# Patient Record
Sex: FEMALE | Race: WHITE | Employment: OTHER | URBAN - METROPOLITAN AREA
[De-identification: names, ages, dates, MRNs, and addresses within clinical notes are randomized per-mention and may not be internally consistent; named-entity substitution may affect disease eponyms.]

---

## 2017-01-14 DIAGNOSIS — I10 HYPERTENSION: ICD-10-CM

## 2017-01-16 RX ORDER — HYDROCHLOROTHIAZIDE 12.5 MG/1
CAPSULE, GELATIN COATED ORAL
Qty: 90 CAPSULE | Refills: 1 | Status: SHIPPED | OUTPATIENT
Start: 2017-01-16 | End: 2017-03-08 | Stop reason: SDUPTHER

## 2017-03-08 ENCOUNTER — OFFICE VISIT (OUTPATIENT)
Dept: FAMILY MEDICINE CLINIC | Age: 57
End: 2017-03-08

## 2017-03-08 VITALS
WEIGHT: 265 LBS | DIASTOLIC BLOOD PRESSURE: 82 MMHG | RESPIRATION RATE: 14 BRPM | TEMPERATURE: 97.8 F | HEART RATE: 74 BPM | SYSTOLIC BLOOD PRESSURE: 114 MMHG | BODY MASS INDEX: 42.59 KG/M2 | HEIGHT: 66 IN

## 2017-03-08 DIAGNOSIS — I10 ESSENTIAL HYPERTENSION: ICD-10-CM

## 2017-03-08 DIAGNOSIS — Z12.31 ENCOUNTER FOR SCREENING MAMMOGRAM FOR BREAST CANCER: ICD-10-CM

## 2017-03-08 DIAGNOSIS — R73.03 PREDIABETES: ICD-10-CM

## 2017-03-08 DIAGNOSIS — Z12.4 CERVICAL CANCER SCREENING: Primary | ICD-10-CM

## 2017-03-08 DIAGNOSIS — E55.9 VITAMIN D DEFICIENCY: ICD-10-CM

## 2017-03-08 DIAGNOSIS — K76.0 FATTY LIVER: ICD-10-CM

## 2017-03-08 DIAGNOSIS — E78.2 MIXED HYPERLIPIDEMIA: ICD-10-CM

## 2017-03-08 DIAGNOSIS — E03.8 OTHER SPECIFIED HYPOTHYROIDISM: ICD-10-CM

## 2017-03-08 DIAGNOSIS — Z12.4 CERVICAL CANCER SCREENING: ICD-10-CM

## 2017-03-08 LAB
ALBUMIN SERPL-MCNC: 4.3 G/DL (ref 3.9–4.9)
ALP BLD-CCNC: 122 U/L (ref 40–130)
ALT SERPL-CCNC: 33 U/L (ref 0–33)
ANION GAP SERPL CALCULATED.3IONS-SCNC: 14 MEQ/L (ref 7–13)
AST SERPL-CCNC: 28 U/L (ref 0–35)
BILIRUB SERPL-MCNC: 0.5 MG/DL (ref 0–1.2)
BUN BLDV-MCNC: 13 MG/DL (ref 6–20)
CALCIUM SERPL-MCNC: 9.9 MG/DL (ref 8.6–10.2)
CHLORIDE BLD-SCNC: 101 MEQ/L (ref 98–107)
CHOLESTEROL, TOTAL: 176 MG/DL (ref 0–199)
CO2: 24 MEQ/L (ref 22–29)
CREAT SERPL-MCNC: 0.53 MG/DL (ref 0.5–0.9)
GFR AFRICAN AMERICAN: >60
GFR NON-AFRICAN AMERICAN: >60
GLOBULIN: 2.5 G/DL (ref 2.3–3.5)
GLUCOSE BLD-MCNC: 100 MG/DL (ref 74–109)
HBA1C MFR BLD: 5.9 % (ref 4.8–5.9)
HCT VFR BLD CALC: 40.6 % (ref 37–47)
HDLC SERPL-MCNC: 51 MG/DL (ref 40–59)
HEMOGLOBIN: 13.4 G/DL (ref 12–16)
LDL CHOLESTEROL CALCULATED: 73 MG/DL (ref 0–129)
MCH RBC QN AUTO: 27.2 PG (ref 27–31.3)
MCHC RBC AUTO-ENTMCNC: 32.9 % (ref 33–37)
MCV RBC AUTO: 82.8 FL (ref 82–100)
PDW BLD-RTO: 14.4 % (ref 11.5–14.5)
PLATELET # BLD: 288 K/UL (ref 130–400)
POTASSIUM SERPL-SCNC: 4.4 MEQ/L (ref 3.5–5.1)
RBC # BLD: 4.91 M/UL (ref 4.2–5.4)
SODIUM BLD-SCNC: 139 MEQ/L (ref 132–144)
T4 FREE: 1.15 NG/DL (ref 0.93–1.7)
TOTAL PROTEIN: 6.8 G/DL (ref 6.4–8.1)
TRIGL SERPL-MCNC: 258 MG/DL (ref 0–200)
TSH SERPL DL<=0.05 MIU/L-ACNC: 2.77 UIU/ML (ref 0.27–4.2)
VITAMIN D 25-HYDROXY: 31.7 NG/ML (ref 30–100)
WBC # BLD: 6.4 K/UL (ref 4.8–10.8)

## 2017-03-08 PROCEDURE — 99214 OFFICE O/P EST MOD 30 MIN: CPT | Performed by: FAMILY MEDICINE

## 2017-03-08 RX ORDER — ATORVASTATIN CALCIUM 20 MG/1
20 TABLET, FILM COATED ORAL DAILY
Qty: 90 TABLET | Refills: 1 | Status: SHIPPED | OUTPATIENT
Start: 2017-03-08 | End: 2017-10-20 | Stop reason: SDUPTHER

## 2017-03-08 RX ORDER — HYDROCHLOROTHIAZIDE 12.5 MG/1
CAPSULE, GELATIN COATED ORAL
Qty: 90 CAPSULE | Refills: 1 | Status: SHIPPED | OUTPATIENT
Start: 2017-03-08 | End: 2017-10-20 | Stop reason: SDUPTHER

## 2017-03-08 ASSESSMENT — PATIENT HEALTH QUESTIONNAIRE - PHQ9
SUM OF ALL RESPONSES TO PHQ9 QUESTIONS 1 & 2: 0
2. FEELING DOWN, DEPRESSED OR HOPELESS: 0
SUM OF ALL RESPONSES TO PHQ QUESTIONS 1-9: 0
1. LITTLE INTEREST OR PLEASURE IN DOING THINGS: 0

## 2017-05-08 ENCOUNTER — HOSPITAL ENCOUNTER (OUTPATIENT)
Dept: WOMENS IMAGING | Age: 57
Discharge: HOME | End: 2017-05-08
Payer: COMMERCIAL

## 2017-05-08 DIAGNOSIS — Z13.9 SCREENING: ICD-10-CM

## 2017-06-09 ENCOUNTER — HOSPITAL ENCOUNTER (OUTPATIENT)
Dept: WOMENS IMAGING | Age: 57
Discharge: HOME | End: 2017-06-09
Payer: COMMERCIAL

## 2017-06-09 PROCEDURE — G0202 SCR MAMMO BI INCL CAD: HCPCS

## 2017-09-08 ENCOUNTER — OFFICE VISIT (OUTPATIENT)
Dept: FAMILY MEDICINE CLINIC | Age: 57
End: 2017-09-08

## 2017-09-08 VITALS
DIASTOLIC BLOOD PRESSURE: 64 MMHG | HEART RATE: 70 BPM | HEIGHT: 66 IN | RESPIRATION RATE: 16 BRPM | BODY MASS INDEX: 40.82 KG/M2 | WEIGHT: 254 LBS | SYSTOLIC BLOOD PRESSURE: 116 MMHG | TEMPERATURE: 98.7 F

## 2017-09-08 DIAGNOSIS — R73.03 PREDIABETES: ICD-10-CM

## 2017-09-08 DIAGNOSIS — E78.2 MIXED HYPERLIPIDEMIA: ICD-10-CM

## 2017-09-08 DIAGNOSIS — R25.1 TREMOR: ICD-10-CM

## 2017-09-08 DIAGNOSIS — M77.12 LATERAL EPICONDYLITIS OF BOTH ELBOWS: ICD-10-CM

## 2017-09-08 DIAGNOSIS — I10 ESSENTIAL HYPERTENSION: Primary | ICD-10-CM

## 2017-09-08 DIAGNOSIS — M77.11 LATERAL EPICONDYLITIS OF BOTH ELBOWS: ICD-10-CM

## 2017-09-08 DIAGNOSIS — Z23 NEED FOR INFLUENZA VACCINATION: ICD-10-CM

## 2017-09-08 LAB
ALBUMIN SERPL-MCNC: 4.3 G/DL (ref 3.9–4.9)
ALP BLD-CCNC: 114 U/L (ref 40–130)
ALT SERPL-CCNC: 29 U/L (ref 0–33)
ANION GAP SERPL CALCULATED.3IONS-SCNC: 13 MEQ/L (ref 7–13)
AST SERPL-CCNC: 27 U/L (ref 0–35)
BILIRUB SERPL-MCNC: 0.5 MG/DL (ref 0–1.2)
BUN BLDV-MCNC: 14 MG/DL (ref 6–20)
CALCIUM SERPL-MCNC: 9.5 MG/DL (ref 8.6–10.2)
CHLORIDE BLD-SCNC: 102 MEQ/L (ref 98–107)
CHOLESTEROL, TOTAL: 153 MG/DL (ref 0–199)
CO2: 26 MEQ/L (ref 22–29)
CREAT SERPL-MCNC: 0.49 MG/DL (ref 0.5–0.9)
GFR AFRICAN AMERICAN: >60
GFR NON-AFRICAN AMERICAN: >60
GLOBULIN: 2.7 G/DL (ref 2.3–3.5)
GLUCOSE BLD-MCNC: 101 MG/DL (ref 74–109)
HBA1C MFR BLD: 5.7 % (ref 4.8–5.9)
HDLC SERPL-MCNC: 46 MG/DL (ref 40–59)
LDL CHOLESTEROL CALCULATED: 59 MG/DL (ref 0–129)
POTASSIUM SERPL-SCNC: 5.4 MEQ/L (ref 3.5–5.1)
SODIUM BLD-SCNC: 141 MEQ/L (ref 132–144)
TOTAL PROTEIN: 7 G/DL (ref 6.4–8.1)
TRIGL SERPL-MCNC: 239 MG/DL (ref 0–200)

## 2017-09-08 PROCEDURE — 90471 IMMUNIZATION ADMIN: CPT | Performed by: FAMILY MEDICINE

## 2017-09-08 PROCEDURE — 90688 IIV4 VACCINE SPLT 0.5 ML IM: CPT | Performed by: FAMILY MEDICINE

## 2017-09-08 PROCEDURE — 99214 OFFICE O/P EST MOD 30 MIN: CPT | Performed by: FAMILY MEDICINE

## 2017-09-08 RX ORDER — DOXYCYCLINE HYCLATE 100 MG
TABLET ORAL
Refills: 3 | COMMUNITY
Start: 2017-07-21 | End: 2018-07-30 | Stop reason: SDUPTHER

## 2017-10-20 DIAGNOSIS — R73.03 PREDIABETES: ICD-10-CM

## 2017-10-20 DIAGNOSIS — I10 ESSENTIAL HYPERTENSION: ICD-10-CM

## 2017-10-20 RX ORDER — ATORVASTATIN CALCIUM 20 MG/1
20 TABLET, FILM COATED ORAL DAILY
Qty: 90 TABLET | Refills: 1 | Status: SHIPPED | OUTPATIENT
Start: 2017-10-20 | End: 2018-03-09 | Stop reason: SDUPTHER

## 2017-10-20 RX ORDER — HYDROCHLOROTHIAZIDE 12.5 MG/1
CAPSULE, GELATIN COATED ORAL
Qty: 90 CAPSULE | Refills: 1 | Status: SHIPPED | OUTPATIENT
Start: 2017-10-20 | End: 2018-03-09 | Stop reason: SDUPTHER

## 2017-10-20 NOTE — TELEPHONE ENCOUNTER
From: Talita Hernandes  Sent: 10/19/2017 7:11 PM EDT  Subject: Medication Renewal Request    Torres Patricia would like a refill of the following medications:  atorvastatin (LIPITOR) 20 MG tablet Liam Harry MD]  hydrochlorothiazide (MICROZIDE) 12.5 MG capsule Liam Harry MD]  metFORMIN (GLUCOPHAGE) 500 MG tablet Liam Harry MD]    Preferred pharmacy: [facility]/PHARMACY #[id number] Giovanna, [address] [phone] - F [phone]    Comment:

## 2018-01-15 ENCOUNTER — OFFICE VISIT (OUTPATIENT)
Dept: FAMILY MEDICINE CLINIC | Age: 58
End: 2018-01-15

## 2018-01-15 VITALS
HEIGHT: 66 IN | DIASTOLIC BLOOD PRESSURE: 84 MMHG | BODY MASS INDEX: 38.25 KG/M2 | SYSTOLIC BLOOD PRESSURE: 138 MMHG | HEART RATE: 95 BPM | TEMPERATURE: 99.4 F | RESPIRATION RATE: 18 BRPM | OXYGEN SATURATION: 95 % | WEIGHT: 238 LBS

## 2018-01-15 DIAGNOSIS — R50.9 FEVER, UNSPECIFIED FEVER CAUSE: ICD-10-CM

## 2018-01-15 DIAGNOSIS — J10.1 INFLUENZA A: Primary | ICD-10-CM

## 2018-01-15 LAB
INFLUENZA A ANTIBODY: POSITIVE
INFLUENZA B ANTIBODY: NEGATIVE

## 2018-01-15 PROCEDURE — 99213 OFFICE O/P EST LOW 20 MIN: CPT | Performed by: NURSE PRACTITIONER

## 2018-01-15 PROCEDURE — 87804 INFLUENZA ASSAY W/OPTIC: CPT | Performed by: NURSE PRACTITIONER

## 2018-01-15 RX ORDER — OSELTAMIVIR PHOSPHATE 75 MG/1
75 CAPSULE ORAL 2 TIMES DAILY
Qty: 10 CAPSULE | Refills: 0 | Status: SHIPPED | OUTPATIENT
Start: 2018-01-15 | End: 2018-01-20

## 2018-01-15 ASSESSMENT — ENCOUNTER SYMPTOMS
HEMOPTYSIS: 0
WHEEZING: 0
HEARTBURN: 0
RHINORRHEA: 0
COUGH: 1
SHORTNESS OF BREATH: 0

## 2018-01-15 NOTE — PROGRESS NOTES
Subjective  Atiya Escoto, 62 y.o. female presents today with:  Chief Complaint   Patient presents with    Cough     x 1 week     Chest Congestion    Fatigue    Generalized Body Aches    Chills       Cough   This is a new problem. The current episode started in the past 7 days. The problem has been unchanged. The problem occurs constantly. The cough is non-productive. Associated symptoms include chills, a fever and myalgias. Pertinent negatives include no ear congestion, ear pain, heartburn, hemoptysis, nasal congestion, postnasal drip, rhinorrhea, shortness of breath, sweats, weight loss or wheezing. Nothing aggravates the symptoms. She has tried nothing for the symptoms. The treatment provided no relief. Review of Systems   Constitutional: Positive for chills, fatigue and fever. Negative for weight loss. HENT: Negative for ear pain, postnasal drip and rhinorrhea. Respiratory: Negative for hemoptysis, shortness of breath and wheezing. Gastrointestinal: Negative for heartburn. Musculoskeletal: Positive for myalgias. Past Medical History:   Diagnosis Date    Cancer ([address]     BASAL CELL    Hayfever     Hyperlipidemia     Hypertension     Obesity     Rosacea     Vitamin D deficiency      Past Surgical History:   Procedure Laterality Date    COLONOSCOPY  2011    negative-Lizet    MOHS SURGERY  2008     Social History     Social History    Marital status:      Spouse name: N/A    Number of children: N/A    Years of education: N/A     Occupational History    Not on file.      Social History Main Topics    Smoking status: Never Smoker    Smokeless tobacco: Never Used    Alcohol use No    Drug use: Unknown    Sexual activity: Not on file     Other Topics Concern    Not on file     Social History Narrative    No narrative on file     Family History   Problem Relation Age of Onset    Cancer Mother      BREAST    High Blood Pressure Mother     Parkinsonism Mother

## 2018-03-09 ENCOUNTER — OFFICE VISIT (OUTPATIENT)
Dept: FAMILY MEDICINE CLINIC | Age: 58
End: 2018-03-09
Payer: COMMERCIAL

## 2018-03-09 VITALS
HEIGHT: 66 IN | BODY MASS INDEX: 38.73 KG/M2 | SYSTOLIC BLOOD PRESSURE: 110 MMHG | TEMPERATURE: 97.2 F | WEIGHT: 241 LBS | DIASTOLIC BLOOD PRESSURE: 80 MMHG | HEART RATE: 66 BPM | OXYGEN SATURATION: 98 % | RESPIRATION RATE: 14 BRPM

## 2018-03-09 DIAGNOSIS — E78.2 MIXED HYPERLIPIDEMIA: ICD-10-CM

## 2018-03-09 DIAGNOSIS — E78.2 MIXED HYPERLIPIDEMIA: Primary | ICD-10-CM

## 2018-03-09 DIAGNOSIS — I10 ESSENTIAL HYPERTENSION: ICD-10-CM

## 2018-03-09 DIAGNOSIS — R73.03 PREDIABETES: ICD-10-CM

## 2018-03-09 DIAGNOSIS — C44.91 BASAL CELL CARCINOMA, UNSPECIFIED SITE: ICD-10-CM

## 2018-03-09 LAB
ALBUMIN SERPL-MCNC: 4.4 G/DL (ref 3.9–4.9)
ALP BLD-CCNC: 129 U/L (ref 40–130)
ALT SERPL-CCNC: 26 U/L (ref 0–33)
ANION GAP SERPL CALCULATED.3IONS-SCNC: 14 MEQ/L (ref 7–13)
AST SERPL-CCNC: 27 U/L (ref 0–35)
BILIRUB SERPL-MCNC: 0.4 MG/DL (ref 0–1.2)
BUN BLDV-MCNC: 16 MG/DL (ref 6–20)
CALCIUM SERPL-MCNC: 9.5 MG/DL (ref 8.6–10.2)
CHLORIDE BLD-SCNC: 102 MEQ/L (ref 98–107)
CHOLESTEROL, TOTAL: 172 MG/DL (ref 0–199)
CO2: 24 MEQ/L (ref 22–29)
CREAT SERPL-MCNC: 0.57 MG/DL (ref 0.5–0.9)
GFR AFRICAN AMERICAN: >60
GFR NON-AFRICAN AMERICAN: >60
GLOBULIN: 2.5 G/DL (ref 2.3–3.5)
GLUCOSE BLD-MCNC: 97 MG/DL (ref 74–109)
HBA1C MFR BLD: 6 % (ref 4.8–5.9)
HCT VFR BLD CALC: 41.6 % (ref 37–47)
HDLC SERPL-MCNC: 45 MG/DL (ref 40–59)
HEMOGLOBIN: 13.5 G/DL (ref 12–16)
LDL CHOLESTEROL CALCULATED: 54 MG/DL (ref 0–129)
MCH RBC QN AUTO: 27.9 PG (ref 27–31.3)
MCHC RBC AUTO-ENTMCNC: 32.5 % (ref 33–37)
MCV RBC AUTO: 85.9 FL (ref 82–100)
PDW BLD-RTO: 14.8 % (ref 11.5–14.5)
PLATELET # BLD: 281 K/UL (ref 130–400)
POTASSIUM SERPL-SCNC: 4.6 MEQ/L (ref 3.5–5.1)
RBC # BLD: 4.84 M/UL (ref 4.2–5.4)
SODIUM BLD-SCNC: 140 MEQ/L (ref 132–144)
TOTAL PROTEIN: 6.9 G/DL (ref 6.4–8.1)
TRIGL SERPL-MCNC: 363 MG/DL (ref 0–200)
WBC # BLD: 6.6 K/UL (ref 4.8–10.8)

## 2018-03-09 PROCEDURE — 99214 OFFICE O/P EST MOD 30 MIN: CPT | Performed by: FAMILY MEDICINE

## 2018-03-09 RX ORDER — ATORVASTATIN CALCIUM 20 MG/1
20 TABLET, FILM COATED ORAL DAILY
Qty: 90 TABLET | Refills: 1 | Status: SHIPPED | OUTPATIENT
Start: 2018-03-09 | End: 2018-10-07 | Stop reason: SDUPTHER

## 2018-03-09 RX ORDER — HYDROCHLOROTHIAZIDE 12.5 MG/1
CAPSULE, GELATIN COATED ORAL
Qty: 90 CAPSULE | Refills: 1 | Status: SHIPPED | OUTPATIENT
Start: 2018-03-09 | End: 2018-10-07 | Stop reason: SDUPTHER

## 2018-03-09 ASSESSMENT — PATIENT HEALTH QUESTIONNAIRE - PHQ9
SUM OF ALL RESPONSES TO PHQ QUESTIONS 1-9: 0
2. FEELING DOWN, DEPRESSED OR HOPELESS: 0
SUM OF ALL RESPONSES TO PHQ9 QUESTIONS 1 & 2: 0
1. LITTLE INTEREST OR PLEASURE IN DOING THINGS: 0

## 2018-07-30 ENCOUNTER — OFFICE VISIT (OUTPATIENT)
Dept: FAMILY MEDICINE CLINIC | Age: 58
End: 2018-07-30
Payer: COMMERCIAL

## 2018-07-30 VITALS
WEIGHT: 240 LBS | RESPIRATION RATE: 16 BRPM | BODY MASS INDEX: 38.57 KG/M2 | HEART RATE: 80 BPM | HEIGHT: 66 IN | TEMPERATURE: 98.3 F | OXYGEN SATURATION: 96 % | DIASTOLIC BLOOD PRESSURE: 60 MMHG | SYSTOLIC BLOOD PRESSURE: 112 MMHG

## 2018-07-30 DIAGNOSIS — Z12.31 ENCOUNTER FOR SCREENING MAMMOGRAM FOR BREAST CANCER: ICD-10-CM

## 2018-07-30 DIAGNOSIS — J40 BRONCHITIS: ICD-10-CM

## 2018-07-30 DIAGNOSIS — I10 ESSENTIAL HYPERTENSION: Primary | ICD-10-CM

## 2018-07-30 DIAGNOSIS — E78.2 MIXED HYPERLIPIDEMIA: ICD-10-CM

## 2018-07-30 PROCEDURE — 93000 ELECTROCARDIOGRAM COMPLETE: CPT | Performed by: FAMILY MEDICINE

## 2018-07-30 PROCEDURE — 99214 OFFICE O/P EST MOD 30 MIN: CPT | Performed by: FAMILY MEDICINE

## 2018-07-30 RX ORDER — AZITHROMYCIN 250 MG/1
TABLET, FILM COATED ORAL
Qty: 1 PACKET | Refills: 0 | Status: SHIPPED | OUTPATIENT
Start: 2018-07-30 | End: 2018-08-03

## 2018-07-30 RX ORDER — ALBUTEROL SULFATE 90 UG/1
2 AEROSOL, METERED RESPIRATORY (INHALATION) EVERY 6 HOURS PRN
Qty: 1 INHALER | Refills: 1 | Status: SHIPPED | OUTPATIENT
Start: 2018-07-30 | End: 2019-07-30

## 2018-07-30 RX ORDER — DOXYCYCLINE HYCLATE 100 MG/1
CAPSULE ORAL
Refills: 3 | COMMUNITY
Start: 2018-07-13

## 2018-07-30 NOTE — PROGRESS NOTES
[address] [facility] FOR cough feels hoarse  Tired x 7d  Feels stuffy in head/nose  NO FEVER OR CHILLS  No STREP EXPOSURE  NO FATIGUE   CAN SWALLOW liquids and solids without difficulty  The patient denies history of       seizures,             heart attack or KNOWN CAD       or stroke. No chest pain, shortness of breath, paroxysmal nocturnal dyspnea. No nausea, vomiting, diarrhea, hematochezia or melena. No paresthesias or headaches      No dysuria, frequency or hematuria. Past Medical History:   Diagnosis Date    Cancer ([address]     BASAL CELL    Hayfever     Hyperlipidemia     Hypertension     Obesity     Rosacea     Vitamin D deficiency      Past Surgical History:   Procedure Laterality Date    COLONOSCOPY  2011    negative-Lizet    MOHS SURGERY  2008     Social History     Social History    Marital status:      Spouse name: N/A    Number of children: N/A    Years of education: N/A     Social History Main Topics    Smoking status: Never Smoker    Smokeless tobacco: Never Used    Alcohol use No    Drug use: Unknown    Sexual activity: Not Asked     Other Topics Concern    None     Social History Narrative    None     No visits with results within 3 Month(s) from this visit. Latest known visit with results is:   Orders Only on 03/09/2018   Component Date Value Ref Range Status    Cholesterol, Total 03/09/2018 172  0 - 199 mg/dL Final    ATP III Cholesterol classification is Desirable.  Triglycerides 03/09/2018 363* 0 - 200 mg/dL Final    ATP III Triglycerides Classification is High.  HDL 03/09/2018 45  40 - 59 mg/dL Final    Comment: ATP III HDL Cholesterol Classification is Desirable. Expected Values:    Males:    >55 = No Risk            35-55 = Moderate Risk            <35 = High Risk    Females:  >65 = No Risk            45-65 = Moderate Risk            <45 = High Risk    NCEP Guidelines:   Third Report May 2001  >59 = negative risk factor for CHD  <40 = - Tdap) 03/09/2019 (Originally 7/4/1979)    Shingles Vaccine (1 of 2 - 2 Dose Series) 03/09/2019 (Originally 7/4/2010)    Flu vaccine (1) 09/01/2018    A1C test (Diabetic or Prediabetic)  03/09/2019    Potassium monitoring  03/09/2019    Creatinine monitoring  03/09/2019    Breast cancer screen  06/09/2019    Cervical cancer screen  03/08/2020    Colon cancer screen colonoscopy  12/28/2021    Lipid screen  03/09/2023         Vitals:    07/30/18 1600   BP: 112/60   Pulse: 80   Resp: 16   Temp: 98.3 °F (36.8 °C)   SpO2: 96%     PHYSICAL EXAMINATION:      -----------------------------------------------------------------------------------------------------  GENERAL:  The patient appears nourished     &  normal affect. No acute distress. Alert and oriented times 3. No obvious skin rashes or lesions. No gait disturbance. HEENT:   Normocephalic, atraumatic. Normal speech    Extraocular eye motions intact and pain free. Pupils reactive and equally round. Conjunctivae clear    TMs normal    MILD erythema posterior pharynx,     NECK:  No masses or adenopathy palpable. No asymmetry visible. No thyromegaly. No bruits. RESPIRATORY:  Equal breath sounds / no acute respiratory distress. No wheezes,rales, or rhonchi        HEART:  Regular rhythm without murmur, rub or gallop. ABDOMEN:  Soft, nontender. No masses, guarding or rebound. Normoactive bowel sounds. EXTREMITIES:   No edema in any extremity. No cyanosis or clubbing. 2+ dorsalis pedis pulses bilaterally    -----------------------------------------------------------------------------------------------------   Diagnosis Orders   1. Essential hypertension     2. Mixed hyperlipidemia     3. Encounter for screening mammogram for breast cancer  BRIANNA DIGITAL SCREEN W CAD BILATERAL   4.  Bronchitis  EKG 12 Lead    XR CHEST STANDARD (2 VW)     Hold doxy   zpak and albuterol  Advised ER IF SOB  Tiredness bothers me a bit  If worse will need er OR IF CP SOB    The patient was reminded that an antibiotic has been prescribed the predicted course is improvement to cure with no persistent issues. Take antibiotics as directed. If any problems occur, an appointment should be made or ER visit if severe. Because of the risk with ANY antibiotic of C. Difficile colitis if persistent diarrhea or abdominal pain or any concerning symptoms, we should be notified. To reduce this risk, a probiotic pill, yogurt or other preparations containing active cultures should be ingested daily -particularly while on the antibiotic. If any persistent symptoms of illness, follow up appointment should be made in a timely fashion with a physician.

## 2018-09-07 ENCOUNTER — OFFICE VISIT (OUTPATIENT)
Dept: FAMILY MEDICINE CLINIC | Age: 58
End: 2018-09-07
Payer: COMMERCIAL

## 2018-09-07 VITALS
SYSTOLIC BLOOD PRESSURE: 118 MMHG | BODY MASS INDEX: 39.34 KG/M2 | DIASTOLIC BLOOD PRESSURE: 82 MMHG | RESPIRATION RATE: 14 BRPM | TEMPERATURE: 96.2 F | HEART RATE: 69 BPM | OXYGEN SATURATION: 98 % | HEIGHT: 66 IN | WEIGHT: 244.8 LBS

## 2018-09-07 DIAGNOSIS — I10 ESSENTIAL HYPERTENSION: ICD-10-CM

## 2018-09-07 DIAGNOSIS — Z12.31 ENCOUNTER FOR SCREENING MAMMOGRAM FOR BREAST CANCER: ICD-10-CM

## 2018-09-07 DIAGNOSIS — L71.9 ROSACEA: ICD-10-CM

## 2018-09-07 DIAGNOSIS — R73.03 PREDIABETES: ICD-10-CM

## 2018-09-07 DIAGNOSIS — J45.909 MILD ASTHMA WITHOUT COMPLICATION, UNSPECIFIED WHETHER PERSISTENT: ICD-10-CM

## 2018-09-07 DIAGNOSIS — E78.2 MIXED HYPERLIPIDEMIA: ICD-10-CM

## 2018-09-07 DIAGNOSIS — R73.03 PREDIABETES: Primary | ICD-10-CM

## 2018-09-07 LAB
ALBUMIN SERPL-MCNC: 4.3 G/DL (ref 3.9–4.9)
ALP BLD-CCNC: 123 U/L (ref 40–130)
ALT SERPL-CCNC: 23 U/L (ref 0–33)
ANION GAP SERPL CALCULATED.3IONS-SCNC: 14 MEQ/L (ref 7–13)
AST SERPL-CCNC: 28 U/L (ref 0–35)
BILIRUB SERPL-MCNC: 0.4 MG/DL (ref 0–1.2)
BUN BLDV-MCNC: 15 MG/DL (ref 6–20)
CALCIUM SERPL-MCNC: 10 MG/DL (ref 8.6–10.2)
CHLORIDE BLD-SCNC: 100 MEQ/L (ref 98–107)
CO2: 25 MEQ/L (ref 22–29)
CREAT SERPL-MCNC: 0.54 MG/DL (ref 0.5–0.9)
GFR AFRICAN AMERICAN: >60
GFR NON-AFRICAN AMERICAN: >60
GLOBULIN: 3.1 G/DL (ref 2.3–3.5)
GLUCOSE BLD-MCNC: 104 MG/DL (ref 74–109)
HBA1C MFR BLD: 5.8 % (ref 4.8–5.9)
POTASSIUM SERPL-SCNC: 4.8 MEQ/L (ref 3.5–5.1)
SODIUM BLD-SCNC: 139 MEQ/L (ref 132–144)
TOTAL PROTEIN: 7.4 G/DL (ref 6.4–8.1)

## 2018-09-07 PROCEDURE — 99214 OFFICE O/P EST MOD 30 MIN: CPT | Performed by: FAMILY MEDICINE

## 2018-09-07 RX ORDER — ADAPALENE 0.1 G/100G
CREAM TOPICAL
Qty: 30 G | Refills: 3 | Status: SHIPPED | OUTPATIENT
Start: 2018-09-07 | End: 2018-10-07

## 2018-09-07 RX ORDER — SULFAMETHOXAZOLE AND TRIMETHOPRIM 800; 160 MG/1; MG/1
1 TABLET ORAL 2 TIMES DAILY
Qty: 14 TABLET | Refills: 0 | Status: SHIPPED | OUTPATIENT
Start: 2018-09-07 | End: 2018-09-14

## 2018-09-07 RX ORDER — BUDESONIDE AND FORMOTEROL FUMARATE DIHYDRATE 80; 4.5 UG/1; UG/1
2 AEROSOL RESPIRATORY (INHALATION) 2 TIMES DAILY
Qty: 1 INHALER | Refills: 3 | Status: SHIPPED | OUTPATIENT
Start: 2018-09-07

## 2018-09-07 NOTE — PATIENT INSTRUCTIONS
prevent problems before they occur. · Your doctor may suggest that you check how well your lungs are working by measuring your peak expiratory flow (PEF) throughout the day. Your PEF may drop when you are near things that trigger symptoms. Where can you learn more? Go to https://chpedread.[employer]. org and sign in to your cookdinner account. Enter Q731 in the [employer] box to learn more about \"Learning About Asthma Triggers. \"     If you do not have an account, please click on the \"Sign Up Now\" link. Current as of: December 6, 2017  Content Version: 11.7  © 1078-6696 [facility]. Care instructions adapted under license by [facility] ([facility]). If you have questions about a medical condition or this instruction, always ask your healthcare professional. [address] any warranty or liability for your use of this information. Patient Education        Asthma in Adults: Care Instructions  Your Care Instructions    During an asthma attack, your airways swell and narrow as a reaction to certain things (triggers). This makes it hard to breathe. You may be able to prevent asthma attacks if you avoid the things that set off your asthma symptoms. Keeping your asthma under control and treating symptoms before they get bad can help you avoid severe attacks. If you can control your asthma, you may be able to do all of your normal daily activities. You may also avoid asthma attacks and trips to the hospital.  Follow-up care is a key part of your treatment and safety. Be sure to make and go to all appointments, and call your doctor if you are having problems. It's also a good idea to know your test results and keep a list of the medicines you take. How can you care for yourself at home? · Follow your asthma action plan so you can manage your symptoms at home.  An asthma action plan will help you prevent and control airway reactions and will tell you what to do during an asthma attack. If you do not have an asthma action plan, work with your doctor to build one. · Take your asthma medicine exactly as prescribed. Medicine plays an important role in controlling asthma. Talk to your doctor right away if you have any questions about what to take and how to take it. ¨ Use your quick-relief medicine when you have symptoms of an attack. Quick-relief medicine often is an albuterol inhaler. Some people need to use quick-relief medicine before they exercise. ¨ Take your controller medicine every day, not just when you have symptoms. Controller medicine is usually an inhaled corticosteroid. The goal is to prevent problems before they occur. Do not use your controller medicine to try to treat an attack that has already started. It does not work fast enough to help. ¨ If your doctor prescribed corticosteroid pills to use during an attack, take them as directed. They may take hours to work, but they may shorten the attack and help you breathe better. ¨ Keep your quick-relief medicine with you at all times. · Talk to your doctor before using other medicines. Some medicines, such as aspirin, can cause asthma attacks in some people. · Check yourself for asthma symptoms to know which step to follow in your action plan. Watch for things like being short of breath, having chest tightness, coughing, and wheezing. Also notice if symptoms wake you up at night or if you get tired quickly when you exercise. · If you have a peak flow meter, use it to check how well you are breathing. This can help you predict when an asthma attack is going to occur. Then you can take medicine to prevent the asthma attack or make it less severe. · See your doctor regularly. These visits will help you learn more about asthma and what you can do to control it. Your doctor will monitor your treatment to make sure the medicine is helping you. · Keep track of your asthma attacks and your treatment.  After you have had an [employer]. Care instructions adapted under license by [facility] ([facility]). If you have questions about a medical condition or this instruction, always ask your healthcare professional. [address] any warranty or liability for your use of this information.

## 2018-10-07 DIAGNOSIS — I10 ESSENTIAL HYPERTENSION: ICD-10-CM

## 2018-10-07 DIAGNOSIS — R73.03 PREDIABETES: ICD-10-CM

## 2018-10-09 RX ORDER — ATORVASTATIN CALCIUM 20 MG/1
20 TABLET, FILM COATED ORAL DAILY
Qty: 90 TABLET | Refills: 3 | Status: SHIPPED | OUTPATIENT
Start: 2018-10-09 | End: 2018-10-12 | Stop reason: SDUPTHER

## 2018-10-09 RX ORDER — HYDROCHLOROTHIAZIDE 12.5 MG/1
CAPSULE, GELATIN COATED ORAL
Qty: 90 CAPSULE | Refills: 3 | Status: SHIPPED | OUTPATIENT
Start: 2018-10-09 | End: 2018-10-12 | Stop reason: SDUPTHER

## 2018-10-12 DIAGNOSIS — I10 ESSENTIAL HYPERTENSION: ICD-10-CM

## 2018-10-12 DIAGNOSIS — R73.03 PREDIABETES: ICD-10-CM

## 2018-10-12 RX ORDER — HYDROCHLOROTHIAZIDE 12.5 MG/1
12.5 CAPSULE, GELATIN COATED ORAL EVERY MORNING
Qty: 90 CAPSULE | Refills: 3 | Status: SHIPPED | OUTPATIENT
Start: 2018-10-12

## 2018-10-12 RX ORDER — ATORVASTATIN CALCIUM 20 MG/1
20 TABLET, FILM COATED ORAL DAILY
Qty: 90 TABLET | Refills: 3 | Status: SHIPPED | OUTPATIENT
Start: 2018-10-12

## 2018-10-15 ENCOUNTER — HOSPITAL ENCOUNTER (OUTPATIENT)
Dept: WOMENS IMAGING | Age: 58
Discharge: HOME | End: 2018-10-17
Payer: COMMERCIAL

## 2018-10-15 DIAGNOSIS — Z12.31 ENCOUNTER FOR SCREENING MAMMOGRAM FOR BREAST CANCER: ICD-10-CM

## 2018-10-15 PROCEDURE — 77063 BREAST TOMOSYNTHESIS BI: CPT

## 2019-03-20 ENCOUNTER — OFFICE VISIT (OUTPATIENT)
Dept: FAMILY MEDICINE CLINIC | Age: 59
End: 2019-03-20
Payer: COMMERCIAL

## 2019-03-20 VITALS
DIASTOLIC BLOOD PRESSURE: 74 MMHG | HEIGHT: 66 IN | OXYGEN SATURATION: 9 % | SYSTOLIC BLOOD PRESSURE: 116 MMHG | RESPIRATION RATE: 16 BRPM | BODY MASS INDEX: 39.51 KG/M2 | TEMPERATURE: 98.1 F | HEART RATE: 80 BPM

## 2019-03-20 DIAGNOSIS — I10 ESSENTIAL HYPERTENSION: ICD-10-CM

## 2019-03-20 DIAGNOSIS — J01.00 ACUTE NON-RECURRENT MAXILLARY SINUSITIS: ICD-10-CM

## 2019-03-20 DIAGNOSIS — E78.2 MIXED HYPERLIPIDEMIA: ICD-10-CM

## 2019-03-20 DIAGNOSIS — R73.03 PREDIABETES: ICD-10-CM

## 2019-03-20 DIAGNOSIS — M25.50 ARTHRALGIA, UNSPECIFIED JOINT: ICD-10-CM

## 2019-03-20 DIAGNOSIS — H65.113 ACUTE MUCOID OTITIS MEDIA OF BOTH EARS: ICD-10-CM

## 2019-03-20 DIAGNOSIS — R73.03 PREDIABETES: Primary | ICD-10-CM

## 2019-03-20 DIAGNOSIS — R68.89 FLU-LIKE SYMPTOMS: ICD-10-CM

## 2019-03-20 LAB
ALBUMIN SERPL-MCNC: 4.3 G/DL (ref 3.5–4.6)
ALP BLD-CCNC: 114 U/L (ref 40–130)
ALT SERPL-CCNC: 25 U/L (ref 0–33)
ANION GAP SERPL CALCULATED.3IONS-SCNC: 13 MEQ/L (ref 9–15)
AST SERPL-CCNC: 29 U/L (ref 0–35)
BILIRUB SERPL-MCNC: 0.4 MG/DL (ref 0.2–0.7)
BUN BLDV-MCNC: 9 MG/DL (ref 6–20)
CALCIUM SERPL-MCNC: 9.5 MG/DL (ref 8.5–9.9)
CHLORIDE BLD-SCNC: 105 MEQ/L (ref 95–107)
CHOLESTEROL, TOTAL: 136 MG/DL (ref 0–199)
CO2: 24 MEQ/L (ref 20–31)
CREAT SERPL-MCNC: 0.51 MG/DL (ref 0.5–0.9)
GFR AFRICAN AMERICAN: >60
GFR NON-AFRICAN AMERICAN: >60
GLOBULIN: 3.1 G/DL (ref 2.3–3.5)
GLUCOSE BLD-MCNC: 100 MG/DL (ref 70–99)
HBA1C MFR BLD: 5.8 % (ref 4.8–5.9)
HCT VFR BLD CALC: 41.5 % (ref 37–47)
HDLC SERPL-MCNC: 37 MG/DL (ref 40–59)
HEMOGLOBIN: 13.7 G/DL (ref 12–16)
INFLUENZA A ANTIBODY: NEGATIVE
INFLUENZA B ANTIBODY: NEGATIVE
LDL CHOLESTEROL CALCULATED: 59 MG/DL (ref 0–129)
MCH RBC QN AUTO: 28.2 PG (ref 27–31.3)
MCHC RBC AUTO-ENTMCNC: 33 % (ref 33–37)
MCV RBC AUTO: 85.4 FL (ref 82–100)
PDW BLD-RTO: 14.5 % (ref 11.5–14.5)
PLATELET # BLD: 297 K/UL (ref 130–400)
POTASSIUM SERPL-SCNC: 4.4 MEQ/L (ref 3.4–4.9)
RBC # BLD: 4.87 M/UL (ref 4.2–5.4)
RHEUMATOID FACTOR: <10 IU/ML (ref 0–14)
SODIUM BLD-SCNC: 142 MEQ/L (ref 135–144)
TOTAL PROTEIN: 7.4 G/DL (ref 6.3–8)
TRIGL SERPL-MCNC: 198 MG/DL (ref 0–150)
WBC # BLD: 5.1 K/UL (ref 4.8–10.8)

## 2019-03-20 PROCEDURE — 87804 INFLUENZA ASSAY W/OPTIC: CPT | Performed by: FAMILY MEDICINE

## 2019-03-20 PROCEDURE — 99214 OFFICE O/P EST MOD 30 MIN: CPT | Performed by: FAMILY MEDICINE

## 2019-03-20 RX ORDER — CEFDINIR 300 MG/1
300 CAPSULE ORAL 2 TIMES DAILY
Qty: 20 CAPSULE | Refills: 0 | Status: SHIPPED | OUTPATIENT
Start: 2019-03-20 | End: 2019-03-30

## 2019-03-23 LAB
ANTINUCLEAR AB INTERPRETIVE COMMENT: NORMAL
ANTINUCLEAR ANTIBODY, HEP-2, IGG: NORMAL

## 2019-12-23 ENCOUNTER — HOSPITAL ENCOUNTER (OUTPATIENT)
Dept: WOMENS IMAGING | Age: 59
Discharge: HOME | End: 2019-12-25
Payer: COMMERCIAL

## 2019-12-23 DIAGNOSIS — Z12.31 ENCOUNTER FOR SCREENING MAMMOGRAM FOR BREAST CANCER: ICD-10-CM

## 2019-12-23 PROCEDURE — 77063 BREAST TOMOSYNTHESIS BI: CPT

## 2021-03-23 ENCOUNTER — HOSPITAL ENCOUNTER (OUTPATIENT)
Dept: WOMENS IMAGING | Age: 61
Discharge: HOME | End: 2021-03-25
Payer: COMMERCIAL

## 2021-03-23 DIAGNOSIS — Z12.31 ENCOUNTER FOR SCREENING MAMMOGRAM FOR MALIGNANT NEOPLASM OF BREAST: ICD-10-CM

## 2021-03-23 PROCEDURE — 77063 BREAST TOMOSYNTHESIS BI: CPT

## 2022-03-15 LAB
AVERAGE GLUCOSE: NORMAL
HBA1C MFR BLD: 5.8 %

## 2022-03-31 ENCOUNTER — HOSPITAL ENCOUNTER (OUTPATIENT)
Dept: WOMENS IMAGING | Age: 62
Discharge: HOME | End: 2022-04-02
Payer: COMMERCIAL

## 2022-03-31 VITALS — WEIGHT: 234 LBS | HEIGHT: 66 IN | BODY MASS INDEX: 37.61 KG/M2

## 2022-03-31 DIAGNOSIS — Z12.31 ENCOUNTER FOR MAMMOGRAM TO ESTABLISH BASELINE MAMMOGRAM: ICD-10-CM

## 2022-03-31 DIAGNOSIS — Z12.31 ENCOUNTER FOR SCREENING MAMMOGRAM FOR BREAST CANCER: ICD-10-CM

## 2022-03-31 PROCEDURE — 77063 BREAST TOMOSYNTHESIS BI: CPT

## 2022-07-11 ENCOUNTER — HOSPITAL ENCOUNTER (OUTPATIENT)
Dept: PHYSICAL THERAPY | Age: 62
Setting detail: THERAPIES SERIES
Discharge: HOME | End: 2022-07-11
Payer: COMMERCIAL

## 2022-07-11 PROCEDURE — 97110 THERAPEUTIC EXERCISES: CPT | Performed by: PHYSICAL THERAPIST

## 2022-07-11 PROCEDURE — 97162 PT EVAL MOD COMPLEX 30 MIN: CPT | Performed by: PHYSICAL THERAPIST

## 2022-07-11 PROCEDURE — 97016 VASOPNEUMATIC DEVICE THERAPY: CPT | Performed by: PHYSICAL THERAPIST

## 2022-07-11 ASSESSMENT — PAIN DESCRIPTION - LOCATION: LOCATION: KNEE

## 2022-07-11 ASSESSMENT — PAIN - FUNCTIONAL ASSESSMENT: PAIN_FUNCTIONAL_ASSESSMENT: PREVENTS OR INTERFERES SOME ACTIVE ACTIVITIES AND ADLS

## 2022-07-11 ASSESSMENT — PAIN DESCRIPTION - ORIENTATION: ORIENTATION: LEFT

## 2022-07-11 ASSESSMENT — PAIN DESCRIPTION - PAIN TYPE: TYPE: SURGICAL PAIN;ACUTE PAIN

## 2022-07-11 ASSESSMENT — PAIN DESCRIPTION - DESCRIPTORS: DESCRIPTORS: DULL

## 2022-07-11 ASSESSMENT — PAIN DESCRIPTION - FREQUENCY: FREQUENCY: CONTINUOUS

## 2022-07-11 ASSESSMENT — PAIN SCALES - GENERAL: PAINLEVEL_OUTOF10: 5

## 2022-07-11 NOTE — PROGRESS NOTES
[facility]  PHYSICAL THERAPY EVALUATION      Physical Therapy: Initial Evaluation    Patient: Angelica Meek (7 y.o.     female)   Examination Date: 9287  Plan of Care Certification Period: 2022 to 22  Progress Note Counter: 1/10   :  1960 ;    Confirmed: Yes MRN: [id number]  CSN: [id number]   Insurance: Payor: Adrienne Jfronal [address] / Plan: [place] Jerrod / Product Type: *No Product type* /   Insurance ID: [id number] - ([facility]) Secondary Insurance (if applicable):    Referring Physician: Ralph Menendez MD     PCP: Maxwell Winter MD Visits to Date/Visits Approved:     No Show/Cancelled Appts:   /       Medical Diagnosis: Presence of left artificial knee joint [Z96.652] Left total knee replacement 2022  Treatment Diagnosis: S/P left total knee replacement with swelling, decreased strength and decreased gait ability     [facility]   Patient Assessed for Rehabilitation Services: Yes  Self reported health status[de-identified] Good    Medical History: Chart Reviewed: Yes   Past Medical History:   Diagnosis Date    Cancer ([address]     BASAL CELL    Hayfever     Hyperlipidemia     Hypertension     Obesity     Rosacea     Vitamin D deficiency      Surgical History:   Past Surgical History:   Procedure Laterality Date    COLONOSCOPY      negative-Lisi  MOHS SURGERY         Medications:   Current Outpatient Medications:     atorvastatin (LIPITOR) 20 MG tablet, Take 1 tablet by mouth daily, Disp: 90 tablet, Rfl: 3    hydrochlorothiazide (MICROZIDE) 12.5 MG capsule, Take 1 capsule by mouth every morning, Disp: 90 capsule, Rfl: 3    metFORMIN (GLUCOPHAGE) 500 MG tablet, Take 1 tablet by mouth nightly, Disp: 90 tablet, Rfl: 3    budesonide-formoterol (SYMBICORT) 80-4.5 MCG/ACT AERO, Inhale 2 puffs into the lungs 2 times daily, Disp: 1 Inhaler, Rfl: 3    doxycycline hyclate (VIBRAMYCIN) 100 MG capsule, TAKE ONE CAPSULE BY MOUTH TWICE DAILY, Disp: , Rfl: 3    albuterol sulfate HFA (PROVENTIL HFA) 108 (90 Base) MCG/ACT inhaler, Inhale 2 puffs into the lungs every 6 hours as needed for Wheezing, Disp: 1 Inhaler, Rfl: 1    SOOLANTRA 1 % CREA, APPLY TO AFFECTED AREAS EVERY DAY, Disp: , Rfl: 5    fluticasone (FLONASE) 50 MCG/ACT nasal spray, 1 spray by Nasal route daily, Disp: 1 Bottle, Rfl: 3    Calcium Carbonate-Vit D-Min 1712-2456 MG-UNIT CHEW, Take 1 tablet by mouth daily. , Disp: 90 tablet, Rfl: 3  Allergies: Patient has no known allergies. SUBJECTIVE EXAMINATION     History obtained from[de-identified] Patient,Chart Review,      Family/Caregiver Present: No    Subjective History: Onset Date: 07/11/22  Subjective: Patient states that the recovery from this total knee surgery has been harder than the right one. She is having more pain, and less range of motion. She states the surgeon told her that the left knee was more complicated than the right one. She is also having difficulty with pain meds and tolerating them.   Additional Pertinent Hx (if applicable): S/P right TKR in 2019, Hearing Loss, Osteoporosis   Previous treatments prior to current episode?: Home PT  Any changes to allergies, medications, or have you had any medical procedures/ER visits since your last visit?: No  Comments: Patient is ambulating with a straight cane      Learning/Language: Learning  Does the patient/guardian have any barriers to learning?: Hearing  Will there be a co-learner?: No  What is the preferred language of the patient/guardian?: English  Is an  required?: No  How does the patient/guardian prefer to learn new concepts?: Demonstration,Pictures/Videos     Pain Screening    Pain Screening  Patient Currently in Pain: Yes  Pain Assessment: 0-10  Pain Level: 5  Best Pain Level: 0  Worst Pain Level: 9  Post Treatment Pain Level: 5  Patient's Stated Pain Goal: 0 - No pain  Pain Type: Surgical pain,Acute pain  Pain Location: Knee  Pain Orientation: Left  Pain Descriptors: Dull  Pain Frequency: Continuous  Functional Pain Assessment: Prevents or interferes some active activities and ADLs  Aggravating factors: Walking  Pain Management/Relieving Factors: Rest,Ice    Functional Status    Social History:    Social History  Lives With: Spouse  Home Layout: One level  Home Access: Stairs to enter with rails  Entrance Stairs - Rails: Left  Entrance Stairs - Number of Steps: 4  Bathroom Shower/Tub: Walk-in shower  Bathroom Equipment: Built-in shower seat  Home Equipment: Cane    Occupation/Interests:   Occupation: Retired  Type of Occupation:   Job Duties: Sedentary    Prior Level of Function: 75%             Current Level of Function: 25%         ADL Assistance: Independent (some assistance needed for shoe and socks)  Homemaking Assistance: Needs assistance  Meal Prep:  (Does not cook)  Laundry: Moderate  Vacuuming: Moderate  Cleaning: Moderate  Ambulation Assistance: Independent  Transfer Assistance: Independent  Active : No  Patient's  Info: Currently not driving    Dominant Hand: : Right    OBJECTIVE EXAMINATION     Restrictions:         Position Activity Restriction  Other position/activity restrictions: None at this time  WB Status: FWB with straight cane on the left, antalgic gait with decreased weight bearing on the left lower extremity    Review of Systems:  Follows Commands: Within Functional Limits  Integumentary Assessment  Edema: Yes  Circumferential Measurements: Mid patella 48.5 cm, superior patella 56.0 cm, interior patella 43.0 cm  Scar: Healing scar incision from surgery    Palpation:   Left Knee Palpation: Swelling of the left knee, and tenderness along the incision line.     Mobility:       Ambulation  WB Status: FWB with straight cane on the left, antalgic gait with decreased weight bearing on the left lower extremity  Ambulation  Surface: level tile  Device: Single point cane  Assistance: Independent  Gait Deviations: Slow Linette,Decreased step length      Left AROM  Right AROM      General AROM LE: Right WNL  AROM LLE (degrees)  L Knee Flexion 0-145: -8 to 70  L Knee Extension 0: -8 General AROM LE: Right WNL      General AROM LE: Left WFL,Right WNL  General AROM LE: Right WNL      Left Strength  Right Strength      General Strength Testing LE: Right WNL  Strength LLE  L Hip Flexion: 4+/5  L Hip Extension: 4+/5  L Hip ABduction: 4+/5  L Hip ADduction: 4+/5  L Hip Internal Rotation: 4+/5  L Hip External Rotation: 4+/5  L Knee Flexion: 4-/5  L Knee Extension: 4-/5 General Strength Testing LE: Right WNL          Outcomes Score:  Exam: Swelling left knee, decreased range of the left hip and knee    Treatment:    Exercises:   Exercises  Exercise 1: Heel slides 10 reps each  Exercise 2: Quad sets 10 reps  Exercise 3: Straight leg raise 10 reps  Exercise 4: Bent knee fall out 10 reps  Treatment Reasoning  Limitations addressed: Flexibility,Mobility,Pain modulation  Therapist provided: Assistance  Functional ability(s) targeted: Ambulating community distances,Performing self care actvities,Tolerance to age appropriate activities  Modalities:     Vasopneumatic Device (CPT 75979)  Patient Position: Supine  Vasopneumatic Specified Location: left knee and lower extremity  Pre-Girth Measurement: 48.5 cm mid patella  Post-Girth Measurement: 48.0 cm mid patella  Post treatment skin assessment: Intact  Limitations addressed: Pain modulation,Edema  Functional ability(s) targeted: Tolerance to age appropriate activities     *Indicates exercise,modality, or manual techniques to be initiated when appropriate       ASSESSMENT     Impression: Assessment: Problem List:  1. Decreased active range of motion of the left knee 2. Decreased strength of the left knee and hip  3. Decreased gait ability with cane  4. Pain of the left knee especially with weight bearing 5. Swelling of the left knee 6. Low score on the LEFS 7.   Difficulty with ADL skills    Body Structures, Functions, Activity Limitations Requiring Skilled Therapeutic Intervention: Decreased functional mobility ,Decreased ADL status,Decreased ROM,Decreased strength,Increased pain    Statement of Medical Necessity: Physical Therapy is both indicated and medically necessary as outlined in the POC to increase the likelihood of meeting the functionally related goals stated below. Patient's Activity Tolerance: Patient tolerated evaluation without incident      Patient's rehabilitation potential/prognosis is considered to be: Excellent    Factors which may impact rehabilitation potential include: Pain tolerance/management     Patient Education: PT Education: Tanja Wiggins of Care,Evaluative findings,Home Exercise Program     GOALS   Patient Goal(s): Patient goals : Full range of motion of the left knee    Short Term Goals Completed by 3-5 treatments Goal Status   STG 1Increase active range of motion of the left knee to 90 degrees New   STG 2 Decrease swelling of the left knee by 1.0cm each measurement New   STG 3 Pain of the left knee decreased to 3/10 when quiet, 4/10 when walking New   STG 4       STG 5           Long Term Goals Completed by 5-10 treatments Goal Status   LTG 1 Active range of motion of the left knee 0-110 degrees New   LTG 2 Patient will report she is able to put on shoes and socks with less pain and independently New   LTG 3 Left knee strength will be 4+/5 to 5/5 New   LTG 4 Left hip strength will be 4+/5 to 5/5 New   LTG 5 Patient will demo good reciprical gait pattern with a straight cane with no antalgic pattern New   LTG 6 Long term goal 6: Pain with walking will be reported to be 2/10 or less, 1/10 or less sitting quietly  Long term goal 7: LEFS will be scored greater than 19/80 at discharge           TREATMENT PLAN   PT Equipment Recommendations  Equipment Needed: No   Requires PT Follow-Up: Yes  Treatment Initiated :  Active range of motion of the left hip and knee, vasopnumatic device  Specific Instructions for Next Treatment: Continue with total knee exercise, vaso pnumatic device for pain and swelling    Treatment may include any combination of the following: Strengthening,ROM,Functional mobility training,ADL/Self-care training,Manual Therapy - Joint Manipulation,Manual Therapy - Soft Tissue Mobilization,Gait training,Pain management,Home exercise program     Frequency / Duration:  Patient to be seen 2 times weekly times per week for 5 weeks weeks  Plan Comment:       Current home exercise program (HEP): Heel slides, QS, SLR       Eval Complexity:   History: Personal Factors and/or Comorbidities Impacting POC: Medium  History: right TKR, chronic pain  Examination of body system(s) including body structures and functions, activity limitations, and/or participation restrictions: Medium  Exam: Swelling left knee, decreased range of the left hip and knee  Clinical Presentation: Low    POST-PAIN     Pain Rating (0-10 pain scale):  5/10  Location and pain description same as pre-treatment unless indicated. Action: [] NA  [] Call Physician  [x] Perform HEP  [] Meds as prescribed    Evaluation and patient rights have been reviewed and patient agrees with plan of care. Yes  [x]  No  []   Explain:     Masha Fall Risk Assessment  Risk Factor Scale  Score   History of Falls [] Yes  [x] No 25  0 0   Secondary Diagnosis [] Yes  [x] No 15  0 0   Ambulatory Aid [] Furniture  [] Crutches/cane/walker  [x] None/bedrest/wheelchair/nurse 30  15  0 0   IV/Heparin Lock [] Yes  [x] No 20  0 0   Gait/Transferring [] Impaired  [x] Weak  [] Normal/bedrest/immobile 20  10  0 10   Mental Status [] Forgets limitations  [x] Oriented to own ability 15  0 0      Total:   0     Based on the Assessment score: check the appropriate box.   [x]  No intervention needed   Low =   Score of 0-24  []  Use standard prevention interventions Moderate =  Score of 24-44   [] Discuss fall prevention strategies   []

## 2022-07-14 NOTE — PROGRESS NOTES
4+/5 New   Long term goal 5: Patient will demo good reciprical gait pattern with a straight cane with no antalgic pattern Antalgic gait pattern due to decreased range of motion and decreased strength New   Long term goal 6: Pain with walking will be reported to be 2/10 or less, 1/10 or less sitting quietly See above     Long term goal 7: LEFS will be scored greater than 19/80 at discharge Current score 19/80 indicating moderate disability         Body Structures, Functions, Activity Limitations Requiring Skilled Therapeutic Intervention: Decreased functional mobility ,Decreased ADL status,Decreased ROM,Decreased strength,Increased pain  Assessment: Problem List:  1. Decreased active range of motion of the left knee 2. Decreased strength of the left knee and hip  3. Decreased gait ability with cane  4. Pain of the left knee especially with weight bearing 5. Swelling of the left knee 6. Low score on the LEFS 7. Difficulty with ADL skills  Specific Instructions for Next Treatment: Continue with total knee exercise, vaso pnumatic device for pain and swelling  Therapy Prognosis: Excellent           PLAN: [x] Evaluate and Treat  Frequency/Duration:  Plan Frequency: 2 times weekly  Plan weeks: 5 weeks  Specific Instructions for Next Treatment: Continue with total knee exercise, vaso pnumatic device for pain and swelling  Current Treatment Recommendations: Strengthening,ROM,Functional mobility training,ADL/Self-care training,Manual Therapy - Joint Manipulation,Manual Therapy - Soft Tissue Mobilization,Gait training,Pain management,Home exercise program                               Patient Status:[x] Continue/ Initiate plan of Care    [] Discharge PT. Recommend pt continue with HEP.      [] Additional visits requested, Please re-certify for additional visits:    [] Hold         Signature: Electronically signed by Karen May PT on 7/14/22 at 11:34 AM EDT      If you have any questions or concerns, please don't hesitate to call. Thank you for your referral.    I have reviewed this plan of care and certify a need for medically necessary rehabilitation services.     Physician Signature:__________________________________________________________  Date:  Please sign and return

## 2022-07-15 ENCOUNTER — HOSPITAL ENCOUNTER (OUTPATIENT)
Dept: PHYSICAL THERAPY | Age: 62
Setting detail: THERAPIES SERIES
Discharge: HOME | End: 2022-07-15
Payer: COMMERCIAL

## 2022-07-15 PROCEDURE — 97110 THERAPEUTIC EXERCISES: CPT | Performed by: PHYSICAL THERAPIST

## 2022-07-15 ASSESSMENT — PAIN DESCRIPTION - ORIENTATION: ORIENTATION: LEFT

## 2022-07-15 ASSESSMENT — PAIN DESCRIPTION - PAIN TYPE: TYPE: SURGICAL PAIN

## 2022-07-15 ASSESSMENT — PAIN DESCRIPTION - DESCRIPTORS: DESCRIPTORS: DULL

## 2022-07-15 ASSESSMENT — PAIN SCALES - GENERAL: PAINLEVEL_OUTOF10: 5

## 2022-07-15 ASSESSMENT — PAIN DESCRIPTION - LOCATION: LOCATION: KNEE

## 2022-07-15 ASSESSMENT — PAIN DESCRIPTION - FREQUENCY: FREQUENCY: CONTINUOUS

## 2022-07-15 NOTE — PROGRESS NOTES
[address] [facility]  Outpatient Physical Therapy    Treatment Note        Date: 7/15/2022  Patient: Louann Chung  : 1960   Confirmed: Yes  MRN: [id number]  Referring Provider: Joselyn Schmidt MD   Secondary Referring Provider (If applicable):     Medical Diagnosis: Presence of left artificial knee joint [Z96.652] Left total knee replacement 2022  Treatment Diagnosis: S/P left total knee replacement with swelling, decreased strength and decreased gait ability    Visit Information:  Insurance: Payor: [facility] / Plan: Liliane Nest / Product Type: *No Product type* /   PT Visit Information  Onset Date: 22  PT Insurance Information: [facility]  Total # of Visits Approved: 75  Total # of Visits to Date: 2  Plan of Care/Certification Expiration Date: 22  No Show: 0  Progress Note Due Date: 22  Canceled Appointment: 0  Progress Note Counter: 2/10    Subjective Information:  Subjective: Patient states that she is still having trouble controlling her pain,  pain pills are making her sick, and especially at night.   HEP Compliance:  [x] Good [] Fair [] Poor [] Reports not doing due to:    Pain Screening  Patient Currently in Pain: Yes  Pain Assessment: 0-10  Pain Level: 5  Best Pain Level: 0  Worst Pain Level: 9  Post Treatment Pain Level: 5  Patient's Stated Pain Goal: 0 - No pain  Pain Type: Surgical pain  Pain Location: Knee  Pain Orientation: Left  Pain Descriptors: Dull  Pain Frequency: Continuous  Aggravating factors: Walking  Pain Management/Relieving Factors: Rest, Ice    Treatment:  Exercises:  Exercises  Exercise 1: Heel slides 10 reps each  Exercise 2: Quad sets 10 reps  Exercise 3: Straight leg raise 10 reps  Exercise 4: Bent knee fall out 10 reps  Exercise 5: Supine abduction 10 reps  Exercise 6: Upright bike rocking back and forth 10 minutes  Exercise 7: Sitting knee extension and flexion 10 reps each  Treatment Reasoning  Limitations addressed: Flexibility, Mobility, Pain modulation  Therapist provided: Assistance  Functional ability(s) targeted: Ambulating community distances, Performing self care actvities, Tolerance to age appropriate activities          Modalities:  Moist Heat (CPT 91314)  Patient Position: Supine  Number Minutes Moist Heat: 10  Moist heat location: Left  Moist heat specified location: left knee  Limitations addressed: Pain modulation, Tissue extensibility  Vasopneumatic Device (CPT 61521)  Patient Position: Supine  Vasopneumatic Specified Location: left knee and lower extremity  Pre-Girth Measurement: 47 cm  Post-Girth Measurement: 48.0 cm mid patella  Post treatment skin assessment: Intact  Limitations addressed: Pain modulation, Edema  Functional ability(s) targeted: Tolerance to age appropriate activities       *Indicates exercise, modality, or manual techniques to be initiated when appropriate    Objective Measures:      Strength: [x] NT  [] MMT completed:        ROM: [] NT  [x] ROM measurements:     AROM LLE (degrees)  L Knee Flexion 0-145: -3 to 75  L Knee Extension 0: -5          Assessment: Body Structures, Functions, Activity Limitations Requiring Skilled Therapeutic Intervention: Decreased functional mobility , Decreased ADL status, Decreased ROM, Decreased strength, Increased pain  Assessment: Patient was able to increase her knee flexion, and decrease her extensor lag. She still has moderate pain of the left knee.   She needs continued work on knee range of motion, and decreasing her swelling  Treatment Diagnosis: S/P left total knee replacement with swelling, decreased strength and decreased gait ability  Therapy Prognosis: Excellent  Activity Tolerance  Activity Tolerance: Patient tolerated treatment well  Activity Tolerance Comments: Patient able to tolerate an increase in her exercises    Patient Education: [] NA  PT Education: Home Exercise Program    Post-Pain Assessment:       Pain Rating (0-10 pain scale):   5/10   Location and pain description same as pre-treatment unless indicated. Action: [] NA   [x] Perform HEP  [] Meds as prescribed  [] Modalities as prescribed   [] Call Physician     GOALS   Patient Goal(s): Patient goals : Full range of motion of the left knee    Short Term Goals Completed by 3-5 treatments Goal Status   STG 1 Increase active range of motion of the left knee to 90 degrees In progress   STG 2 Decrease swelling of the left knee by 1.0cm each measurement Met   STG 3 Pain of the left knee decreased to 3/10 when quiet, 4/10 when walking In progress   STG 4       STG 5           Long Term Goals Completed by 5-10 treatments Goal Status   LTG 1 Active range of motion of the left knee 0-110 degrees In progress   LTG 2 Patient will report she is able to put on shoes and socks with less pain and independently In progress   LTG 3 Left knee strength will be 4+/5 to 5/5 In progress   LTG 4 Left hip strength will be 4+/5 to 5/5 In progress   LTG 5 Patient will demo good reciprical gait pattern with a straight cane with no antalgic pattern In progress   LTG 6 Long term goal 6: Pain with walking will be reported to be 2/10 or less, 1/10 or less sitting quietly  Long term goal 7: LEFS will be scored greater than 19/80 at discharge LTG Goal 6 Status[de-identified] In progress            Plan:  Frequency/Duration:  Plan  Plan Frequency: 2 times weekly  Plan weeks: 5 weeks  Specific Instructions for Next Treatment: Continue with total knee exercise, vaso pnumatic device for pain and swelling  Current Treatment Recommendations: Strengthening, ROM, Functional mobility training, ADL/Self-care training, Manual Therapy - Joint Manipulation, Manual Therapy - Soft Tissue Mobilization, Gait training, Pain management, Home exercise program  Pt to continue current HEP. See objective section for any therapeutic exercise changes, additions or modifications this date.     Therapy Time:      PT Individual Minutes  Time In: 0900  Time Out: 1000  Minutes: 60  Timed Code Treatment Minutes: 35 Minutes  Procedure Minutes: 15 minutes vasopnuematic device, 10 minutes moist heat    Timed Activity Minutes Units code In/out   Ther Ex 35 2 [id number]   Manual          Electronically signed by Jojo Rivas, PT on 7/15/22 at 3:43 PM EDT

## 2022-07-18 ENCOUNTER — HOSPITAL ENCOUNTER (OUTPATIENT)
Dept: PHYSICAL THERAPY | Age: 62
Setting detail: THERAPIES SERIES
Discharge: HOME | End: 2022-07-18
Payer: COMMERCIAL

## 2022-07-18 PROCEDURE — 97110 THERAPEUTIC EXERCISES: CPT | Performed by: PHYSICAL THERAPIST

## 2022-07-18 PROCEDURE — 97124 MASSAGE THERAPY: CPT | Performed by: PHYSICAL THERAPIST

## 2022-07-18 ASSESSMENT — PAIN DESCRIPTION - ORIENTATION: ORIENTATION: LEFT

## 2022-07-18 ASSESSMENT — PAIN DESCRIPTION - DESCRIPTORS: DESCRIPTORS: DULL;ACHING

## 2022-07-18 ASSESSMENT — PAIN DESCRIPTION - PAIN TYPE: TYPE: SURGICAL PAIN

## 2022-07-18 ASSESSMENT — PAIN - FUNCTIONAL ASSESSMENT: PAIN_FUNCTIONAL_ASSESSMENT: PREVENTS OR INTERFERES SOME ACTIVE ACTIVITIES AND ADLS

## 2022-07-18 ASSESSMENT — PAIN DESCRIPTION - FREQUENCY: FREQUENCY: CONTINUOUS

## 2022-07-18 ASSESSMENT — PAIN SCALES - GENERAL: PAINLEVEL_OUTOF10: 6

## 2022-07-18 ASSESSMENT — PAIN DESCRIPTION - LOCATION: LOCATION: KNEE

## 2022-07-18 NOTE — PROGRESS NOTES
[address] [facility] [place]  Outpatient Physical Therapy    Treatment Note        Date: 2022  Patient: Merlin Valenzuela  : 1960   Confirmed: Yes  MRN: [id number]  Referring Provider: Onofre Canales MD   Secondary Referring Provider (If applicable):     Medical Diagnosis: Presence of left artificial knee joint [Z96.652] Left total knee replacement 2022  Treatment Diagnosis: S/P left total knee replacement with swelling, decreased strength and decreased gait ability    Visit Information:  Insurance: Payor: [employer] / Plan: Elvan Grumbling / Product Type: *No Product type* /   PT Visit Information  Onset Date: 22  PT Insurance Information: [facility]  Total # of Visits Approved: 75  Total # of Visits to Date: 3  Plan of Care/Certification Expiration Date: 22  No Show: 0  Progress Note Due Date: 22  Canceled Appointment: 0  Progress Note Counter: 3/10    Subjective Information:  Subjective: Patient states that she was able to get some sleep over the weekend and it did seem to help with her knee, however today she states that her knee is painful and stiff.   HEP Compliance:  [x] Good [] Fair [] Poor [] Reports not doing due to:    Pain Screening  Patient Currently in Pain: Yes  Pain Assessment: 0-10  Pain Level: 6  Best Pain Level: 0  Worst Pain Level: 9  Post Treatment Pain Level: 5  Patient's Stated Pain Goal: 0 - No pain  Pain Type: Surgical pain  Pain Location: Knee  Pain Orientation: Left  Pain Descriptors: Dull, Aching  Pain Frequency: Continuous  Functional Pain Assessment: Prevents or interferes some active activities and ADLs    Treatment:  Exercises:  Exercises  Exercise 1: Heel slides 20 reps each  Exercise 2: Quad sets 10 reps  Exercise 3: Straight leg raise 10 reps  Exercise 4: Bent knee fall out 10 reps with yellow theraband  Exercise 5: Sidelying abduction 20 reps  Exercise 6: Upright bike rocking back and forth 10 minutes  Exercise 7: Sitting knee extension and flexion 10 reps each  Exercise 8: Sitting hamstring curls with yellow theraband 10 reps  Exercise 9: Standing hip extension and abduction 10 reps each leg  Exercise 10: 2 inch step ups forward and lateral, 20 reps, one set with left leading and one set with right leading  Treatment Reasoning  Limitations addressed: Flexibility, Mobility, Pain modulation  Therapist provided: Assistance  Functional ability(s) targeted: Ambulating community distances, Performing self care actvities, Tolerance to age appropriate activities      Manual:   Manual Therapy  Soft Tissue Mobilizaton: Soft tissue massage to distal quad 10 minutes  Treatment Reasoning  Limitations addressed: Tissue extensibility, Painful spasm  Therapist provided: Assistance  Functional ability(s) targeted: Ambulating community distances, Tolerance to age appropriate activities    Modalities:  Moist Heat (CPT 30403)  Patient Position: Supine  Number Minutes Moist Heat: 10  Moist heat location: Left  Moist heat specified location: left knee  Post treatment skin assessment: Intact  Limitations addressed: Pain modulation, Tissue extensibility       *Indicates exercise, modality, or manual techniques to be initiated when appropriate    Objective Measures:      Strength: [x] NT  [] MMT completed:     ROM: [] NT  [x] ROM measurements:  General AROM LE: Right WNL  AROM LLE (degrees)  L Knee Flexion 0-145: 0-90  L Knee Extension 0: 0     Assessment: Body Structures, Functions, Activity Limitations Requiring Skilled Therapeutic Intervention: Decreased functional mobility , Decreased ADL status, Decreased ROM, Decreased strength, Increased pain  Assessment: Patient has made progress with her program in her knee range of motion and her exercise tolerance. Her reported pain level is still in the moderate range.   Treatment Diagnosis: S/P left total knee replacement with swelling, decreased strength and decreased gait ability  Therapy Prognosis: Excellent  Activity Tolerance  Activity Tolerance Comments: Patient was able to tolerate weight bearing exercise today without and increase in pain    Patient Education: [] NA  PT Education: Home Exercise Program    Post-Pain Assessment:       Pain Rating (0-10 pain scale):   5/10   Location and pain description same as pre-treatment unless indicated.    Action: [] NA   [x] Perform HEP  [x] Meds as prescribed  [] Modalities as prescribed   [] Call Physician     GOALS   Patient Goal(s): Patient goals : Full range of motion of the left knee    Short Term Goals Completed by 3-5 treatments Goal Status   STG 1 Increase active range of motion of the left knee to 90 degrees Met   STG 2 Decrease swelling of the left knee by 1.0cm each measurement Met   STG 3 Pain of the left knee decreased to 3/10 when quiet, 4/10 when walking In progress   STG 4       STG 5           Long Term Goals Completed by 5-10 treatments Goal Status   LTG 1 Active range of motion of the left knee 0-110 degrees In progress   LTG 2 Patient will report she is able to put on shoes and socks with less pain and independently In progress   LTG 3 Left knee strength will be 4+/5 to 5/5 In progress   LTG 4 Left hip strength will be 4+/5 to 5/5 In progress   LTG 5 Patient will demo good reciprical gait pattern with a straight cane with no antalgic pattern In progress   LTG 6 Long term goal 6: Pain with walking will be reported to be 2/10 or less, 1/10 or less sitting quietly  Long term goal 7: LEFS will be scored greater than 19/80 at discharge LTG Goal 6 Status[de-identified] In progress            Plan:  Frequency/Duration:  Plan  Plan Frequency: 2 times weekly  Plan weeks: 5 weeks  Specific Instructions for Next Treatment: Continue with total knee exercise, vaso pnumatic device for pain and swelling  Current Treatment Recommendations: Strengthening, ROM, Functional mobility training, ADL/Self-care training, Manual Therapy - Joint Manipulation, Manual Therapy - Soft Tissue Mobilization, Gait training, Pain management, Home exercise program  Plan Comment: Begin light weights next session, add prone exercises next session  Pt to continue current HEP. See objective section for any therapeutic exercise changes, additions or modifications this date.     Therapy Time:      PT Individual Minutes  Time In: 1100  Time Out: 1200  Minutes: 60  Timed Code Treatment Minutes: 50 Minutes  Procedure Minutes: 10 minutes moist heat    Timed Activity Minutes Units code In/out   Ther Ex 40 3 [id number] [id number]   Massage 10 1 [id number] [id number]     Electronically signed by Anil Santillan PT on 7/18/22 at 1:24 PM EDT

## 2022-07-21 ENCOUNTER — HOSPITAL ENCOUNTER (OUTPATIENT)
Dept: PHYSICAL THERAPY | Age: 62
Setting detail: THERAPIES SERIES
Discharge: HOME | End: 2022-07-21
Payer: COMMERCIAL

## 2022-07-21 PROCEDURE — 97110 THERAPEUTIC EXERCISES: CPT

## 2022-07-21 ASSESSMENT — PAIN DESCRIPTION - DESCRIPTORS: DESCRIPTORS: ACHING

## 2022-07-21 ASSESSMENT — PAIN DESCRIPTION - LOCATION: LOCATION: KNEE

## 2022-07-21 ASSESSMENT — PAIN SCALES - GENERAL: PAINLEVEL_OUTOF10: 4

## 2022-07-21 ASSESSMENT — PAIN DESCRIPTION - ORIENTATION: ORIENTATION: LEFT

## 2022-07-21 NOTE — PROGRESS NOTES
[facility]  Outpatient Physical Therapy    Treatment Note        Date: 2022  Patient: Chaya Whipple  : 1960   Confirmed: yes  MRN: [id number]  Referring Provider: Delia Singh MD   Secondary Referring Provider (If applicable):     Medical Diagnosis: Presence of left artificial knee joint [Z96.652] Left total knee replacement 2022  Treatment Diagnosis: S/P left total knee replacement with swelling, decreased strength and decreased gait ability    Visit Information:  Insurance: Payor: Kenny Alexander / Plan: [employer] / Product Type: *No Product type* /   PT Visit Information  Onset Date: 22  PT Insurance Information: [facility]  Total # of Visits Approved: 75  Total # of Visits to Date: 4  Plan of Care/Certification Expiration Date: 22  No Show: 0  Progress Note Due Date: 22  Canceled Appointment: 0  Progress Note Counter: 4/10    Subjective Information:  Subjective: Pt reports went to gym and was able to jeffy UE exercises.   HEP Compliance:  [x] Good [] Fair [] Poor [] Reports not doing due to:    Pain Screening  Pain Assessment: 0-10  Pain Level: 4  Pain Location: Knee  Pain Orientation: Left  Pain Descriptors: Aching    Treatment:  Exercises:  Exercises  Exercise 2: Quad sets 10 reps  Exercise 3: Straight leg raise x 18  Exercise 5: Sidelying abduction 20 reps with breaks  Exercise 6: Upright bike rocking back and forth 10 minutes (retro x 10 full revolution)  Exercise 7: Sitting knee extension and flexion 10 reps each  Exercise 12: PKF x 20  Exercise 13: prone hip extension  Exercise 14: prone quad str with strap 30s/3  Exercise 15: PROM left knee  Exercise 20: HEP: cont current as jeffy       Modalities:      CP left knee x 10 minutes    *Indicates exercise, modality, or manual techniques to be initiated when appropriate    Objective Measures:     Ambulation  Surface: level tile  Device: Single point cane  Assistance: Modified Independent  Gait Deviations: Slow Linette, Decreased step length       ROM: [] NT  [] ROM measurements:     AROM LLE (degrees)  L Knee Flexion 0-145: 0-98  L Knee Extension 0: 0         Assessment: Body Structures, Functions, Activity Limitations Requiring Skilled Therapeutic Intervention: Decreased functional mobility , Decreased ADL status, Decreased ROM, Decreased strength, Increased pain  Assessment: Pt with improving ROM, however, still having difficulty with full revolution on bike. Instructed on cont'd stretching to tolerance for cont'd improvements. Treatment Diagnosis: S/P left total knee replacement with swelling, decreased strength and decreased gait ability             Post-Pain Assessment:       Pain Rating (0-10 pain scale):  3 /10   Location and pain description same as pre-treatment unless indicated.    Action: [] NA   [x] Perform HEP  [] Meds as prescribed  [] Modalities as prescribed   [] Call Physician     GOALS   Patient Goal(s): Patient goals : Full range of motion of the left knee    Short Term Goals Completed by 3-5 treatments Goal Status   STG 1 Increase active range of motion of the left knee to 90 degrees Met   STG 2 Decrease swelling of the left knee by 1.0cm each measurement Met   STG 3 Pain of the left knee decreased to 3/10 when quiet, 4/10 when walking In progress       Long Term Goals Completed by 5-10 treatments Goal Status   LTG 1 Active range of motion of the left knee 0-110 degrees In progress   LTG 2 Patient will report she is able to put on shoes and socks with less pain and independently In progress   LTG 3 Left knee strength will be 4+/5 to 5/5 In progress   LTG 4 Left hip strength will be 4+/5 to 5/5 In progress   LTG 5 Patient will demo good reciprical gait pattern with a straight cane with no antalgic pattern In progress   LTG 6 Long term goal 6: Pain with walking will be reported to be 2/10 or less, 1/10 or less sitting quietly  Long term goal 7: LEFS will be scored greater than 19/80 at discharge Plan:  Frequency/Duration:  Plan  Plan Frequency: 2 times weekly  Plan weeks: 5 weeks  Current Treatment Recommendations: Strengthening, ROM, Functional mobility training, ADL/Self-care training, Manual Therapy - Joint Manipulation, Manual Therapy - Soft Tissue Mobilization, Gait training, Pain management, Home exercise program  Pt to continue current HEP. See objective section for any therapeutic exercise changes, additions or modifications this date.     Therapy Time:      PT Individual Minutes  Time In: 1100  Time Out: 1150  Minutes: 50  Timed Code Treatment Minutes: 40 Minutes  Procedure Minutes:10  Timed Activity Minutes Units   Ther Ex 40 3     Electronically signed by Jules Sparks PTA on 7/21/22 at 10:56 AM EDT

## 2022-07-25 ENCOUNTER — HOSPITAL ENCOUNTER (OUTPATIENT)
Dept: PHYSICAL THERAPY | Age: 62
Setting detail: THERAPIES SERIES
Discharge: HOME | End: 2022-07-25
Payer: COMMERCIAL

## 2022-07-25 PROCEDURE — 97110 THERAPEUTIC EXERCISES: CPT | Performed by: PHYSICAL THERAPIST

## 2022-07-25 ASSESSMENT — PAIN DESCRIPTION - ORIENTATION: ORIENTATION: LEFT

## 2022-07-25 ASSESSMENT — PAIN DESCRIPTION - DESCRIPTORS: DESCRIPTORS: ACHING

## 2022-07-25 ASSESSMENT — PAIN DESCRIPTION - LOCATION: LOCATION: KNEE

## 2022-07-25 ASSESSMENT — PAIN DESCRIPTION - PAIN TYPE: TYPE: SURGICAL PAIN

## 2022-07-25 ASSESSMENT — PAIN - FUNCTIONAL ASSESSMENT: PAIN_FUNCTIONAL_ASSESSMENT: PREVENTS OR INTERFERES SOME ACTIVE ACTIVITIES AND ADLS

## 2022-07-25 ASSESSMENT — PAIN SCALES - GENERAL: PAINLEVEL_OUTOF10: 4

## 2022-07-25 NOTE — PROGRESS NOTES
[address] [facility]  Outpatient Physical Therapy    Treatment Note        Date: 2022  Patient: Avani Parikh  : 1960   Confirmed: Yes  MRN: [id number]  Referring Provider: Roger Aguilar MD   Secondary Referring Provider (If applicable):     Medical Diagnosis: Presence of left artificial knee joint [Z96.652] Left total knee replacement 2022  Treatment Diagnosis: S/P left total knee replacement with swelling, decreased strength and decreased gait ability    Visit Information:  Insurance: Payor: [employer] / Plan: Courtney Lange / Product Type: *No Product type* /   PT Visit Information  Onset Date: 22  PT Insurance Information: [facility]  Total # of Visits Approved: 75  Total # of Visits to Date: 5  Plan of Care/Certification Expiration Date: 22  No Show: 0  Progress Note Due Date: 22  Canceled Appointment: 0  Progress Note Counter: 5/10    Subjective Information:  Subjective: Patient reports that her pain of her left knee is down, she is able to sleep better.   Comments: Patient is ambulating without an assistive device  HEP Compliance:  [x] Good [] Fair [] Poor [] Reports not doing due to:    Pain Screening  Patient Currently in Pain: Yes  Pain Assessment: 0-10  Pain Level: 4  Best Pain Level: 0  Worst Pain Level: 9  Post Treatment Pain Level: 4  Pain Type: Surgical pain  Pain Location: Knee  Pain Orientation: Left  Pain Descriptors: Aching  Functional Pain Assessment: Prevents or interferes some active activities and ADLs    Treatment:  Exercises:  Exercises  Exercise 1: Heel slides 20 reps each  Exercise 2: Quad sets 10 reps  Exercise 3: Straight leg raise x 20 with 2#  Exercise 4: Bent knee fall out 10 reps with red theraband  Exercise 5: Sidelying abduction 20 reps 2#  Exercise 6: Upright bike rocking back and forth 10 minutes-full revolution retro  Exercise 7: Sitting knee extension and flexion 10 reps each 2#  Exercise 8: Sitting hamstring curls with red able to continue    Patient Education: [] NA  PT Education: Home Exercise Program    Post-Pain Assessment:       Pain Rating (0-10 pain scale):   4/10   Location and pain description same as pre-treatment unless indicated. Action: [] NA   [] Perform HEP  [] Meds as prescribed  [] Modalities as prescribed   [] Call Physician     GOALS   Patient Goal(s): Patient goals : Full range of motion of the left knee    Short Term Goals Completed by 3-5 treatments Goal Status   STG 1 Increase active range of motion of the left knee to 90 degrees Met   STG 2 Decrease swelling of the left knee by 1.0cm each measurement Met   STG 3 Pain of the left knee decreased to 3/10 when quiet, 4/10 when walking Met   STG 4       STG 5           Long Term Goals Completed by 5-10 treatments Goal Status   LTG 1 Active range of motion of the left knee 0-110 degrees In progress   LTG 2 Patient will report she is able to put on shoes and socks with less pain and independently In progress   LTG 3 Left knee strength will be 4+/5 to 5/5 In progress   LTG 4 Left hip strength will be 4+/5 to 5/5 In progress   LTG 5 Patient will demo good reciprical gait pattern with a straight cane with no antalgic pattern Met   LTG 6 Long term goal 6: Pain with walking will be reported to be 2/10 or less, 1/10 or less sitting quietly  Long term goal 7: LEFS will be scored greater than 19/80 at discharge LTG Goal 6 Status[de-identified] In progress            Plan:  Frequency/Duration:  Plan  Plan Frequency: 2 times weekly  Plan weeks: 5 weeks  Specific Instructions for Next Treatment: Continue with total knee exercise, vaso pnumatic device for pain and swelling  Current Treatment Recommendations: Strengthening, ROM, Functional mobility training, ADL/Self-care training, Manual Therapy - Joint Manipulation, Manual Therapy - Soft Tissue Mobilization, Gait training, Pain management, Home exercise program  Pt to continue current HEP.   See objective section for any therapeutic exercise changes, additions or modifications this date.     Therapy Time:      PT Individual Minutes  Time In: 1805  Time Out: 3050  Minutes: 60  Timed Code Treatment Minutes: 45 Minutes  Procedure Minutes: 10 minutes cold pack to left knee    Timed Activity code In/out Minutes Units   Ther Ex [id number] 45 3   Manual          Electronically signed by Teodoro Paul, PT on 7/25/22 at 11:55 AM EDT

## 2022-07-29 ENCOUNTER — HOSPITAL ENCOUNTER (OUTPATIENT)
Dept: PHYSICAL THERAPY | Age: 62
Setting detail: THERAPIES SERIES
Discharge: HOME | End: 2022-07-29
Payer: COMMERCIAL

## 2022-07-29 PROCEDURE — 97110 THERAPEUTIC EXERCISES: CPT

## 2022-07-29 ASSESSMENT — PAIN DESCRIPTION - DESCRIPTORS: DESCRIPTORS: TIGHTNESS;SORE

## 2022-07-29 ASSESSMENT — PAIN DESCRIPTION - FREQUENCY: FREQUENCY: CONTINUOUS

## 2022-07-29 ASSESSMENT — PAIN DESCRIPTION - LOCATION: LOCATION: KNEE

## 2022-07-29 ASSESSMENT — PAIN SCALES - GENERAL: PAINLEVEL_OUTOF10: 5

## 2022-07-29 NOTE — PROGRESS NOTES
[address] [facility]  Outpatient Physical Therapy    Treatment Note        Date: 2022  Patient: Chikis Wagner  : 1960   Confirmed: Yes  MRN: [id number]  Referring Provider: Angelito Xavier MD   Secondary Referring Provider (If applicable):     Medical Diagnosis: Presence of left artificial knee joint [Z96.652]    Treatment Diagnosis: S/P left total knee replacement with swelling, decreased strength and decreased gait ability    Visit Information:  Insurance: Payor: Yuni Kennedy / Plan: Leo Loya / Product Type: *No Product type* /   PT Visit Information  Onset Date: 22  Total # of Visits Approved: 75  Total # of Visits to Date: 6  Plan of Care/Certification Expiration Date: 22  No Show: 0  Progress Note Due Date: 22  Canceled Appointment: 0  Progress Note Counter: 6/10    Subjective Information:  Subjective: Patient reports taking medicine earlier and pain is down to 5/10 right now, reports when not doing anything not in pain but hurts with activity. reports having  HEP Compliance:  [x] Good [] Fair [] Poor [] Reports not doing due to:    Pain Screening  Patient Currently in Pain: Yes  Pain Level: 5  Pain Location: Knee  Pain Descriptors: Tightness, Sore  Pain Frequency: Continuous    Treatment:  Exercises:  Exercises  Exercise 1: Heel slides 15 reps + 10 reps with strap for increased range  Exercise 2: Quad sets 10 reps  Exercise 3: Straight leg raise x 20 focus on eliminating quad lag.   Exercise 4: Bent knee fall out 10 reps with red theraband  Exercise 5: Sidelying abduction x 25  Exercise 6: Upright bike rocking back and forth 10 minutes-full revolution  Fwd/retro Seat at 6  Exercise 7: Sitting knee extension and flexion 15 reps each  Exercise 8: Sitting hamstring curls with red theraband 10 reps  Exercise 9: Standing hip extension and abduction 10 reps each leg  yellow theraband  Exercise 13: prone hip extension 20 reps, prone knee flexion 20 reps  Exercise 15: PROM left knee  Treatment Reasoning  Limitations addressed: Flexibility, Mobility, Pain modulation  Therapist provided: Verbal cuing  Progressed: Resistance  Functional ability(s) targeted: Ambulating community distances, Performing self care actvities, Tolerance to age appropriate activities      *Indicates exercise, modality, or manual techniques to be initiated when appropriate    Objective Measures:        Strength: [x] NT  [] MMT completed:      ROM: [] NT  [x] ROM measurements:     AROM LLE (degrees)  L Knee Flexion 0-145: 0-106 AAROM with strap in heel slide positioning  L Knee Extension 0: 0       Balance: [x] NT       Observations: [x] NT       Assessment: Body Structures, Functions, Activity Limitations Requiring Skilled Therapeutic Intervention: Decreased functional mobility , Decreased ADL status, Decreased ROM, Decreased strength, Increased pain  Assessment: Educated pt on pain modulation when away and ways to help improve ROM, pt demonstrated  improvements in AAROM this date. Pt was able to complete full revolutions this date on the Upright bike demoing functional improvements in ROM and practical use of limb. Treatment Diagnosis: S/P left total knee replacement with swelling, decreased strength and decreased gait ability  Therapy Prognosis: Excellent       Patient Education: [x] NA       Post-Pain Assessment:       Pain Rating (0-10 pain scale): 4/10   Location and pain description same as pre-treatment unless indicated.    Action: [] NA   [x] Perform HEP  [] Meds as prescribed  [] Modalities as prescribed   [] Call Physician     GOALS   Patient Goal(s): Patient goals : Full range of motion of the left knee    Short Term Goals Completed by 3-5 treatments Goal Status   STG 1 Increase active range of motion of the left knee to 90 degrees Met   STG 2 Decrease swelling of the left knee by 1.0cm each measurement Met   STG 3 Pain of the left knee decreased to 3/10 when quiet, 4/10 when walking Met Long Term Goals Completed by 5-10 treatments Goal Status   LTG 1 Active range of motion of the left knee 0-110 degrees In progress   LTG 2 Patient will report she is able to put on shoes and socks with less pain and independently In progress   LTG 3 Left knee strength will be 4+/5 to 5/5 In progress   LTG 4 Left hip strength will be 4+/5 to 5/5 In progress   LTG 5 Patient will demo good reciprical gait pattern with a straight cane with no antalgic pattern Met   LTG 6 Long term goal 6: Pain with walking will be reported to be 2/10 or less, 1/10 or less sitting quietly  Long term goal 7: LEFS will be scored greater than 19/80 at discharge LTG Goal 6 Status[de-identified] In progress            Plan:  Frequency/Duration:  Plan  Plan Frequency: 2 times weekly  Plan weeks: 5 weeks  Specific Instructions for Next Treatment: Continue with total knee exercise, vaso pnumatic device for pain and swelling  Current Treatment Recommendations: Strengthening, ROM, Functional mobility training, ADL/Self-care training, Manual Therapy - Joint Manipulation, Manual Therapy - Soft Tissue Mobilization, Gait training, Pain management, Home exercise program  Pt to continue current HEP. See objective section for any therapeutic exercise changes, additions or modifications this date.     Therapy Time:      PT Individual Minutes  Time In: 0830  Time Out: 8708  Minutes: 66  Timed Code Treatment Minutes: 66 Minutes  Procedure Minutes: 0  Timed Activity Minutes Units   Ther Ex 66 4     Electronically signed by Skinny Minor PTA on 7/29/22 at 10:22 AM EDT

## 2022-08-01 ENCOUNTER — HOSPITAL ENCOUNTER (OUTPATIENT)
Dept: PHYSICAL THERAPY | Age: 62
Setting detail: THERAPIES SERIES
Discharge: HOME | End: 2022-08-01
Payer: COMMERCIAL

## 2022-08-01 PROCEDURE — 97110 THERAPEUTIC EXERCISES: CPT | Performed by: PHYSICAL THERAPIST

## 2022-08-01 ASSESSMENT — PAIN SCALES - GENERAL: PAINLEVEL_OUTOF10: 1

## 2022-08-01 ASSESSMENT — PAIN DESCRIPTION - PAIN TYPE: TYPE: SURGICAL PAIN

## 2022-08-01 ASSESSMENT — PAIN DESCRIPTION - DESCRIPTORS: DESCRIPTORS: TIGHTNESS

## 2022-08-01 ASSESSMENT — PAIN DESCRIPTION - ORIENTATION: ORIENTATION: LEFT

## 2022-08-01 ASSESSMENT — PAIN - FUNCTIONAL ASSESSMENT: PAIN_FUNCTIONAL_ASSESSMENT: PREVENTS OR INTERFERES SOME ACTIVE ACTIVITIES AND ADLS

## 2022-08-01 ASSESSMENT — PAIN DESCRIPTION - FREQUENCY: FREQUENCY: CONTINUOUS

## 2022-08-01 ASSESSMENT — PAIN DESCRIPTION - LOCATION: LOCATION: KNEE

## 2022-08-01 NOTE — PROGRESS NOTES
theraband 10 reps  Exercise 9: Standing hip extension and abduction 10 reps each leg  yellow theraband  Exercise 10: 4 inch step ups forward and lateral, 20 reps, one set with left leading and one set with right leading  Exercise 13: prone hip extension 20 reps, prone knee flexion 20 reps  Exercise 14: prone quad str with strap 30s/3  Exercise 15: PROM left knee  Treatment Reasoning  Limitations addressed: Flexibility, Mobility, Pain modulation  Therapist provided: Verbal cuing  Progressed: Resistance                Modalities:  Moist Heat (CPT 60312)  Patient Position: Supine  Number Minutes Moist Heat: 10  Moist heat location: Left  Moist heat specified location: left knee  Post treatment skin assessment: Intact  Limitations addressed: Pain modulation, Tissue extensibility  Cryotherapy (CPT 78891)  Patient Position: Seated  Number Minutes Cryotherapy: 10  Cryotherapy location: Left  Cryotherapy specified location: left knee  Post treatment skin assessment: Intact  Limitations addressed: Pain modulation, Edema  Therapist provided: Assistance       *Indicates exercise, modality, or manual techniques to be initiated when appropriate    Objective Measures:      Strength: [] NT  [x] MMT completed:  General Strength Testing LE: Right WNL  Strength LLE  L Hip Flexion: 4+/5  L Hip Extension: 4+/5  L Hip ABduction: 4+/5  L Hip ADduction: 4+/5  L Knee Flexion: 4-/5  L Knee Extension: 4-/5              ROM: [] NT  [x] ROM measurements:  General AROM LE: Right WNL  AROM LLE (degrees)  L Knee Flexion 0-145: 0-110  L Knee Extension 0: 0             Assessment: Body Structures, Functions, Activity Limitations Requiring Skilled Therapeutic Intervention: Decreased functional mobility , Decreased ADL status, Decreased ROM, Decreased strength, Increased pain  Assessment: Patient is improving in range of motion and exercise ability. Her pain levels are decreasing. She does need further strengthening of the left knee.   Treatment Diagnosis: S/P left total knee replacement with swelling, decreased strength and decreased gait ability  Therapy Prognosis: Excellent  Activity Tolerance  Activity Tolerance: Patient tolerated treatment well    Patient Education: [] NA  PT Education: Home Exercise Program    Post-Pain Assessment:       Pain Rating (0-10 pain scale):   1/10   Location and pain description same as pre-treatment unless indicated. Action: [] NA   [] Perform HEP  [] Meds as prescribed  [] Modalities as prescribed   [] Call Physician     GOALS   Patient Goal(s): Patient goals : Full range of motion of the left knee    Short Term Goals Completed by 3-5 treatments Goal Status   STG 1 Increase active range of motion of the left knee to 90 degrees Met   STG 2 Decrease swelling of the left knee by 1.0cm each measurement Met   STG 3 Pain of the left knee decreased to 3/10 when quiet, 4/10 when walking Met   STG 4       STG 5           Long Term Goals Completed by 5-10 treatments Goal Status   LTG 1 Active range of motion of the left knee 0-110 degrees Met   LTG 2 Patient will report she is able to put on shoes and socks with less pain and independently Met   LTG 3 Left knee strength will be 4+/5 to 5/5 In progress   LTG 4 Left hip strength will be 4+/5 to 5/5 In progress   LTG 5 Patient will demo good reciprical gait pattern with a straight cane with no antalgic pattern Met   LTG 6 Long term goal 6: Pain with walking will be reported to be 2/10 or less, 1/10 or less sitting quietly  Long term goal 7: LEFS will be scored greater than 19/80 at discharge LTG Goal 6 Status[de-identified] In progress            Plan:  Frequency/Duration:  Plan  Plan Frequency: 2 times weekly  Plan weeks: 5 weeks  Specific Instructions for Next Treatment: Continue with total knee exercise, vaso pnumatic device for pain and swelling  Pt to continue current HEP. See objective section for any therapeutic exercise changes, additions or modifications this date.     Therapy Time: PT Individual Minutes  Time In: 2551  Time Out: 1210  Minutes: 65  Timed Code Treatment Minutes: 45 Minutes  Procedure Minutes: 10 minutes moist heat, 10 minutes cold pack  Timed Activity Minutes Units   Ther Ex 45 3   Manual        Electronically signed by Diana Lucas, PT on 8/1/22 at 12:09 PM EDT

## 2022-08-04 ENCOUNTER — HOSPITAL ENCOUNTER (OUTPATIENT)
Dept: PHYSICAL THERAPY | Age: 62
Setting detail: THERAPIES SERIES
Discharge: HOME | End: 2022-08-04
Payer: COMMERCIAL

## 2022-08-04 PROCEDURE — 97110 THERAPEUTIC EXERCISES: CPT

## 2022-08-04 ASSESSMENT — PAIN DESCRIPTION - ORIENTATION: ORIENTATION: LEFT

## 2022-08-04 ASSESSMENT — PAIN DESCRIPTION - PAIN TYPE: TYPE: SURGICAL PAIN

## 2022-08-04 ASSESSMENT — PAIN DESCRIPTION - DESCRIPTORS: DESCRIPTORS: TIGHTNESS

## 2022-08-04 ASSESSMENT — PAIN SCALES - GENERAL: PAINLEVEL_OUTOF10: 2

## 2022-08-04 ASSESSMENT — PAIN DESCRIPTION - LOCATION: LOCATION: KNEE

## 2022-08-04 NOTE — PROGRESS NOTES
[address] [facility]  Outpatient Physical Therapy    Treatment Note        Date: 2022  Patient: Lars Khan  : 1960   Confirmed: Yes  MRN: [id number]  Referring Provider: Stuart Christianson MD   Secondary Referring Provider (If applicable):     Medical Diagnosis: Presence of left artificial knee joint [Z96.652]    Treatment Diagnosis: S/P left total knee replacement with swelling, decreased strength and decreased gait ability    Visit Information:  Insurance: Payor: Miller Shankar / Plan: Lashaun Lopez / Product Type: *No Product type* /   PT Visit Information  Onset Date: 22  Total # of Visits Approved: 75  Total # of Visits to Date: 8  Plan of Care/Certification Expiration Date: 22  No Show: 0  Progress Note Due Date: 22  Canceled Appointment: 0  Progress Note Counter: 8/10    Subjective Information:  Subjective: Pt brought additional orders to therapy today with MD requesting an additional 6 weeks of therapy. Pt reports mild ackes and pain at start of tx session with minimal c/o pain following last tx session. HEP Compliance:  [x] Good [] Fair [] Poor [] Reports not doing due to:    Pain Screening  Patient Currently in Pain: Yes  Pain Assessment: 0-10  Pain Level: 2  Best Pain Level: 1  Worst Pain Level: 2  Pain Type: Surgical pain  Pain Location: Knee  Pain Orientation: Left  Pain Descriptors: Tightness    Treatment:  Exercises:  Exercises  Exercise 2: Quad sets 10 reps  Exercise 3: Straight leg raise x 20 focus on eliminating quad lag. Exercise 5: Sidelying abduction x 20  Exercise 6: Upright bike full rotation level 2 for 6 minutes, seat 6  Exercise 10: 6 inch step ups forward and lateral, 10 reps, one set with left leading and one set with right leading  Exercise 11: Standing  Exercise 13: prone hip extension straight leg x 10 , bent knee x 10  reps, prone knee flexion 20 reps  Exercise 15: SLS Blue Air[employer] Foam 15 s x 3 Pato LE- UE support today for balance.   Exercise LTG 3 Left knee strength will be 4+/5 to 5/5 In progress   LTG 4 Left hip strength will be 4+/5 to 5/5 In progress   LTG 5 Patient will demo good reciprical gait pattern with a straight cane with no antalgic pattern Met   LTG 6 Long term goal 6: Pain with walking will be reported to be 2/10 or less, 1/10 or less sitting quietly  Long term goal 7: LEFS will be scored greater than 19/80 at discharge LTG Goal 6 Status[de-identified] In progress            Plan:  Frequency/Duration:  Plan  Plan Frequency: 2 times weekly  Plan weeks: 5 weeks  Specific Instructions for Next Treatment: Update POC needed Next Visit  Current Treatment Recommendations: Strengthening, ROM, Functional mobility training, ADL/Self-care training, Manual Therapy - Joint Manipulation, Manual Therapy - Soft Tissue Mobilization, Gait training, Pain management, Home exercise program  Pt to continue current HEP. See objective section for any therapeutic exercise changes, additions or modifications this date.     Therapy Time:      PT Individual Minutes  Time In: 9865  Time Out: 1613  Minutes: 55  Timed Code Treatment Minutes: 45 Minutes  Procedure Minutes:CP 10 min   Timed Activity Minutes Units   Ther Ex 45 3     Electronically signed by Abad Hernandes PTA on 8/4/22 at 11:56 AM EDT

## 2022-08-08 ENCOUNTER — HOSPITAL ENCOUNTER (OUTPATIENT)
Dept: PHYSICAL THERAPY | Age: 62
Setting detail: THERAPIES SERIES
Discharge: HOME | End: 2022-08-08
Payer: COMMERCIAL

## 2022-08-08 PROCEDURE — 97110 THERAPEUTIC EXERCISES: CPT | Performed by: PHYSICAL THERAPIST

## 2022-08-08 ASSESSMENT — PAIN DESCRIPTION - DESCRIPTORS: DESCRIPTORS: SHARP

## 2022-08-08 ASSESSMENT — PAIN DESCRIPTION - ORIENTATION: ORIENTATION: LEFT

## 2022-08-08 ASSESSMENT — PAIN DESCRIPTION - LOCATION: LOCATION: KNEE

## 2022-08-08 ASSESSMENT — PAIN SCALES - GENERAL: PAINLEVEL_OUTOF10: 5

## 2022-08-08 ASSESSMENT — PAIN - FUNCTIONAL ASSESSMENT: PAIN_FUNCTIONAL_ASSESSMENT: PREVENTS OR INTERFERES SOME ACTIVE ACTIVITIES AND ADLS

## 2022-08-08 ASSESSMENT — PAIN DESCRIPTION - PAIN TYPE: TYPE: SURGICAL PAIN

## 2022-08-08 NOTE — PROGRESS NOTES
[address] [facility]  Outpatient Physical Therapy    Treatment Note        Date: 2022  Patient: Iliana Villareal  : 1960   Confirmed: Yes  MRN: [id number]  Referring Provider: Germania Ortega MD  Secondary Referring Provider (If applicable):     Medical Diagnosis: Presence of left artificial knee joint [Z96.652] Left total knee replacement 2022  Treatment Diagnosis: S/P left total knee replacement with swelling, decreased strength and decreased gait ability    Visit Information:  Insurance: Payor: Theresa Oneill / Plan: Harini Wadsworth / Product Type: *No Product type* /   PT Visit Information  Onset Date: 22  PT Insurance Information: [facility]  Total # of Visits Approved: 75  Total # of Visits to Date: 9  Plan of Care/Certification Expiration Date: 22  No Show: 0  Progress Note Due Date: 22  Canceled Appointment: 0  Progress Note Counter: 9/10    Subjective Information:  Subjective: Patient states she was getting into her car, and over flexed her knee and the pain resides in the lateral aspect of her knee. She rates it a 5/10.   HEP Compliance:  [x] Good [] Fair [] Poor [] Reports not doing due to:    Pain Screening  Pain Assessment: 0-10  Pain Level: 5  Best Pain Level: 1  Post Treatment Pain Level: 5  Pain Type: Surgical pain  Pain Location: Knee  Pain Orientation: Left  Pain Descriptors: Sharp  Functional Pain Assessment: Prevents or interferes some active activities and ADLs    Treatment:  Exercises:  Exercises  Exercise 7: Sitting knee extension and flexion 15 reps each     Modalities:  Cryotherapy (CPT 48419)  Number Minutes Cryotherapy: 15  Cryotherapy location: Left  Cryotherapy specified location: left knee  Post treatment skin assessment: Intact  Limitations addressed: Pain modulation, Edema  Therapist provided: Assistance       *Indicates exercise, modality, or manual techniques to be initiated when appropriate    Objective Measures:    Strength: [x] NT  [] be 4+/5 to 5/5 In progress   LTG 4 Left hip strength will be 4+/5 to 5/5 In progress   LTG 5 Patient will demo good reciprical gait pattern with a straight cane with no antalgic pattern Met   LTG 6 Long term goal 6: Pain with walking will be reported to be 2/10 or less, 1/10 or less sitting quietly  Long term goal 7: LEFS will be scored greater than 19/80 at discharge LTG Goal 6 Status[de-identified] In progress            Plan:  Frequency/Duration:     Pt to continue current HEP. See objective section for any therapeutic exercise changes, additions or modifications this date.     Therapy Time:      PT Individual Minutes  Time In: 9724  Time Out: 2192  Minutes: 30  Timed Code Treatment Minutes: 20 Minutes  Procedure Minutes: 10 minutes     Timed Activity Minutes Units   Ther Ex 20 1   Manual        Electronically signed by Liv Staff, PT on 8/8/22 at 11:42 AM EDT

## 2022-08-11 ENCOUNTER — HOSPITAL ENCOUNTER (OUTPATIENT)
Dept: PHYSICAL THERAPY | Age: 62
Setting detail: THERAPIES SERIES
Discharge: HOME | End: 2022-08-11
Payer: COMMERCIAL

## 2022-08-11 PROCEDURE — 97110 THERAPEUTIC EXERCISES: CPT

## 2022-08-11 ASSESSMENT — PAIN DESCRIPTION - PAIN TYPE: TYPE: SURGICAL PAIN

## 2022-08-11 ASSESSMENT — PAIN DESCRIPTION - DESCRIPTORS: DESCRIPTORS: ACHING;SHOOTING

## 2022-08-11 ASSESSMENT — PAIN DESCRIPTION - ORIENTATION: ORIENTATION: LEFT;OUTER

## 2022-08-11 ASSESSMENT — PAIN DESCRIPTION - FREQUENCY: FREQUENCY: INTERMITTENT

## 2022-08-11 ASSESSMENT — PAIN DESCRIPTION - LOCATION: LOCATION: KNEE

## 2022-08-11 ASSESSMENT — PAIN SCALES - GENERAL: PAINLEVEL_OUTOF10: 1

## 2022-08-11 NOTE — PROGRESS NOTES
[address] [facility]  Outpatient Physical Therapy    Treatment Note        Date: 2022  Patient: Ann Marie Rodriguez  : 1960   Confirmed: Yes  MRN: [id number]  Referring Provider: Eliza Greenberg MD  Secondary Referring Provider (If applicable):     Medical Diagnosis: Presence of left artificial knee joint [Z96.652]    Treatment Diagnosis: S/P left total knee replacement with swelling, decreased strength and decreased gait ability    Visit Information:  Insurance: Payor: Kelley Salt / Plan: Llana Sacks / Product Type: *No Product type* /   PT Visit Information  Onset Date: 22  Total # of Visits Approved: 75  Total # of Visits to Date: 10  Plan of Care/Certification Expiration Date: 22  No Show: 0  Progress Note Due Date: 22  Canceled Appointment: 0  Progress Note Counter: 10/10, New orders received to continue with therapy 2 times per week for 6 weeks, per Dr. Humaira Singh. Subjective Information:  Subjective: Pt arrived to therapy reporting pain of 1/10 with normal walking, intermitten pain of 6-7/10 that stops me in my track  HEP Compliance:  [x] Good [] Fair [] Poor [] Reports not doing due to:    Pain Screening  Patient Currently in Pain: Yes  Pain Assessment: 0-10  Pain Level: 1  Pain Type: Surgical pain  Pain Location: Knee  Pain Orientation: Left, Outer  Pain Descriptors: Aching, Shooting  Pain Frequency: Intermittent    Treatment:  Exercises:  Exercises  Exercise 1: Heel slides 15 reps + 10 reps with strap for increased range  Exercise 3: Straight leg raise x 20 focus on eliminating quad lag. Exercise 6: Recumb. Bike rocking - full rotation L 1 x 6 min for improved mobility.   Exercise 7: Sitting knee extension and flexion 15 reps each  Exercise 8: Sitting hamstring curls with red theraband 10 reps  Exercise 16: Seated HS Stretches 30sec hold x 3  Exercise 18: Standing Calf Stretches fitter Board 2nd position 30\" x 3           Modalities:  Cryotherapy (CPT 45536)  Patient Position: Seated  Number Minutes Cryotherapy: 10  Cryotherapy location: Left  Cryotherapy specified location: left knee  Post treatment skin assessment: Intact  Limitations addressed: Pain modulation, Edema       *Indicates exercise, modality, or manual techniques to be initiated when appropriate    Objective Measures:        Strength: [x] NT  [] MMT completed:       ROM: [] NT  [] ROM measurements:     AROM LLE (degrees)  L Knee Flexion 0-145: 0-104 with pain. Balance: [x] NT       Observations: [x] NT       Assessment: Body Structures, Functions, Activity Limitations Requiring Skilled Therapeutic Intervention: Decreased functional mobility , Decreased ADL status, Decreased ROM, Decreased strength, Increased pain  Assessment: Pt with decreased AROM of Left knee from previous measurements, pt reports sharp shooting intermitten pain levels, Held muscle testing today due to pain levels. Decreased exercise levels to focus on mobility and decreasing pain levels. Left knee is stable with palpation. Painful along lateral aspect of Left knee at IT attachement site and lateral edge of patella. Edu. pt to continue to focus on gently stretching and use of Ice for pain/edema control. Treatment Diagnosis: S/P left total knee replacement with swelling, decreased strength and decreased gait ability  Therapy Prognosis: Excellent       Patient Education: [x] NA       Post-Pain Assessment:       Pain Rating (0-10 pain scale):   0/10   Location and pain description same as pre-treatment unless indicated.    Action: [] NA   [] Perform HEP  [] Meds as prescribed  [] Modalities as prescribed   [] Call Physician     GOALS   Patient Goal(s): Patient goals : Full range of motion of the left knee    Short Term Goals Completed by 3-5 treatments Goal Status   STG 1 Increase active range of motion of the left knee to 90 degrees Met   STG 2 Decrease swelling of the left knee by 1.0cm each measurement Met   STG 3 Pain of the left knee decreased to 3/10 when quiet, 4/10 when walking Met       Long Term Goals Completed by   Goal Status   LTG 1 Active range of motion of the left knee 0-110 degrees Met   LTG 2 Patient will report she is able to put on shoes and socks with less pain and independently Met   LTG 3 Left knee strength will be 4+/5 to 5/5 In progress   LTG 4 Left hip strength will be 4+/5 to 5/5 In progress   LTG 5 Patient will demo good reciprical gait pattern with a straight cane with no antalgic pattern Met   LTG 6 Long term goal 6: Pain with walking will be reported to be 2/10 or less, 1/10 or less sitting quietly  Long term goal 7: LEFS will be scored greater than 19/80 at discharge LTG Goal 6 Status[de-identified] In progress            Plan:  Frequency/Duration:  Plan  Plan Frequency: 2 times weekly  Plan weeks: 5 weeks  Current Treatment Recommendations: Strengthening, ROM, Functional mobility training, ADL/Self-care training, Manual Therapy - Joint Manipulation, Manual Therapy - Soft Tissue Mobilization, Gait training, Pain management, Home exercise program  Pt to continue current HEP. See objective section for any therapeutic exercise changes, additions or modifications this date.     Therapy Time:      PT Individual Minutes  Time In: 5520  Time Out: 0063  Minutes: 49  Timed Code Treatment Minutes: 39 Minutes  Procedure Minutes:10 CP   Timed Activity Minutes Units   Ther Ex 39 3     Electronically signed by Sarah Hurley PTA on 8/11/22 at 11:46 AM EDT  .e

## 2022-08-11 NOTE — PROGRESS NOTES
[address]  PHYSICAL THERAPY PLAN OF CARE   [address], [address] [facility]              Phone: [phone]  Fax: [phone]      [] Certification  [] Recertification []  Plan of Care  [x] Progress Note [] Discharge      Referring Provider: Selena Perkins MD      From:  Manny Bean PT   Patient: Wilian Beatty (58 y.o. female) : 1960 Date: 2022   Medical Diagnosis: Presence of left artificial knee joint [Z96.652]    Treatment Diagnosis: S/P left total knee replacement with swelling, decreased strength and decreased gait ability    Plan of Care/Certification Expiration Date: 22   Progress Report Period from:  2022  to 2022    Visits to Date: 10 No Show: 0 Cancelled Appts: 0    OBJECTIVE:   Short Term Goals - Time Frame for Short term goals: 3-5 treatments    Goals Current/Discharge status  Status   Short term goal 1:  Increase active range of motion of the left knee to 90 degrees   Met   Short term goal 2: Decrease swelling of the left knee by 1.0cm each measurement   Met   Short term goal 3: Pain of the left knee decreased to 3/10 when quiet, 4/10 when walking   Met                    Long Term Goals -    Goals Current/ Discharge status Status   Long term goal 1: Active range of motion of the left knee 0-110 degrees Today's current AROM 0-104 Met   Long term goal 2: Patient will report she is able to put on shoes and socks with less pain and independently  Met   Long term goal 3: Left knee strength will be 4+/5 to 5/5 Not Tested due to current pain levels In progress   Long term goal 4: Left hip strength will be 4+/5 to 5/5 Not Tested due to current pain levels In progress   Long term goal 5: Patient will demo good reciprical gait pattern with a straight cane with no antalgic pattern Mild antalgic gait without use of AD  Met   Long term goal 6: Pain with walking will be reported to be 2/10 or less, 1/10 or less sitting quietly Pain ranges from 1/10 to 6/10 currently over the last x7 days   In progress    Long term goal 7: LEFS will be scored greater than 19/80 at discharge        Body Structures, Functions, Activity Limitations Requiring Skilled Therapeutic Intervention: Decreased functional mobility , Decreased ADL status, Decreased ROM, Decreased strength, Increased pain  Assessment: Pt with decreased AROM of Left knee from previous measurements, pt reports sharp shooting intermitten pain levels, Held muscle testing today due to pain levels. Decreased exercise levels to focus on mobility and decreasing pain levels. Left knee is stable with palpation. Painful along lateral aspect of Left knee at IT attachement site and lateral edge of patella. Edu. pt to continue to focus on gently stretching and use of Ice for pain/edema control. Therapy Prognosis: Excellent           PLAN: [] Evaluate and Treat  Frequency/Duration:  Plan Frequency: 2 times weekly  Plan weeks: 5 weeks  Current Treatment Recommendations: Strengthening, ROM, Functional mobility training, ADL/Self-care training, Manual Therapy - Joint Manipulation, Manual Therapy - Soft Tissue Mobilization, Gait training, Pain management, Home exercise program     Precautions:                            Patient Status:[] Continue/ Initiate plan of Care    [] Discharge PT. Recommend pt continue with HEP. [x] Additional visits requested, New ordered received for 2 times per week for 6 weeks per Dr. Nabeel Dolan. .    [] Hold         Signature: Objective information taken by: Electronically signed by Feli Jarvis PTA on 8/11/22 at 11:51 AM EDT  Electronically signed by Jojo Rivas PT on 8/11/2022 at 1:49 PM     If you have any questions or concerns, please don't hesitate to call. Thank you for your referral.    I have reviewed this plan of care and certify a need for medically necessary rehabilitation services.     Physician Signature:__________________________________________________________  Date:  Please sign and return

## 2022-08-15 ENCOUNTER — HOSPITAL ENCOUNTER (OUTPATIENT)
Dept: PHYSICAL THERAPY | Age: 62
Setting detail: THERAPIES SERIES
Discharge: HOME | End: 2022-08-15
Payer: COMMERCIAL

## 2022-08-15 PROCEDURE — 97110 THERAPEUTIC EXERCISES: CPT

## 2022-08-15 ASSESSMENT — PAIN DESCRIPTION - ORIENTATION: ORIENTATION: LEFT;OUTER

## 2022-08-15 ASSESSMENT — PAIN DESCRIPTION - DESCRIPTORS: DESCRIPTORS: ACHING

## 2022-08-15 ASSESSMENT — PAIN SCALES - GENERAL: PAINLEVEL_OUTOF10: 2

## 2022-08-15 ASSESSMENT — PAIN DESCRIPTION - PAIN TYPE: TYPE: SURGICAL PAIN

## 2022-08-15 ASSESSMENT — PAIN DESCRIPTION - LOCATION: LOCATION: KNEE

## 2022-08-15 NOTE — PROGRESS NOTES
[address] [facility]  Outpatient Physical Therapy    Treatment Note        Date: 2022  Patient: Rohan Willis  : 1960   Confirmed: Yes  MRN: [id number]  Referring Provider: Justin Dubose MD  Secondary Referring Provider (If applicable):     Medical Diagnosis: Presence of left artificial knee joint [Z96.652]    Treatment Diagnosis: S/P left total knee replacement with swelling, decreased strength and decreased gait ability    Visit Information:  Insurance: Payor: Adrienne So 150 / Plan: Delmer Rinne / Product Type: *No Product type* /   PT Visit Information  Onset Date: 22  Total # of Visits Approved: 75  Total # of Visits to Date: 6  Plan of Care/Certification Expiration Date: 22  No Show: 0  Progress Note Due Date: 22  Canceled Appointment: 0  Progress Note Counter:     Subjective Information:  Subjective: The knee feels like it's getting better, not as sore and stabbing pain has decreased a lot. HEP Compliance:  [x] Good [] Fair [] Poor [] Reports not doing due to:    Pain Screening  Patient Currently in Pain: Yes  Pain Level: 2  Pain Type: Surgical pain  Pain Location: Knee  Pain Orientation: Left, Outer  Pain Descriptors: Aching    Treatment:  Exercises:  Exercises  Exercise 6: Recumb. Bike rocking - full rotation L 1 x 6 min for improved mobility. Exercise 9: Standing hip extension and abduction 10 reps each leg  yellow theraband  Exercise 10: 6 inch step ups forward and lateral, 10-15 reps  Exercise 11: Sit to Stand from 18 inch box x 10  Exercise 15: SLS Blue AirEx Foam 30 s x 3 Pato LE- UE support today for balance.   Exercise 16: Standing  HS Stretches 30sec hold x 3  Exercise 18: Standing Calf Stretches fitter Board 2nd position 30\" x 3       dicates exercise, modality, or manual techniques to be initiated when appropriate    Objective Measures:          Strength: [x] NT  [] MMT completed:        ROM: [] NT  [x] ROM measurements:     AROM LLE (degrees)  L Knee Flexion 0-145: 0-110 with pain. Balance: [x] NT       Assessment: Body Structures, Functions, Activity Limitations Requiring Skilled Therapeutic Intervention: Decreased functional mobility , Decreased ADL status, Decreased ROM, Decreased strength, Increased pain  Assessment: Pt able to return to previous level of exercises following an short period of high pain levels. Pt tolerated this tx well with  no pain reported post tx session AROM continued to improved to previous measurments levels. Will resume activites and progress exercises as toleated moving forward. Treatment Diagnosis: S/P left total knee replacement with swelling, decreased strength and decreased gait ability  Therapy Prognosis: Excellent       Patient Education: [x] NA       Post-Pain Assessment:       Pain Rating (0-10 pain scale):   0/10   Location and pain description same as pre-treatment unless indicated.    Action: [] NA   [x] Perform HEP  [] Meds as prescribed  [] Modalities as prescribed   [] Call Physician     GOALS   Patient Goal(s): Patient goals : Full range of motion of the left knee    Short Term Goals Completed by 3-5 treatments Goal Status   STG 1 Increase active range of motion of the left knee to 90 degrees Met   STG 2 Decrease swelling of the left knee by 1.0cm each measurement Met   STG 3 Pain of the left knee decreased to 3/10 when quiet, 4/10 when walking Met       Long Term Goals Completed by   Goal Status   LTG 1 Active range of motion of the left knee 0-110 degrees Met   LTG 2 Patient will report she is able to put on shoes and socks with less pain and independently Met   LTG 3 Left knee strength will be 4+/5 to 5/5 In progress   LTG 4 Left hip strength will be 4+/5 to 5/5 In progress   LTG 5 Patient will demo good reciprical gait pattern with a straight cane with no antalgic pattern Met   LTG 6 Long term goal 6: Pain with walking will be reported to be 2/10 or less, 1/10 or less sitting quietly  Long term goal 7: LEFS will be scored greater than 19/80 at discharge LTG Goal 6 Status[de-identified] In progress    In progress             Plan:  Frequency/Duration:  Plan  Plan Frequency: 2 times weekly  Plan weeks: 6 weeks  Current Treatment Recommendations: Strengthening, ROM, Functional mobility training, ADL/Self-care training, Manual Therapy - Joint Manipulation, Manual Therapy - Soft Tissue Mobilization, Gait training, Pain management, Home exercise program  Pt to continue current HEP. See objective section for any therapeutic exercise changes, additions or modifications this date.     Therapy Time:      PT Individual Minutes  Time In: 6176  Time Out: 3681  Minutes: 45  Timed Code Treatment Minutes: 45 Minutes  Procedure Minutes:0  Timed Activity Minutes Units   Ther Ex 45 3     Electronically signed by Shalom Aguilar PTA on 8/15/22 at 11:15 AM EDT

## 2022-08-18 ENCOUNTER — HOSPITAL ENCOUNTER (OUTPATIENT)
Dept: PHYSICAL THERAPY | Age: 62
Setting detail: THERAPIES SERIES
Discharge: HOME | End: 2022-08-18
Payer: COMMERCIAL

## 2022-08-18 PROCEDURE — 97110 THERAPEUTIC EXERCISES: CPT

## 2022-08-18 ASSESSMENT — PAIN DESCRIPTION - LOCATION: LOCATION: KNEE

## 2022-08-18 ASSESSMENT — PAIN DESCRIPTION - ORIENTATION: ORIENTATION: LEFT;OUTER

## 2022-08-18 ASSESSMENT — PAIN DESCRIPTION - PAIN TYPE: TYPE: SURGICAL PAIN

## 2022-08-18 ASSESSMENT — PAIN SCALES - GENERAL: PAINLEVEL_OUTOF10: 5

## 2022-08-18 ASSESSMENT — PAIN DESCRIPTION - FREQUENCY: FREQUENCY: INTERMITTENT

## 2022-08-18 ASSESSMENT — PAIN DESCRIPTION - DESCRIPTORS: DESCRIPTORS: SHARP;ACHING

## 2022-08-18 NOTE — PROGRESS NOTES
[address] [facility]  Outpatient Physical Therapy    Treatment Note        Date: 2022  Patient: Dangelo Dupree  : 1960   Confirmed: Yes  MRN: [id number]  Referring Provider: Niru Byrd MD  Secondary Referring Provider (If applicable):     Medical Diagnosis: Presence of left artificial knee joint [Z96.652]    Secondary Diagnosis:     Treatment Diagnosis: S/P left total knee replacement with swelling, decreased strength and decreased gait ability    Visit Information:  Insurance: Payor: Adrienne So 150 / Plan: Martell Sicard / Product Type: *No Product type* /   PT Visit Information  Onset Date: 22  Total # of Visits Approved: 75  Total # of Visits to Date:   Plan of Care/Certification Expiration Date: 22  No Show: 0  Progress Note Due Date: 22  Canceled Appointment: 0  Progress Note Counter:     Subjective Information:  Subjective: I did squats this morning and I'm a little sore, I don't  have pain unless I move in certain directions (abd, rotation) shoots up to 4-5/10 but its short  HEP Compliance:  [x] Good [] Fair [] Poor [] Reports not doing due to:    Pain Screening  Patient Currently in Pain: Yes  Pain Level: 5  Pain Type: Surgical pain  Pain Location: Knee  Pain Orientation: Left, Outer  Pain Descriptors: Sharp, Aching  Pain Frequency: Intermittent    Treatment:  Exercises:  Exercises  Exercise 6: Recumb. Bike  L 1 x 6 min for improved mobility. Exercise 7: Sitting knee extension 10 reps MITA ytb  Exercise 9: Standing hip extension and abduction 10 reps each leg  yellow theraband complete abduction superior to knee with RTB next visit  Exercise 10: 6 inch step ups forward and lateral, 20 reps  Exercise 11: Sit to Stand from 18 inch box x 5 observing form dt completing prior to tx  Exercise 15: SLS Blue AirEx Foam 30 s x 3 Mita LE- UE support today for balance.   Exercise 16: Standing  HS Stretches 30sec hold x 3 at stairs mita  Exercise 18: Standing Calf Stretches fitter Board 2nd position 30\" x 3       *Indicates exercise, modality, or manual techniques to be initiated when appropriate    Objective Measures:       Strength: [x] NT  [] MMT completed:      ROM: [x] NT  [] ROM measurements:      Balance: [x] NT       Assessment: Body Structures, Functions, Activity Limitations Requiring Skilled Therapeutic Intervention: Decreased functional mobility , Decreased ADL status, Decreased ROM, Decreased strength, Increased pain  Assessment: Pt presented this date having already worked out, and with transient sharp pains in the lateral knee raising to about 3-4/10 at end of tx with certain movements. Made modifications this date to exercises for improved tolerance and progression. progressed reps this date with good results. Treatment Diagnosis: S/P left total knee replacement with swelling, decreased strength and decreased gait ability  Therapy Prognosis: Excellent       Patient Education: [x] NA       Post-Pain Assessment:       Pain Rating (0-10 pain scale):   0 /10 transient pains throughout 3-4/10  Location and pain description same as pre-treatment unless indicated.    Action: [] NA   [x] Perform HEP  [] Meds as prescribed  [] Modalities as prescribed   [] Call Physician     GOALS   Patient Goal(s): Patient goals : Full range of motion of the left knee    Short Term Goals Completed by 3-5 treatments Goal Status   STG 1 Increase active range of motion of the left knee to 90 degrees Met   STG 2 Decrease swelling of the left knee by 1.0cm each measurement Met   STG 3 Pain of the left knee decreased to 3/10 when quiet, 4/10 when walking Met     Long Term Goals Completed by   Goal Status   LTG 1 Active range of motion of the left knee 0-110 degrees Met   LTG 2 Patient will report she is able to put on shoes and socks with less pain and independently Met   LTG 3 Left knee strength will be 4+/5 to 5/5 In progress   LTG 4 Left hip strength will be 4+/5 to 5/5 In progress LTG 5 Patient will demo good reciprical gait pattern with a straight cane with no antalgic pattern Met   LTG 6 Long term goal 6: Pain with walking will be reported to be 2/10 or less, 1/10 or less sitting quietly  Long term goal 7: LEFS will be scored greater than 19/80 at discharge LTG Goal 6 Status[de-identified] In progress    LTG 7 status: in progress            Plan:  Frequency/Duration:  Plan  Plan Frequency: 2 times weekly  Plan weeks: 6 weeks  Current Treatment Recommendations: Strengthening, ROM, Functional mobility training, ADL/Self-care training, Manual Therapy - Joint Manipulation, Manual Therapy - Soft Tissue Mobilization, Gait training, Pain management, Home exercise program  Pt to continue current HEP. See objective section for any therapeutic exercise changes, additions or modifications this date.     Therapy Time:      PT Individual Minutes  Time In: 1101  Time Out: 3797  Minutes: 54  Timed Code Treatment Minutes: 54 Minutes  Procedure Minutes:0  Timed Activity Minutes Units   Ther Ex 54 4     Electronically signed by Molli Babinski, PTA on 8/18/22 at 11:59 AM EDT

## 2022-08-23 ENCOUNTER — HOSPITAL ENCOUNTER (OUTPATIENT)
Dept: PHYSICAL THERAPY | Age: 62
Setting detail: THERAPIES SERIES
Discharge: HOME | End: 2022-08-23
Payer: COMMERCIAL

## 2022-08-23 PROCEDURE — 97110 THERAPEUTIC EXERCISES: CPT

## 2022-08-23 ASSESSMENT — PAIN DESCRIPTION - DESCRIPTORS: DESCRIPTORS: TIGHTNESS

## 2022-08-23 ASSESSMENT — PAIN SCALES - GENERAL: PAINLEVEL_OUTOF10: 2

## 2022-08-23 ASSESSMENT — PAIN DESCRIPTION - LOCATION: LOCATION: KNEE

## 2022-08-23 ASSESSMENT — PAIN DESCRIPTION - ORIENTATION: ORIENTATION: LEFT

## 2022-08-23 NOTE — PROGRESS NOTES
[facility]  Outpatient Physical Therapy    Treatment Note        Date: 2022  Patient: Adrian Hammond  : 1960   Confirmed: Yes  MRN: [id number]  Referring Provider: Geovanna Streeter MD  Secondary Referring Provider (If applicable):     Medical Diagnosis: Presence of left artificial knee joint [Z96.652]    Treatment Diagnosis: S/P left total knee replacement with swelling, decreased strength and decreased gait ability    Visit Information:  Insurance: Payor: Annalise Saliva / Plan: Kayla Pilon / Product Type: *No Product type* /   PT Visit Information  Onset Date: 22  Total # of Visits Approved: 75  Total # of Visits to Date: 13  Plan of Care/Certification Expiration Date: 22  No Show: 0  Progress Note Due Date: 22  Canceled Appointment: 0  Progress Note Counter: 3/12    Subjective Information:  Subjective: Patient reports stiffness after [place] the rec [place] watching Ice Hockey yesterday. Occ discomfort while exiting car with patient displaying hip IR motion. Would like to be able to ambulate on the sand, will be going to [facility] in Oct.  HEP Compliance:  [x] Good [] Fair [] Poor [] Reports not doing due to:    Pain Screening  Patient Currently in Pain: Yes  Pain Level: 2  Pain Location: Knee  Pain Orientation: Left  Pain Descriptors: Tightness    Treatment:  Exercises:  Exercises  Exercise 2: Amb on uneven surface*  Exercise 4: LE pivots onto 6'' box x10 Pato  Exercise 6: Recumb. Bike  L 2 x 6 min for improved mobility. Exercise 10: 6'' foam step ups x10 F/L to improve LE strength and stability  Exercise 14: Knee flexion str on 6'' step 3/30sec  Exercise 17:  Monster walks YTB around ankles F/L/R with focus on neutral LE 20ft x2  Exercise 20: HEP: Monster walks YTB    *Indicates exercise, modality, or manual techniques to be initiated when appropriate    Objective Measures:           ROM: [] NT  [x] ROM measurements:     AROM LLE (degrees)  L Knee Flexion 0-145: 0-110 with pain/tightness            Assessment: Body Structures, Functions, Activity Limitations Requiring Skilled Therapeutic Intervention: Decreased functional mobility , Decreased ADL status, Decreased ROM, Decreased strength, Increased pain  Assessment: Progressed multiple strengthening exercises such as step ups onto compliant surface to challenge stability and monster walks with resistance bands. Initiated pivots onto 6' box with focus on all one hip motion vs stress distal to knee through IR/ER. No change in knee flexion ROM, pain/tight endfeel reported by patient. Treatment Diagnosis: S/P left total knee replacement with swelling, decreased strength and decreased gait ability  Therapy Prognosis: Excellent          Post-Pain Assessment:       Pain Rating (0-10 pain scale):   1/10   Location and pain description same as pre-treatment unless indicated.    Action: [] NA   [] Perform HEP  [] Meds as prescribed  [x] Modalities as prescribed   [] Call Physician     GOALS   Patient Goal(s): Patient goals : Full range of motion of the left knee    Short Term Goals Completed by 3-5 treatments Goal Status   STG 1 Increase active range of motion of the left knee to 90 degrees Met   STG 2 Decrease swelling of the left knee by 1.0cm each measurement Met   STG 3 Pain of the left knee decreased to 3/10 when quiet, 4/10 when walking Met     Long Term Goals Completed by   Goal Status   LTG 1 Active range of motion of the left knee 0-110 degrees Met   LTG 2 Patient will report she is able to put on shoes and socks with less pain and independently Met   LTG 3 Left knee strength will be 4+/5 to 5/5 In progress   LTG 4 Left hip strength will be 4+/5 to 5/5 In progress   LTG 5 Patient will demo good reciprical gait pattern with a straight cane with no antalgic pattern Met   LTG 6 Long term goal 6: Pain with walking will be reported to be 2/10 or less, 1/10 or less sitting quietly   In progress   Long term goal 7: LEFS will be scored greater than 19/80 at discharge In progress            Plan:  Frequency/Duration:  Plan  Plan Frequency: 2 times weekly  Plan weeks: 6 weeks  Current Treatment Recommendations: Strengthening, ROM, Functional mobility training, ADL/Self-care training, Manual Therapy - Joint Manipulation, Manual Therapy - Soft Tissue Mobilization, Gait training, Pain management, Home exercise program  Pt to continue current HEP. See objective section for any therapeutic exercise changes, additions or modifications this date.     Therapy Time:      PT Individual Minutes  Time In: 9615  Time Out: 2605  Minutes: 55  Timed Code Treatment Minutes: 55 Minutes  Procedure Minutes:0  Timed Activity Minutes Units   Ther Ex 55 4     Electronically signed by Kimmie Gamble PTA on 8/23/22 at 9:27 AM EDT

## 2022-08-26 ENCOUNTER — HOSPITAL ENCOUNTER (OUTPATIENT)
Dept: PHYSICAL THERAPY | Age: 62
Setting detail: THERAPIES SERIES
Discharge: HOME | End: 2022-08-26
Payer: COMMERCIAL

## 2022-08-26 PROCEDURE — 97110 THERAPEUTIC EXERCISES: CPT

## 2022-08-26 ASSESSMENT — PAIN DESCRIPTION - PAIN TYPE: TYPE: SURGICAL PAIN

## 2022-08-26 ASSESSMENT — PAIN DESCRIPTION - ORIENTATION: ORIENTATION: LEFT

## 2022-08-26 ASSESSMENT — PAIN DESCRIPTION - DESCRIPTORS: DESCRIPTORS: TIGHTNESS

## 2022-08-26 ASSESSMENT — PAIN DESCRIPTION - LOCATION: LOCATION: KNEE

## 2022-08-26 ASSESSMENT — PAIN SCALES - GENERAL: PAINLEVEL_OUTOF10: 2

## 2022-08-26 NOTE — PROGRESS NOTES
[address] [facility]  Outpatient Physical Therapy    Treatment Note        Date: 2022  Patient: Ernesto Bass  : 1960   Confirmed: Yes  MRN: [id number]  Referring Provider: Asher Nogueira MD  Secondary Referring Provider (If applicable):     Medical Diagnosis: Presence of left artificial knee joint [Z96.652]    Secondary Diagnosis:     Treatment Diagnosis: S/P left total knee replacement with swelling, decreased strength and decreased gait ability    Visit Information:  Insurance: Payor: Brittany Castellanos / Plan: [employer] / Product Type: *No Product type* /   PT Visit Information  Onset Date: 22  Total # of Visits Approved: 75  Total # of Visits to Date: 14  Plan of Care/Certification Expiration Date: 22  No Show: 0  Progress Note Due Date: 22  Canceled Appointment: 0  Progress Note Counter:     Subjective Information:     HEP Compliance:  [x] Good [] Fair [] Poor [] Reports not doing due to:    Pain Screening  Patient Currently in Pain: Yes  Pain Assessment: 0-10  Pain Level: 2  Pain Type: Surgical pain  Pain Location: Knee  Pain Orientation: Left  Pain Descriptors: Tightness    Treatment:  Exercises:  Exercises  Exercise 5: Reviewed Quad stretches performed from prone with strap and Mod Benny stretch with strap 30\" x 3 ea. Exercise 6: Recumb. Bike  L 2 x 6 min for improved mobility. Exercise 10: 6'' step ups x10 F/L to improve LE strength and stability  Exercise 11: Sit to Stand from 18 inch box x 15 / x5 @ 16\"  Exercise 14: Knee flexion str on 6'' step 3/30sec  Exercise 15: SLS Blue AirEx Foam 30 s x 3 Pato LE No UE support needed today. Exercise 18: Standing Calf Stretches fitter Board 2nd position 30\" x 3         Objective Measures:       Strength: [x] NT  [] MMT completed:       ROM: [] NT  [x] ROM measurements:     AROM LLE (degrees)  L Knee Flexion 0-145: 0-113            Balance: [x] NT       Assessment:    Body Structures, Functions, Activity Limitations Requiring Skilled Therapeutic Intervention: Decreased functional mobility , Decreased ADL status, Decreased ROM, Decreased strength, Increased pain  Assessment: Continue to challenge pt with progression of exercises, added Rocker Board today to challeng pt's reactions with weight shifting for walkin on uneven surfaces. Edu. pt on additional stretches for Left Quad, pt declined print outs of stretches, stating \"I can remember these\" encouraged pt to make time daily to stretch to improve Left knee mobility. Treatment Diagnosis: S/P left total knee replacement with swelling, decreased strength and decreased gait ability  Therapy Prognosis: Excellent       Patient Education: [x] NA       Post-Pain Assessment:       Pain Rating (0-10 pain scale):   2/10   Location and pain description same as pre-treatment unless indicated.    Action: [] NA   [x] Perform HEP  [] Meds as prescribed  [] Modalities as prescribed   [] Call Physician     GOALS   Patient Goal(s): Patient goals : Full range of motion of the left knee    Short Term Goals Completed by 3-5 treatments Goal Status   STG 1 Increase active range of motion of the left knee to 90 degrees Met   STG 2 Decrease swelling of the left knee by 1.0cm each measurement Met   STG 3 Pain of the left knee decreased to 3/10 when quiet, 4/10 when walking Met       Long Term Goals Completed by   Goal Status   LTG 1 Active range of motion of the left knee 0-110 degrees Met   LTG 2 Patient will report she is able to put on shoes and socks with less pain and independently Met   LTG 3 Left knee strength will be 4+/5 to 5/5 In progress   LTG 4 Left hip strength will be 4+/5 to 5/5 In progress   LTG 5 Patient will demo good reciprical gait pattern with a straight cane with no antalgic pattern Met   LTG 6 Long term goal 6: Pain with walking will be reported to be 2/10 or less, 1/10 or less sitting quietly  Long term goal 7: LEFS will be scored greater than 19/80 at discharge LTG Goal 6 Status[de-identified] In progress    In progress             Plan:  Frequency/Duration:  Plan  Plan Frequency: 2 times weekly  Plan weeks: 6 weeks  Current Treatment Recommendations: Strengthening, ROM, Functional mobility training, ADL/Self-care training, Manual Therapy - Joint Manipulation, Manual Therapy - Soft Tissue Mobilization, Gait training, Pain management, Home exercise program  Pt to continue current HEP. See objective section for any therapeutic exercise changes, additions or modifications this date.     Therapy Time:      PT Individual Minutes  Time In: 2321  Time Out: 1108  Minutes: 48  Timed Code Treatment Minutes: 48 Minutes  Procedure Minutes:0  Timed Activity Minutes Units   Ther Ex 48 3     Electronically signed by Delfino Perales PTA on 8/26/22 at 11:54 AM EDT

## 2022-08-30 ENCOUNTER — HOSPITAL ENCOUNTER (OUTPATIENT)
Dept: PHYSICAL THERAPY | Age: 62
Setting detail: THERAPIES SERIES
Discharge: HOME | End: 2022-08-30
Payer: COMMERCIAL

## 2022-08-30 PROCEDURE — 97110 THERAPEUTIC EXERCISES: CPT

## 2022-08-30 NOTE — PROGRESS NOTES
[address] [facility]  Outpatient Physical Therapy    Treatment Note        Date: 2022  Patient: Angelica Meek  : 1960   Confirmed: Yes  MRN: [id number]  Referring Provider: Ralph Menendez MD  Secondary Referring Provider (If applicable):     Medical Diagnosis: Presence of left artificial knee joint [Z96.652]    Secondary Diagnosis:     Treatment Diagnosis: S/P left total knee replacement with swelling, decreased strength and decreased gait ability    Visit Information:  Insurance: Payor: Eh Garcia / Plan: [employer] / Product Type: *No Product type* /   PT Visit Information  Onset Date: 22  Total # of Visits Approved: 75  Total # of Visits to Date: 15  Plan of Care/Certification Expiration Date: 22  No Show: 0  Progress Note Due Date: 22  Canceled Appointment: 0  Progress Note Counter:     Subjective Information:  Subjective: Pt reports fatigue and muscle soreness from previous tx session, no current pain or complaints. HEP Compliance:  [x] Good [] Fair [] Poor [] Reports not doing due to:    Pain Screening  Patient Currently in Pain: Denies    Treatment:  Exercises:  Exercises  Exercise 5: Quad stretches performed from prone with strap and Mod Benny stretch with strap 30\" x 3 ea. Exercise 6: Recumb. Bike  L 2 x 6 min for improved mobility. Exercise 10: 8'' step ups x15 F/L to improve LE strength and stability  Exercise 11: Sit to Stand from 18 inch box x 15 with 4# ball  Exercise 13: Sled Push/Pull Empty 30' x 3  Exercise 15: SLS Blue AirEx Foam 30 s x 3 Pato LE No UE support needed today. Exercise 17: Monster walks RTB around ankles F/L/R with focus on neutral LE 20ft x2  Exercise 18: Standing Calf Stretches fitter Board 2nd position 30\" x 3         Objective Measures:          Strength: [x] NT  [] MMT completed:       ROM: [x] NT  [] ROM measurements:       Balance: [] NT   Able to hold SLS on AirEx foam without UE support. Assessment:    Body Plan:  Frequency/Duration:  Plan  Plan Frequency: 2 times weekly  Plan weeks: 6 weeks  Specific Instructions for Next Treatment: DC Next Visit  Current Treatment Recommendations: Strengthening, ROM, Functional mobility training, ADL/Self-care training, Manual Therapy - Joint Manipulation, Manual Therapy - Soft Tissue Mobilization, Gait training, Pain management, Home exercise program  Pt to continue current HEP. See objective section for any therapeutic exercise changes, additions or modifications this date.     Therapy Time:      PT Individual Minutes  Time In: 6931  Time Out: 8038  Minutes: 41  Timed Code Treatment Minutes: 41 Minutes  Procedure Minutes:0  Timed Activity Minutes Units   Ther Ex 41 3     Electronically signed by Sasha Fortune PTA on 8/30/22 at 4:50 PM EDT

## 2022-09-02 ENCOUNTER — HOSPITAL ENCOUNTER (OUTPATIENT)
Dept: PHYSICAL THERAPY | Age: 62
Setting detail: THERAPIES SERIES
Discharge: HOME | End: 2022-09-02
Payer: COMMERCIAL

## 2022-09-02 PROCEDURE — 97140 MANUAL THERAPY 1/> REGIONS: CPT

## 2022-09-02 NOTE — PROGRESS NOTES
[address]  PHYSICAL THERAPY PLAN OF CARE   [address], [address] [facility]              Phone: [phone]  Fax: [phone]      [] Certification  [] Recertification []  Plan of Care  [] Progress Note [x] Discharge      Referring Provider: Justin Dubose MD   From:  Broderick Lind PT, DPT   Patient: Rohan Willis (58 y.o. female) : 1960 Date: 2022   Medical Diagnosis: Presence of left artificial knee joint [Z96.652]    Treatment Diagnosis: S/P left total knee replacement with swelling, decreased strength and decreased gait ability    Plan of Care/Certification Expiration Date: 22   Progress Report Period from:  2022  to 2022    Visits to Date: 16 No Show: 0 Cancelled Appts: 0    OBJECTIVE:   Short Term Goals - Time Frame for Short term goals: 3-5 treatments    Goals Current/Discharge status  Status   Short term goal 1: Increase active range of motion of the left knee to 90 degrees  This goal was previously met Met   Short term goal 2: Decrease swelling of the left knee by 1.0cm each measurement  This goal was previously met Met   Short term goal 3: Pain of the left knee decreased to 3/10 when quiet, 4/10 when walking  This goal was previously met Met     Long Term Goals -    Goals Current/ Discharge status Status   Long term goal 1: Active range of motion of the left knee 0-110 degrees Pt limited by hip pain this date but has actively shown she is able to attain 110° of flexion. AAROM this date 80.        AROM LLE (degrees)  L Knee Flexion 0-145: 0-118 AAROM 107 arom  Met   Long term goal 2: Patient will report she is able to put on shoes and socks with less pain and independently This goal was previously met Met   Long term goal 3: Left knee strength will be 4+/5 to 5/5 Strength LLE  L Knee Flexion: 5/5  L Knee Extension: 5/5               Met   Long term goal 4: Left hip strength will be 4+/5 to 5/5 Strength LLE  L Hip Flexion: 4+/5  L Hip Extension: 4+/5  L Hip ABduction: 5/5  L Hip ADduction: 5/5  L Hip Internal Rotation: 5/5  L Hip External Rotation: 4+/5 Met   Long term goal 5: Patient will demo good reciprical gait pattern with a straight cane with no antalgic pattern This goal was previously met Met   Long term goal 6: Pain with walking will be reported to be 2/10 or less, 1/10 or less sitting quietly Pt reports pain at being around a 1 with prolonged walking but most of the time she does not notice. Met   Long term goal 7: LEFS will be scored greater than 19/80 at discharge 65/80 pt met this goal this date   Met     Body Structures, Functions, Activity Limitations Requiring Skilled Therapeutic Intervention: Decreased functional mobility , Decreased ADL status, Decreased ROM, Decreased strength, Increased pain  Assessment: Pt with good progress towards goals meeting all goals at this time. Pt feels able to independently self manage HEP at this time. D/C further PT. Therapy Prognosis: Excellent    PLAN:   Plan weeks: DC  Current Treatment Recommendations: Strengthening, ROM, Functional mobility training, ADL/Self-care training, Manual Therapy - Joint Manipulation, Manual Therapy - Soft Tissue Mobilization, Gait training, Pain management, Home exercise program  Plan Comment: DC       Patient Status:[] Continue/ Initiate plan of Care    [x] Discharge PT. Recommend pt continue with HEP. [] Additional visits requested, Please re-certify for additional visits:    [] Hold         Signature: Electronically signed by Susannah Lambert PT on 9/6/2022 at 10:23 AM  Objective information taken by: Electronically signed by Griselda Chow PTA on 9/2/22 at 11:46 AM EDT    If you have any questions or concerns, please don't hesitate to call. Thank you for your referral.    I have reviewed this plan of care and certify a need for medically necessary rehabilitation services.     Physician Signature:__________________________________________________________  Date:  Please sign and return

## 2022-09-02 NOTE — PROGRESS NOTES
[address] [facility]  Outpatient Physical Therapy    Treatment Note        Date: 2022  Patient: Ann Marie Rodriguez  : 1960   Confirmed: Yes  MRN: [id number]  Referring Provider: Eliza Greenberg MD  Secondary Referring Provider (If applicable):     Medical Diagnosis: Presence of left artificial knee joint [Z96.652]    Secondary Diagnosis:     Treatment Diagnosis: S/P left total knee replacement with swelling, decreased strength and decreased gait ability    Visit Information:  Insurance: Payor: Kelley Salt / Plan: Llana Sacks / Product Type: *No Product type* /   PT Visit Information  Onset Date: 22  Total # of Visits Approved: 75  Total # of Visits to Date: 12  Plan of Care/Certification Expiration Date: 22  No Show: 0  Progress Note Due Date: 22  Canceled Appointment: 0  Progress Note Counter:     Subjective Information:  Subjective: Pt reports not having too muchpain with activites, does report increased pain when trying to work on flexion but nothing too painful. HEP Compliance:  [x] Good [] Fair [] Poor [] Reports not doing due to:    Pain Screening  Patient Currently in Pain: Denies    Treatment:  Exercises:  Exercises  Exercise 6: Recumb. Bike  L 2 x 6 min for improved mobility. Manual:   Manual Therapy  Other: obj measures 25'  Total manual time 25'       *Indicates exercise, modality, or manual techniques to be initiated when appropriate    Objective Measures:       Strength: [] NT  [x] MMT completed:     Strength LLE  L Hip Flexion: 4+/5  L Hip Extension: 4+/5  L Hip ABduction: 5/5  L Hip ADduction: 5/5  L Hip Internal Rotation: 5/5  L Hip External Rotation: 4+/5  L Knee Flexion: 5/5  L Knee Extension: 5/5              ROM: [] NT  [x] ROM measurements:     AROM LLE (degrees)  L Knee Flexion 0-145: 0-118 AAROM 107 arom       Balance: [x] NT       Assessment:    Body Structures, Functions, Activity Limitations Requiring Skilled Therapeutic Intervention: Decreased functional mobility , Decreased ADL status, Decreased ROM, Decreased strength, Increased pain  Assessment: Pt met the remainder of her goals this date. pt feels comfortable self managing from this point forward. pt presented with transient pain in hip this date  with increased flexion in the hip. Treatment Diagnosis: S/P left total knee replacement with swelling, decreased strength and decreased gait ability  Therapy Prognosis: Excellent       Patient Education: [x] Educated pt on how to decrease the imbalances in muscles between LEs       Post-Pain Assessment:       Pain Rating (0-10 pain scale):   0/10   Location and pain description same as pre-treatment unless indicated.    Action: [] NA   [x] Perform HEP  [] Meds as prescribed  [] Modalities as prescribed   [] Call Physician     GOALS   Patient Goal(s): Patient goals : Full range of motion of the left knee    Short Term Goals Completed by 3-5 treatments Goal Status   STG 1 Increase active range of motion of the left knee to 90 degrees Met   STG 2 Decrease swelling of the left knee by 1.0cm each measurement Met   STG 3 Pain of the left knee decreased to 3/10 when quiet, 4/10 when walking Met     Long Term Goals Completed by   Goal Status   LTG 1 Active range of motion of the left knee 0-110 degrees Met   LTG 2 Patient will report she is able to put on shoes and socks with less pain and independently Met   LTG 3 Left knee strength will be 4+/5 to 5/5 Met   LTG 4 Left hip strength will be 4+/5 to 5/5 Met   LTG 5 Patient will demo good reciprical gait pattern with a straight cane with no antalgic pattern Met   LTG 6 Pain with walking will be reported to be 2/10 or less, 1/10 or less sitting quietly Met   LTG 7 LEFS will be scored greater than 19/80 at discharge Met            Plan:  Frequency/Duration:  Plan  Plan weeks: DC  Current Treatment Recommendations: Strengthening, ROM, Functional mobility training, ADL/Self-care training, Manual Therapy - Joint Manipulation, Manual Therapy - Soft Tissue Mobilization, Gait training, Pain management, Home exercise program  Plan Comment: DC  Pt to continue current HEP. See objective section for any therapeutic exercise changes, additions or modifications this date.     Therapy Time:      PT Individual Minutes  Time In: 1104  Time Out: 1361  Minutes: 30  Timed Code Treatment Minutes: 30 Minutes  Procedure Minutes:0   Timed Activity Minutes Units   Ther Ex 5 0   Manual  Obj measures  25 2     Electronically signed by Kristyn Armstrong PTA on 9/2/22 at 11:55 AM EDT

## 2022-09-06 ENCOUNTER — HOSPITAL ENCOUNTER (OUTPATIENT)
Dept: PHYSICAL THERAPY | Age: 62
Setting detail: THERAPIES SERIES
End: 2022-09-06
Payer: COMMERCIAL

## 2022-09-08 ENCOUNTER — APPOINTMENT (OUTPATIENT)
Dept: PHYSICAL THERAPY | Age: 62
End: 2022-09-08
Payer: COMMERCIAL

## 2022-09-13 ENCOUNTER — APPOINTMENT (OUTPATIENT)
Dept: PHYSICAL THERAPY | Age: 62
End: 2022-09-13
Payer: COMMERCIAL

## 2022-09-16 ENCOUNTER — APPOINTMENT (OUTPATIENT)
Dept: PHYSICAL THERAPY | Age: 62
End: 2022-09-16
Payer: COMMERCIAL

## 2022-09-20 ENCOUNTER — APPOINTMENT (OUTPATIENT)
Dept: PHYSICAL THERAPY | Age: 62
End: 2022-09-20
Payer: COMMERCIAL

## 2022-09-23 ENCOUNTER — APPOINTMENT (OUTPATIENT)
Dept: PHYSICAL THERAPY | Age: 62
End: 2022-09-23
Payer: COMMERCIAL

## 2022-09-27 ENCOUNTER — APPOINTMENT (OUTPATIENT)
Dept: PHYSICAL THERAPY | Age: 62
End: 2022-09-27
Payer: COMMERCIAL

## 2022-09-30 ENCOUNTER — APPOINTMENT (OUTPATIENT)
Dept: PHYSICAL THERAPY | Age: 62
End: 2022-09-30
Payer: COMMERCIAL

## 2023-03-31 DIAGNOSIS — E78.2 MIXED HYPERLIPIDEMIA: ICD-10-CM

## 2023-03-31 RX ORDER — ATORVASTATIN CALCIUM 20 MG/1
TABLET, FILM COATED ORAL
Qty: 90 TABLET | Refills: 1 | Status: SHIPPED | OUTPATIENT
Start: 2023-03-31 | End: 2023-12-15

## 2023-04-05 ENCOUNTER — HOSPITAL ENCOUNTER (OUTPATIENT)
Dept: WOMENS IMAGING | Age: 63
Discharge: HOME | End: 2023-04-07
Payer: COMMERCIAL

## 2023-04-05 DIAGNOSIS — Z12.31 BREAST CANCER SCREENING BY MAMMOGRAM: ICD-10-CM

## 2023-04-05 PROCEDURE — 77063 BREAST TOMOSYNTHESIS BI: CPT

## 2023-04-06 DIAGNOSIS — R92.8 ABNORMAL MAMMOGRAM: ICD-10-CM

## 2023-04-06 DIAGNOSIS — R73.03 PREDIABETES: ICD-10-CM

## 2023-04-06 RX ORDER — EMPAGLIFLOZIN 10 MG/1
TABLET, FILM COATED ORAL
Qty: 90 TABLET | Refills: 0 | Status: SHIPPED | OUTPATIENT
Start: 2023-04-06 | End: 2023-07-05

## 2023-04-14 ENCOUNTER — OFFICE VISIT (OUTPATIENT)
Dept: PRIMARY CARE | Facility: CLINIC | Age: 63
End: 2023-04-14
Payer: COMMERCIAL

## 2023-04-14 VITALS
HEART RATE: 76 BPM | WEIGHT: 222 LBS | SYSTOLIC BLOOD PRESSURE: 126 MMHG | BODY MASS INDEX: 34.84 KG/M2 | TEMPERATURE: 97.4 F | DIASTOLIC BLOOD PRESSURE: 68 MMHG | OXYGEN SATURATION: 97 % | RESPIRATION RATE: 16 BRPM | HEIGHT: 67 IN

## 2023-04-14 DIAGNOSIS — E78.2 MIXED HYPERLIPIDEMIA: ICD-10-CM

## 2023-04-14 DIAGNOSIS — L71.9 ROSACEA: ICD-10-CM

## 2023-04-14 DIAGNOSIS — E11.9 TYPE 2 DIABETES MELLITUS WITHOUT COMPLICATION, WITHOUT LONG-TERM CURRENT USE OF INSULIN (MULTI): ICD-10-CM

## 2023-04-14 DIAGNOSIS — E03.9 BORDERLINE HYPOTHYROIDISM: ICD-10-CM

## 2023-04-14 DIAGNOSIS — I10 ESSENTIAL HYPERTENSION: ICD-10-CM

## 2023-04-14 DIAGNOSIS — G25.0 ESSENTIAL TREMOR: ICD-10-CM

## 2023-04-14 DIAGNOSIS — A53.9 SYPHILIS: Primary | ICD-10-CM

## 2023-04-14 DIAGNOSIS — M17.0 PRIMARY OSTEOARTHRITIS OF BOTH KNEES: ICD-10-CM

## 2023-04-14 PROBLEM — B00.9 HSV-2 (HERPES SIMPLEX VIRUS 2) INFECTION: Status: ACTIVE | Noted: 2023-04-14

## 2023-04-14 PROBLEM — B00.9 HSV-1 (HERPES SIMPLEX VIRUS 1) INFECTION: Status: ACTIVE | Noted: 2023-04-14

## 2023-04-14 PROBLEM — C44.91 BASAL CELL CARCINOMA: Status: RESOLVED | Noted: 2023-04-14 | Resolved: 2023-04-14

## 2023-04-14 PROCEDURE — 1036F TOBACCO NON-USER: CPT | Performed by: FAMILY MEDICINE

## 2023-04-14 PROCEDURE — 3044F HG A1C LEVEL LT 7.0%: CPT | Performed by: FAMILY MEDICINE

## 2023-04-14 PROCEDURE — 3078F DIAST BP <80 MM HG: CPT | Performed by: FAMILY MEDICINE

## 2023-04-14 PROCEDURE — 99214 OFFICE O/P EST MOD 30 MIN: CPT | Performed by: FAMILY MEDICINE

## 2023-04-14 PROCEDURE — 3074F SYST BP LT 130 MM HG: CPT | Performed by: FAMILY MEDICINE

## 2023-04-14 RX ORDER — SPIRONOLACTONE 25 MG/1
25 TABLET ORAL DAILY
COMMUNITY
End: 2023-09-25

## 2023-04-14 RX ORDER — DOXYCYCLINE 100 MG/1
100 CAPSULE ORAL DAILY
COMMUNITY

## 2023-04-14 RX ORDER — LEVOTHYROXINE SODIUM 25 UG/1
25 TABLET ORAL
COMMUNITY
End: 2023-06-22 | Stop reason: SDUPTHER

## 2023-04-14 RX ORDER — METFORMIN HYDROCHLORIDE 500 MG/1
500 TABLET ORAL
COMMUNITY
End: 2023-12-26

## 2023-04-14 RX ORDER — VALACYCLOVIR HYDROCHLORIDE 500 MG/1
1 TABLET, FILM COATED ORAL 2 TIMES DAILY
COMMUNITY
Start: 2023-01-16

## 2023-04-14 RX ORDER — BIOTIN 5 MG
TABLET ORAL
COMMUNITY

## 2023-04-14 NOTE — PROGRESS NOTES
"Subjective   Patient ID: Emelina Powell is a 62 y.o. female who presents for Diabetes, Hypertension, and Hyperlipidemia.  Covid vax: x 5  CRC: 10/2020  Mammogram: 4/2023 cat 4-had bx yesterday  Pap: 2017 and hpv(-)  Lmp: n/a  HPI  Patient Active Problem List   Diagnosis    Borderline hypothyroidism    Essential hypertension    Essential tremor    HSV-1 (herpes simplex virus 1) infection    HSV-2 (herpes simplex virus 2) infection    Mixed hyperlipidemia    Primary osteoarthritis of both knees    Rosacea    Type 2 diabetes mellitus without complication, without long-term current use of insulin (CMS/[facility])       Past Surgical History:   Procedure Laterality Date    COLONOSCOPY  10/2020    no polyps    ESOPHAGOGASTRODUODENOSCOPY  10/2020    OTHER SURGICAL HISTORY  2018    Mohs surgery x 3    TOTAL KNEE ARTHROPLASTY Right 06/2019    WISDOM TOOTH EXTRACTION  1979       Review of Systems  This patient has   NO history of recent Covid nor flu symptoms,  NO Fever nor chills,  NO Chest pain, shortness of breath nor paroxysmal nocturnal dyspnea,  NO Nausea, vomiting, nor diarrhea,  NO Hematochezia nor melena,  NO Dysuria, hematuria, nor new incontinence issues  NO new severe headaches nor neurological complaints,  NO new issues with anxiety nor depression nor new psychiatric complaints,  NO suicidal nor homicidal ideations.     OBJECTIVE:  /68   Pulse 76   Temp 36.3 °C (97.4 °F) (Temporal)   Resp 16   Ht 1.702 m (5' 7\")   Wt 101 kg (222 lb)   LMP  (LMP Unknown)   SpO2 97%   BMI 34.77 kg/m²      General:  alert, oriented, no acute distress.  No obvious skin rashes noted.   No gait disturbance noted.    Mood is pleasant, not tearful, no signs of emotional distress.  Not appearing intoxicated or altered.   No voiced delusions,   Normal, appropriate behavior.    HEENT: Normocephalic, atraumatic,   Pupils round, reactive to light  Extraocular motions intact and wnl  Tympanic membranes normal    Neck: no nuchal " rigidity  No masses palpable.  No carotid bruits.  No thyromegaly.    Respiratory: Equal breath sounds  No wheezes,    rales,    nor rhonchi  No respiratory distress.    Heart: Regular rate and rhythm, no    murmurs  no rubs/gallops    Abdomen: no masses palpable, nontender, no rebound nor guarding.    Extremities: NO cyanosis noted, no clubbing.   No edema noted.  2+dorsalis pedis pulses.    Normal-not antalgic, steady gait.    No visits with results within 3 Month(s) from this visit.   Latest known visit with results is:   [facility] Encounter on 01/13/2023   Component Date Value Ref Range Status    TSH 01/13/2023 2.39  0.44 - 3.98 mIU/L Final    Comment:  TSH testing is performed using different testing    methodology at [facility] than at other    [facility]. Direct result comparisons should    only be made within the same method.      VDRL 01/13/2023 REACTIVE (A)  NONREACTIVE Final    VDRL TITER 01/13/2023 1:1   Final    Syphilis Total Ab 01/13/2023 REACTIVE (A)  NONREACTIVE Final    Comment: Additional testing using RPR/VDRL and, if necessary, Treponema pallidum   particle agglutination (TP-PA) will be performed to discriminate between   treated syphilis, untreated syphilis, and falsely reactive treponemal   antibody results.      RPR 01/13/2023 SEE BELOW  NONREACTIVE Final    Comment:    RPR is substituted with VDRL due to a temporary reagent shortage as of   05/03/2020. See VDRL for results.      Herpes simplex virus I/II Antibody* 01/13/2023 0.68  <=0.89 IV Final    Comment: INTERPRETIVE INFORMATION: Herpes Simplex Virus Type 1 and/or 2   Antibodies, IgM by LATOYA    0.89 IV or Less .......... Not Detected    0.90 - 1.09 IV ........... Indeterminate- Repeat testing in                               10-14 days may be helpful.    1.10 IV or Greater ....... Detected-IgM antibody to HSV                               detected, which may indicate a                               current or recent  infection.                               However, low levels of IgM                               antibodies may occasionally                               persist for more than 12                               months post-infection.  Performed By: [facility]  [address] [place] [address]  : Drake Chavarria MD, PhD      Free T4 01/13/2023 1.17 (H)  0.61 - 1.12 ng/dL Final    Comment:  Thyroxine Free testing is performed using different testing    methodology at [facility] than at other    [facility]. Direct result comparisons should    only be made within the same method.  .   Biotin can cause falsely elevated free T4 results. Patients   taking a Biotin dose of up to 10 mg/day should refrain from   taking Biotin for 24 hours before sample collection. Patient   taking a Biotin dose of >10 mg/day should consult with their   physician or the laboratory before the blood draw.      Glucose 01/13/2023 89  74 - 99 mg/dL Final    Sodium 01/13/2023 141  136 - 145 mmol/L Final    Potassium 01/13/2023 4.5  3.5 - 5.3 mmol/L Final    Chloride 01/13/2023 105  98 - 107 mmol/L Final    Bicarbonate 01/13/2023 28  21 - 32 mmol/L Final    Anion Gap 01/13/2023 13  10 - 20 mmol/L Final    Urea Nitrogen 01/13/2023 20  6 - 23 mg/dL Final    Creatinine 01/13/2023 0.75  0.50 - 1.05 mg/dL Final    GFR Female 01/13/2023 90  >90 mL/min/1.73m2 Final    Comment:  CALCULATIONS OF ESTIMATED GFR ARE PERFORMED   USING THE 2021 CKD-EPI STUDY REFIT EQUATION   WITHOUT THE RACE VARIABLE FOR THE IDMS-TRACEABLE   CREATININE METHODS.    https://jasn.asnjournals.org/content/early/2021/09/22/ASN.7831574375      Calcium 01/13/2023 9.6  8.6 - 10.3 mg/dL Final    Albumin 01/13/2023 4.4  3.4 - 5.0 g/dL Final    Alkaline Phosphatase 01/13/2023 94  33 - 136 U/L Final    Total Protein 01/13/2023 7.2  6.4 - 8.2 g/dL Final    AST 01/13/2023 21  9 - 39 U/L Final    Total Bilirubin 01/13/2023 0.4  0.0 - 1.2  mg/dL Final    ALT (SGPT) 01/13/2023 17  7 - 45 U/L Final    Comment:  Patients treated with Sulfasalazine may generate    falsely decreased results for ALT.      Hep A IgM 01/13/2023 NONREACTIVE  NONREACTIVE Final    Comment:  Biotin interference may cause falsely decreased results.   Patients taking a Biotin dose of up to 5 mg/day should   refrain from taking Biotin for 24 hours before sample   collection. Providers may contact their local laboratory   for further information.      Hep B Core IgM 01/13/2023 NONREACTIVE  NONREACTIVE Final    Comment:  Results from patients taking biotin supplements or receiving   high-dose biotin therapy should be interpreted with caution   due to possible interference with this test. Providers may   contact their local laboratory for further information.      Hepatitis B Surface Ag 01/13/2023 NONREACTIVE  NONREACTIVE Final    Comment:  Biotin interference may cause falsely decreased results.   Patients taking a Biotin dose of up to 5 mg/day should   refrain from taking Biotin for 24 hours before sample   collection. Providers may contact their local laboratory   for further information.      Hepatitis C Ab 01/13/2023 NONREACTIVE  NONREACTIVE Final    Comment:  Results from patients taking biotin supplements or receiving   high-dose biotin therapy should be interpreted with caution   due to possible interference with this test. Providers may    contact their local laboratory for further information.      Hemoglobin A1C 01/13/2023 5.5  % Final    Comment:      Diagnosis of Diabetes-Adults   Non-Diabetic: < or = 5.6%   Increased risk for developing diabetes: 5.7-6.4%   Diagnostic of diabetes: > or = 6.5%  .       Monitoring of Diabetes                Age (y)     Therapeutic Goal (%)   Adults:          >18           <7.0   Pediatrics:    13-18           <7.5                   7-12           <8.0                   0- 6            7.5-8.5   American Diabetes Association. Diabetes Care  33(S1), Jan 2010.      Estimated Average Glucose 01/13/2023 111  MG/DL Final    WBC 01/13/2023 7.2  4.4 - 11.3 x10E9/L Final    nRBC 01/13/2023 0.0  0.0 - 0.0 /100 WBC Final    RBC 01/13/2023 4.83  4.00 - 5.20 x10E12/L Final    Hemoglobin 01/13/2023 12.8  12.0 - 16.0 g/dL Final    Hematocrit 01/13/2023 41.9  36.0 - 46.0 % Final    MCV 01/13/2023 87  80 - 100 fL Final    MCHC 01/13/2023 30.5 (L)  32.0 - 36.0 g/dL Final    Platelets 01/13/2023 288  150 - 450 x10E9/L Final    RDW 01/13/2023 14.3  11.5 - 14.5 % Final    HIV 1 and 2 Screen 01/13/2023 NONREACTIVE  NONREACTIVE Final    Comment:  HIV Ag/Ab screen is performed using the Siemens Simplebooklet   HIV Ag/Ab Combo assay which detects the presence of HIV    p24 antigen as well as antibodies to HIV-1   (Group M and O) and HIV-2.  .  No laboratory evidence of HIV infection. If acute HIV infection is   suspected, consider testing for HIV RNA by PCR (viral load).      HSV 1 IgG 01/13/2023 >8.0 (A)  INDEX Final    Comment: REF VALUES  NEGATIVE   <0.9  EQUIVOCAL  >=0.90  <=1.10  POSITIVE   >1.10      HSV 2 IgG 01/13/2023 4.8 (A)  INDEX Final    Comment: POTENTIAL FOR CROSS-REACTIVITY BETWEEN  HSV I AND HSV II EXISTS.  REF VALUES  NEGATIVE   <0.9  EQUIVOCAL  >=0.90  <=1.10  POSITIVE   >1.10          Assessment/Plan     Problem List Items Addressed This Visit          Nervous    Essential tremor       Circulatory    Essential hypertension    Relevant Orders    CBC and Auto Differential    Comprehensive Metabolic Panel    Lipid Panel       Musculoskeletal    Primary osteoarthritis of both knees       Endocrine/Metabolic    Borderline hypothyroidism    Relevant Orders    Thyroid Stimulating Hormone    Thyroxine, Free    Type 2 diabetes mellitus without complication, without long-term current use of insulin (CMS/[facility])    Relevant Orders    Hemoglobin A1C       Other    Mixed hyperlipidemia    Relevant Orders    Lipid Panel    Rosacea       Pap in 4-6mo  And again had a lengthy  discussion w pt  about risks of poorly controlled diabetes including micro and macrovascular complications of DM2 including blindness,MI,CVA and death among other possibilities. Pt aware and agrees to better compliance and adherance to instructions such as regular eye exams q 1-2 y, foot exams,and f/u regularly for hba1c with a goal of 6.5.    NO evidence of neuropathy or nephropathy at this point    Follow up as planned for hba1c and BP checks REGULARLY.  Bx pends

## 2023-04-18 ENCOUNTER — TELEPHONE (OUTPATIENT)
Dept: WOMENS IMAGING | Age: 63
End: 2023-04-18

## 2023-04-18 NOTE — TELEPHONE ENCOUNTER
04/18/23  Spoke with patient and reviewed her pathology report. Patient was offered earlier follow up appointment with Dr. Ozzy Cheatham but after discussion patient is keeping her appointment for May 1st at [address] at the [facility]. Patient has travel plans from April 23-29 and Dr. Ozzy Cheatham will not be in the office part of that week. Encouraged patient to call with any questions or concerns. Patient also had travel plans starting May 2nd for over month and Dr. Rola Candelario office made aware.

## 2023-05-01 ENCOUNTER — OFFICE VISIT (OUTPATIENT)
Dept: SURGERY | Age: 63
End: 2023-05-01
Payer: COMMERCIAL

## 2023-05-01 ENCOUNTER — HOSPITAL ENCOUNTER (OUTPATIENT)
Dept: LAB | Age: 63
End: 2023-05-01
Payer: COMMERCIAL

## 2023-05-01 ENCOUNTER — PREP FOR PROCEDURE (OUTPATIENT)
Dept: SURGERY | Age: 63
End: 2023-05-01

## 2023-05-01 ENCOUNTER — HOSPITAL ENCOUNTER (OUTPATIENT)
Dept: LAB | Age: 63
Setting detail: SPECIMEN
Discharge: HOME | End: 2023-05-01
Payer: COMMERCIAL

## 2023-05-01 VITALS
OXYGEN SATURATION: 97 % | RESPIRATION RATE: 14 BRPM | HEIGHT: 66 IN | DIASTOLIC BLOOD PRESSURE: 74 MMHG | WEIGHT: 228 LBS | TEMPERATURE: 97.4 F | SYSTOLIC BLOOD PRESSURE: 115 MMHG | BODY MASS INDEX: 36.64 KG/M2 | HEART RATE: 78 BPM

## 2023-05-01 DIAGNOSIS — E66.9 OBESITY, CLASS II, BMI 35-39.9: ICD-10-CM

## 2023-05-01 DIAGNOSIS — D05.11 DUCTAL CARCINOMA IN SITU (DCIS) OF RIGHT BREAST: Primary | ICD-10-CM

## 2023-05-01 DIAGNOSIS — Z85.3 PERSONAL HISTORY OF MALIGNANT NEOPLASM OF BREAST: ICD-10-CM

## 2023-05-01 PROBLEM — R09.81 NASAL CONGESTION: Status: ACTIVE | Noted: 2023-05-01

## 2023-05-01 PROBLEM — K29.70 GASTRITIS DUE TO HELICOBACTER SPECIES: Status: ACTIVE | Noted: 2023-05-01

## 2023-05-01 PROBLEM — B00.9 HSV-1 (HERPES SIMPLEX VIRUS 1) INFECTION: Status: ACTIVE | Noted: 2023-04-14

## 2023-05-01 PROBLEM — L02.01 ABSCESS OF FACE: Status: ACTIVE | Noted: 2023-05-01

## 2023-05-01 PROBLEM — M17.11 UNILATERAL PRIMARY OSTEOARTHRITIS, RIGHT KNEE: Status: ACTIVE | Noted: 2017-12-12

## 2023-05-01 PROBLEM — A53.9 SYPHILIS: Status: ACTIVE | Noted: 2023-05-01

## 2023-05-01 PROBLEM — S46.019A STRAIN OF ROTATOR CUFF CAPSULE: Status: ACTIVE | Noted: 2023-05-01

## 2023-05-01 PROBLEM — E11.9 TYPE 2 DIABETES MELLITUS WITHOUT COMPLICATION, WITHOUT LONG-TERM CURRENT USE OF INSULIN (HCC): Status: ACTIVE | Noted: 2022-06-13

## 2023-05-01 PROBLEM — E03.8 SUBCLINICAL HYPOTHYROIDISM: Status: ACTIVE | Noted: 2023-05-01

## 2023-05-01 PROBLEM — R11.0 NAUSEA: Status: ACTIVE | Noted: 2023-05-01

## 2023-05-01 PROBLEM — R25.1 TREMOR: Status: ACTIVE | Noted: 2023-05-01

## 2023-05-01 PROBLEM — M17.12 PRIMARY OSTEOARTHRITIS OF LEFT KNEE: Status: ACTIVE | Noted: 2022-03-25

## 2023-05-01 PROBLEM — G25.0 ESSENTIAL TREMOR: Status: ACTIVE | Noted: 2022-06-13

## 2023-05-01 PROBLEM — J00 NASOPHARYNGITIS: Status: ACTIVE | Noted: 2023-05-01

## 2023-05-01 PROBLEM — L65.9 ALOPECIA: Status: ACTIVE | Noted: 2023-05-01

## 2023-05-01 PROBLEM — M25.519 SHOULDER PAIN: Status: ACTIVE | Noted: 2023-05-01

## 2023-05-01 PROBLEM — E66.812 OBESITY, CLASS II, BMI 35-39.9: Status: ACTIVE | Noted: 2023-05-01

## 2023-05-01 PROBLEM — R53.83 FATIGUE: Status: ACTIVE | Noted: 2023-05-01

## 2023-05-01 PROBLEM — B96.81 GASTRITIS DUE TO HELICOBACTER SPECIES: Status: ACTIVE | Noted: 2023-05-01

## 2023-05-01 PROCEDURE — 3074F SYST BP LT 130 MM HG: CPT | Performed by: SURGERY

## 2023-05-01 PROCEDURE — 99204 OFFICE O/P NEW MOD 45 MIN: CPT | Performed by: SURGERY

## 2023-05-01 PROCEDURE — 36415 COLL VENOUS BLD VENIPUNCTURE: CPT

## 2023-05-01 PROCEDURE — 3078F DIAST BP <80 MM HG: CPT | Performed by: SURGERY

## 2023-05-01 RX ORDER — SODIUM CHLORIDE 9 MG/ML
INJECTION, SOLUTION INTRAVENOUS PRN
OUTPATIENT
Start: 2023-05-01

## 2023-05-01 RX ORDER — BIOTIN 5 MG
1 TABLET ORAL DAILY
COMMUNITY

## 2023-05-01 RX ORDER — LEVOTHYROXINE SODIUM 0.03 MG/1
25 TABLET ORAL
COMMUNITY
Start: 2021-09-20

## 2023-05-01 RX ORDER — SODIUM CHLORIDE 0.9 % (FLUSH) 0.9 %
5-40 SYRINGE (ML) INJECTION EVERY 12 HOURS SCHEDULED
OUTPATIENT
Start: 2023-05-01

## 2023-05-01 RX ORDER — SODIUM CHLORIDE 0.9 % (FLUSH) 0.9 %
5-40 SYRINGE (ML) INJECTION PRN
OUTPATIENT
Start: 2023-05-01

## 2023-05-01 RX ORDER — SPIRONOLACTONE 25 MG/1
25 TABLET ORAL DAILY
COMMUNITY
Start: 2023-03-28

## 2023-05-01 ASSESSMENT — ENCOUNTER SYMPTOMS
COLOR CHANGE: 0
COUGH: 0
CHEST TIGHTNESS: 0
ABDOMINAL PAIN: 0
VOMITING: 0
NAUSEA: 0
SHORTNESS OF BREATH: 0
SORE THROAT: 0

## 2023-05-01 NOTE — PROGRESS NOTES
also reviewed the note in detail. This note was partially generated using Dragon voice recognition system, and there may be some incorrect words, spellings, punctuation that were not noticed in checking the note before saving.

## 2023-05-01 NOTE — PATIENT INSTRUCTIONS
Patient Education        Learning About Breast Cancer Treatments  Your Care Instructions     Breast cancer means abnormal cells grow out of control in one or both breasts. These cancer cells can spread from the breast to nearby lymph nodes and other tissues. They can also spread to other parts of the body. The type and stage of cancer you have is based on:  Where in the breast the cancer started. The genetics of those cancer cells. How far cancer has spread within the breast, to nearby tissues, or to other organs. What the cancer cells look like under a microscope. Whether there is cancer in the nearby lymph nodes. Tests that help find the stage of your cancer can help choose the right treatment for you. These tests can include a breast biopsy, lymph node biopsy, blood tests, and X-rays. You may need other tests as well, such as a bone, CT, or MRI scan. Whether you have more tests depends on your symptoms and the stage of the cancer. When you find out that you have cancer, you may feel many emotions and may need some help coping. Seek out family, friends, and counselors for support. You also can do things at home to make yourself feel better while you go through treatment. Call the [facility] (4-541.863.6095) or visit its website at [address] [employer] for more information. How is breast cancer treated? Your doctor may combine treatments. This is a common way to treat breast cancer. Treatment depends on what type and stage of cancer you have. You may have:  Surgery to remove the cancer. Radiation. This uses high-dose X-rays to kill cancer cells and shrink tumors. Chemotherapy. This uses medicine to kill cancer cells. Hormone therapy. This uses medicines such as tamoxifen. It limits the effect of the hormone estrogen. This hormone can help some types of breast cancer cells to grow. Targeted therapy.  This uses medicines such as trastuzumab (Herceptin) to help keep cancer from growing or

## 2023-05-09 LAB
Lab: NORMAL
Lab: NORMAL

## 2023-05-10 ENCOUNTER — TELEPHONE (OUTPATIENT)
Dept: SURGERY | Age: 63
End: 2023-05-10

## 2023-05-10 NOTE — TELEPHONE ENCOUNTER
Patient was informed that her genetic test results are negative for a deleterious mutation. 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 The patient is aware to continue the current treatment / follow up plan as previously recommended by Dr. Jess Nichols. She will receive a copy of the test results in the mail. In the information packet, there is information to contact a genetic counselor. This is free of charge and Dr. Jess Nichols highly recommends all her patients to contact them. Please call the office at [phone] with any questions. The patient verbalized understanding of her results and has no questions at this time.

## 2023-05-10 NOTE — TELEPHONE ENCOUNTER
----- Message from Suzan Haywood MD sent at 5/10/2023 11:29 AM EDT -----  Regarding: call with [facility] JASON goodwin    ----- Message -----  From: Jennifer Christiansen Incoming Lab Results Anywhere [employer]  Sent: 5/9/2023   1:42 PM EDT  To: Suzan Haywood MD

## 2023-06-16 ENCOUNTER — HOSPITAL ENCOUNTER (OUTPATIENT)
Dept: PREADMISSION TESTING | Age: 63
Discharge: HOME | End: 2023-06-20
Payer: COMMERCIAL

## 2023-06-16 VITALS
RESPIRATION RATE: 16 BRPM | OXYGEN SATURATION: 98 % | HEIGHT: 67 IN | BODY MASS INDEX: 35.56 KG/M2 | WEIGHT: 226.6 LBS | DIASTOLIC BLOOD PRESSURE: 78 MMHG | HEART RATE: 64 BPM | SYSTOLIC BLOOD PRESSURE: 118 MMHG | TEMPERATURE: 97.8 F

## 2023-06-16 LAB
ANION GAP SERPL CALCULATED.3IONS-SCNC: 12 MEQ/L (ref 9–15)
BUN SERPL-MCNC: 14 MG/DL (ref 8–23)
CALCIUM SERPL-MCNC: 9.8 MG/DL (ref 8.5–9.9)
CHLORIDE SERPL-SCNC: 104 MEQ/L (ref 95–107)
CO2 SERPL-SCNC: 25 MEQ/L (ref 20–31)
CREAT SERPL-MCNC: 0.61 MG/DL (ref 0.5–0.9)
GLUCOSE SERPL-MCNC: 98 MG/DL (ref 70–99)
HBA1C MFR BLD: 5.8 % (ref 4.8–5.9)
POTASSIUM SERPL-SCNC: 4.3 MEQ/L (ref 3.4–4.9)
SODIUM SERPL-SCNC: 141 MEQ/L (ref 135–144)

## 2023-06-16 PROCEDURE — 83036 HEMOGLOBIN GLYCOSYLATED A1C: CPT

## 2023-06-16 PROCEDURE — 80048 BASIC METABOLIC PNL TOTAL CA: CPT

## 2023-06-16 ASSESSMENT — ENCOUNTER SYMPTOMS
SORE THROAT: 0
COUGH: 0
NAUSEA: 0
EYE ITCHING: 0
EYE PAIN: 0
DIARRHEA: 0
ABDOMINAL PAIN: 0
ABDOMINAL DISTENTION: 0
BACK PAIN: 0
CHEST TIGHTNESS: 0
EYE REDNESS: 0
CHOKING: 0
RHINORRHEA: 0
SINUS PRESSURE: 0
WHEEZING: 0
FACIAL SWELLING: 0
APNEA: 0
SINUS PAIN: 0
CONSTIPATION: 0
SHORTNESS OF BREATH: 0
VOMITING: 0
EYE DISCHARGE: 0
TROUBLE SWALLOWING: 0
PHOTOPHOBIA: 0

## 2023-06-20 ENCOUNTER — HOSPITAL ENCOUNTER (OUTPATIENT)
Dept: OCCUPATIONAL THERAPY | Age: 63
Setting detail: THERAPIES SERIES
Discharge: HOME | End: 2023-06-20
Payer: COMMERCIAL

## 2023-06-20 ENCOUNTER — SOCIAL WORK (OUTPATIENT)
Dept: RADIATION ONCOLOGY | Age: 63
End: 2023-06-20

## 2023-06-20 PROCEDURE — 97166 OT EVAL MOD COMPLEX 45 MIN: CPT

## 2023-06-20 NOTE — PROGRESS NOTES
SW met with pt as she was seen for pre-breast surgery education and support. She presented as pleasant and engaging, stating she is feeling prepared for her surgery and moving forward with treatment. Pt reports she is a retired . She is a native of [place], having moved to [place] during childhood, but also living in her home country for periods. She states she met her  when her family moved to [place], and he is a native of [place]. Following their marriage, the couple moved to [place], where her  has family. Pt states she has two adult sons, both of whom live locally. She also reports she has a fraternal twin sister. While English is pt's second language, she states that living in the [place] during childhood helped to solidify her English fluency. Pt states that she lives at home with her , who will transport her to and from surgery, and will assist with her care afterward. She also identifies family and additional community supports, upon whom she can depend as needed. Supportive services at the cancer center were discussed. Pt was provided with SW business card should she have any further question or need. Additionally, she was provided with resource material from the [employer] for additional support and education.

## 2023-06-20 NOTE — PROGRESS NOTES
Occupational Therapy Lymphedema Prehab Screen    Date: 2023  Patient Name: Roge Chung        MRN: [id number]  Account: [de-identified]   : 1960  (58 y.o.)    [x]   Patient's date of birth was confirmed    Chart Review:  Diagnosis: There were no encounter diagnoses. R lumpectomy  Past Medical History:   Diagnosis Date    Arthritis     Cancer ([facility]     BASAL CELL    Diabetes mellitus ([facility] [address] [facility]     Hayfever     Hyperlipidemia     Hypertension     Obesity     Rosacea     Thyroid disease     Vitamin D deficiency      Past Surgical History:   Procedure Laterality Date    COLONOSCOPY      negative-Lizet    ENDOSCOPY, COLON, DIAGNOSTIC      [facility]ANNA STEROTACTIC LOC BREAST BIOPSY RIGHT Right 2023    BRIANNA STEROTACTIC LOC BREAST BIOPSY RIGHT  [address] MOHS SURGERY  2008    x3    TOTAL KNEE ARTHROPLASTY Left 2022    TOTAL KNEE ARTHROPLASTY Right 2019       Strength:   WNL BUES    ROM:    WNL BUES    Observation:   NA    Patient is R hand dominant. Circumferential Measurements    Location Rt UE (cm)   Date: 2023 Lt UE (cm)   Date:   2023   3rd DIP/ PIP 5/5.5 4.6/5.5   10 cm from distal 3rd finger 22 23   15 cm 22.5 22   20 cm 16.5 17   30 cm 26 27   40 cm 32.5 32   50 cm 42 43   60 cm 43 45                Brief Fatigue Inventory   Throughout our lives, most of us have times when we feel very tired or fatigued. Have you felt unusually tired or fatigued in the last week? No   Scale: 0 (No Fatigue)  <<<<>>>>  10 (As Bad as You Can Imagine)   1. How would you rate your fatigue (weariness, tiredness) right NOW? 0   2. How would you rate your USUAL level of fatigue during the past 24 hours? 0   3. How would you rate your WORST level of fatigue during the past 24 hours? 0   Scale: 0 (Does Not Interfere) <<<<>>>> 10 (Completely Interferes)   4. How would you rate how your fatigue has interfered with your GENERAL ACTIVITY in the past 24 hours? 0   5.  How would you rate how your fatigue has

## 2023-06-22 ENCOUNTER — ANESTHESIA (OUTPATIENT)
Dept: OPERATING ROOM | Age: 63
End: 2023-06-22
Payer: COMMERCIAL

## 2023-06-22 ENCOUNTER — HOSPITAL ENCOUNTER (OUTPATIENT)
Dept: WOMENS IMAGING | Age: 63
Discharge: HOME | End: 2023-06-24
Attending: SURGERY
Payer: COMMERCIAL

## 2023-06-22 ENCOUNTER — ANESTHESIA EVENT (OUTPATIENT)
Dept: OPERATING ROOM | Age: 63
End: 2023-06-22
Payer: COMMERCIAL

## 2023-06-22 ENCOUNTER — HOSPITAL ENCOUNTER (OUTPATIENT)
Age: 63
Setting detail: OUTPATIENT SURGERY
Discharge: HOME | End: 2023-06-22
Attending: SURGERY | Admitting: SURGERY
Payer: COMMERCIAL

## 2023-06-22 VITALS — SYSTOLIC BLOOD PRESSURE: 116 MMHG | HEART RATE: 76 BPM | RESPIRATION RATE: 20 BRPM | DIASTOLIC BLOOD PRESSURE: 72 MMHG

## 2023-06-22 VITALS
WEIGHT: 226 LBS | RESPIRATION RATE: 16 BRPM | TEMPERATURE: 44.8 F | SYSTOLIC BLOOD PRESSURE: 112 MMHG | HEIGHT: 67 IN | HEART RATE: 55 BPM | OXYGEN SATURATION: 97 % | DIASTOLIC BLOOD PRESSURE: 64 MMHG | BODY MASS INDEX: 35.47 KG/M2

## 2023-06-22 DIAGNOSIS — Z85.3 PERSONAL HISTORY OF MALIGNANT NEOPLASM OF BREAST: ICD-10-CM

## 2023-06-22 DIAGNOSIS — E03.9 HYPOTHYROIDISM, UNSPECIFIED: ICD-10-CM

## 2023-06-22 DIAGNOSIS — D05.11 BREAST NEOPLASM, TIS (DCIS), RIGHT: ICD-10-CM

## 2023-06-22 DIAGNOSIS — D05.11 DUCTAL CARCINOMA IN SITU (DCIS) OF RIGHT BREAST: ICD-10-CM

## 2023-06-22 LAB
GLUCOSE BLD-MCNC: 103 MG/DL (ref 70–99)
GLUCOSE BLD-MCNC: 91 MG/DL (ref 70–99)
PERFORMED ON: ABNORMAL
PERFORMED ON: NORMAL

## 2023-06-22 PROCEDURE — 7100000001 HC PACU RECOVERY - ADDTL 15 MIN: Performed by: SURGERY

## 2023-06-22 PROCEDURE — 6360000002 HC RX W HCPCS: Performed by: STUDENT IN AN ORGANIZED HEALTH CARE EDUCATION/TRAINING PROGRAM

## 2023-06-22 PROCEDURE — 2580000003 HC RX 258: Performed by: SURGERY

## 2023-06-22 PROCEDURE — 6370000000 HC RX 637 (ALT 250 FOR IP): Performed by: STUDENT IN AN ORGANIZED HEALTH CARE EDUCATION/TRAINING PROGRAM

## 2023-06-22 PROCEDURE — 19125 EXCISION BREAST LESION: CPT | Performed by: SURGERY

## 2023-06-22 PROCEDURE — 7100000000 HC PACU RECOVERY - FIRST 15 MIN: Performed by: SURGERY

## 2023-06-22 PROCEDURE — 3700000001 HC ADD 15 MINUTES (ANESTHESIA): Performed by: SURGERY

## 2023-06-22 PROCEDURE — 6360000002 HC RX W HCPCS: Performed by: SURGERY

## 2023-06-22 PROCEDURE — 3700000000 HC ANESTHESIA ATTENDED CARE: Performed by: SURGERY

## 2023-06-22 PROCEDURE — 19301 PARTIAL MASTECTOMY: CPT | Performed by: SURGERY

## 2023-06-22 PROCEDURE — 3600000004 HC SURGERY LEVEL 4 BASE: Performed by: SURGERY

## 2023-06-22 PROCEDURE — 3600000014 HC SURGERY LEVEL 4 ADDTL 15MIN: Performed by: SURGERY

## 2023-06-22 PROCEDURE — 2500000003 HC RX 250 WO HCPCS: Performed by: SURGERY

## 2023-06-22 PROCEDURE — 7100000011 HC PHASE II RECOVERY - ADDTL 15 MIN: Performed by: SURGERY

## 2023-06-22 PROCEDURE — 88307 TISSUE EXAM BY PATHOLOGIST: CPT

## 2023-06-22 PROCEDURE — 2709999900 HC NON-CHARGEABLE SUPPLY: Performed by: SURGERY

## 2023-06-22 PROCEDURE — 2580000003 HC RX 258: Performed by: STUDENT IN AN ORGANIZED HEALTH CARE EDUCATION/TRAINING PROGRAM

## 2023-06-22 PROCEDURE — 76098 X-RAY EXAM SURGICAL SPECIMEN: CPT

## 2023-06-22 PROCEDURE — 88305 TISSUE EXAM BY PATHOLOGIST: CPT

## 2023-06-22 PROCEDURE — 7100000010 HC PHASE II RECOVERY - FIRST 15 MIN: Performed by: SURGERY

## 2023-06-22 PROCEDURE — 2500000003 HC RX 250 WO HCPCS: Performed by: RADIOLOGY

## 2023-06-22 PROCEDURE — 76998 US GUIDE INTRAOP: CPT | Performed by: SURGERY

## 2023-06-22 PROCEDURE — 19281 PERQ DEVICE BREAST 1ST IMAG: CPT

## 2023-06-22 RX ORDER — SODIUM CHLORIDE 9 MG/ML
INJECTION, SOLUTION INTRAVENOUS PRN
Status: DISCONTINUED | OUTPATIENT
Start: 2023-06-22 | End: 2023-06-22 | Stop reason: HOSPADM

## 2023-06-22 RX ORDER — PROPOFOL 10 MG/ML
INJECTION, EMULSION INTRAVENOUS PRN
Status: DISCONTINUED | OUTPATIENT
Start: 2023-06-22 | End: 2023-06-22 | Stop reason: SDUPTHER

## 2023-06-22 RX ORDER — SODIUM CHLORIDE 0.9 % (FLUSH) 0.9 %
5-40 SYRINGE (ML) INJECTION EVERY 12 HOURS SCHEDULED
Status: DISCONTINUED | OUTPATIENT
Start: 2023-06-22 | End: 2023-06-22 | Stop reason: HOSPADM

## 2023-06-22 RX ORDER — MIDAZOLAM HYDROCHLORIDE 1 MG/ML
INJECTION INTRAMUSCULAR; INTRAVENOUS PRN
Status: DISCONTINUED | OUTPATIENT
Start: 2023-06-22 | End: 2023-06-22 | Stop reason: SDUPTHER

## 2023-06-22 RX ORDER — HYDRALAZINE HYDROCHLORIDE 20 MG/ML
10 INJECTION INTRAMUSCULAR; INTRAVENOUS
Status: DISCONTINUED | OUTPATIENT
Start: 2023-06-22 | End: 2023-06-22 | Stop reason: HOSPADM

## 2023-06-22 RX ORDER — SODIUM CHLORIDE 0.9 % (FLUSH) 0.9 %
5-40 SYRINGE (ML) INJECTION PRN
Status: DISCONTINUED | OUTPATIENT
Start: 2023-06-22 | End: 2023-06-22 | Stop reason: HOSPADM

## 2023-06-22 RX ORDER — SODIUM CHLORIDE, SODIUM LACTATE, POTASSIUM CHLORIDE, CALCIUM CHLORIDE 600; 310; 30; 20 MG/100ML; MG/100ML; MG/100ML; MG/100ML
INJECTION, SOLUTION INTRAVENOUS CONTINUOUS
Status: DISCONTINUED | OUTPATIENT
Start: 2023-06-22 | End: 2023-06-22 | Stop reason: HOSPADM

## 2023-06-22 RX ORDER — LIDOCAINE HYDROCHLORIDE 10 MG/ML
INJECTION, SOLUTION EPIDURAL; INFILTRATION; INTRACAUDAL; PERINEURAL PRN
Status: DISCONTINUED | OUTPATIENT
Start: 2023-06-22 | End: 2023-06-22 | Stop reason: HOSPADM

## 2023-06-22 RX ORDER — LIDOCAINE HYDROCHLORIDE 20 MG/ML
INJECTION, SOLUTION INTRAVENOUS PRN
Status: DISCONTINUED | OUTPATIENT
Start: 2023-06-22 | End: 2023-06-22 | Stop reason: SDUPTHER

## 2023-06-22 RX ORDER — MEPERIDINE HYDROCHLORIDE 25 MG/ML
12.5 INJECTION INTRAMUSCULAR; INTRAVENOUS; SUBCUTANEOUS EVERY 5 MIN PRN
Status: DISCONTINUED | OUTPATIENT
Start: 2023-06-22 | End: 2023-06-22 | Stop reason: HOSPADM

## 2023-06-22 RX ORDER — MAGNESIUM HYDROXIDE 1200 MG/15ML
LIQUID ORAL PRN
Status: DISCONTINUED | OUTPATIENT
Start: 2023-06-22 | End: 2023-06-22 | Stop reason: HOSPADM

## 2023-06-22 RX ORDER — LABETALOL HYDROCHLORIDE 5 MG/ML
10 INJECTION, SOLUTION INTRAVENOUS
Status: DISCONTINUED | OUTPATIENT
Start: 2023-06-22 | End: 2023-06-22 | Stop reason: HOSPADM

## 2023-06-22 RX ORDER — CEFAZOLIN SODIUM IN 0.9 % NACL 2 G/100 ML
2000 PLASTIC BAG, INJECTION (ML) INTRAVENOUS
Status: COMPLETED | OUTPATIENT
Start: 2023-06-22 | End: 2023-06-22

## 2023-06-22 RX ORDER — FENTANYL CITRATE 0.05 MG/ML
50 INJECTION, SOLUTION INTRAMUSCULAR; INTRAVENOUS EVERY 5 MIN PRN
Status: DISCONTINUED | OUTPATIENT
Start: 2023-06-22 | End: 2023-06-22 | Stop reason: HOSPADM

## 2023-06-22 RX ORDER — OXYCODONE HYDROCHLORIDE 5 MG/1
5 CAPSULE ORAL PRN
Status: DISCONTINUED | OUTPATIENT
Start: 2023-06-22 | End: 2023-06-22 | Stop reason: HOSPADM

## 2023-06-22 RX ORDER — FENTANYL CITRATE 0.05 MG/ML
25 INJECTION, SOLUTION INTRAMUSCULAR; INTRAVENOUS EVERY 5 MIN PRN
Status: DISCONTINUED | OUTPATIENT
Start: 2023-06-22 | End: 2023-06-22 | Stop reason: HOSPADM

## 2023-06-22 RX ORDER — ONDANSETRON 2 MG/ML
4 INJECTION INTRAMUSCULAR; INTRAVENOUS
Status: DISCONTINUED | OUTPATIENT
Start: 2023-06-22 | End: 2023-06-22 | Stop reason: HOSPADM

## 2023-06-22 RX ORDER — OXYCODONE HYDROCHLORIDE 5 MG/1
10 CAPSULE ORAL PRN
Status: DISCONTINUED | OUTPATIENT
Start: 2023-06-22 | End: 2023-06-22 | Stop reason: HOSPADM

## 2023-06-22 RX ORDER — LEVOTHYROXINE SODIUM 25 UG/1
TABLET ORAL
Qty: 90 TABLET | Refills: 1 | Status: SHIPPED | OUTPATIENT
Start: 2023-06-22 | End: 2023-12-26

## 2023-06-22 RX ORDER — LIDOCAINE HYDROCHLORIDE 20 MG/ML
20 INJECTION, SOLUTION INFILTRATION; PERINEURAL ONCE
Status: COMPLETED | OUTPATIENT
Start: 2023-06-22 | End: 2023-06-22

## 2023-06-22 RX ORDER — SCOLOPAMINE TRANSDERMAL SYSTEM 1 MG/1
1 PATCH, EXTENDED RELEASE TRANSDERMAL
Status: DISCONTINUED | OUTPATIENT
Start: 2023-06-22 | End: 2023-06-22 | Stop reason: HOSPADM

## 2023-06-22 RX ORDER — ONDANSETRON 2 MG/ML
INJECTION INTRAMUSCULAR; INTRAVENOUS PRN
Status: DISCONTINUED | OUTPATIENT
Start: 2023-06-22 | End: 2023-06-22 | Stop reason: SDUPTHER

## 2023-06-22 RX ORDER — FENTANYL CITRATE 50 UG/ML
INJECTION, SOLUTION INTRAMUSCULAR; INTRAVENOUS PRN
Status: DISCONTINUED | OUTPATIENT
Start: 2023-06-22 | End: 2023-06-22 | Stop reason: SDUPTHER

## 2023-06-22 RX ORDER — PROCHLORPERAZINE EDISYLATE 5 MG/ML
5 INJECTION INTRAMUSCULAR; INTRAVENOUS
Status: DISCONTINUED | OUTPATIENT
Start: 2023-06-22 | End: 2023-06-22 | Stop reason: HOSPADM

## 2023-06-22 RX ORDER — DIPHENHYDRAMINE HYDROCHLORIDE 50 MG/ML
12.5 INJECTION INTRAMUSCULAR; INTRAVENOUS
Status: DISCONTINUED | OUTPATIENT
Start: 2023-06-22 | End: 2023-06-22 | Stop reason: HOSPADM

## 2023-06-22 RX ORDER — DEXAMETHASONE SODIUM PHOSPHATE 4 MG/ML
INJECTION, SOLUTION INTRA-ARTICULAR; INTRALESIONAL; INTRAMUSCULAR; INTRAVENOUS; SOFT TISSUE PRN
Status: DISCONTINUED | OUTPATIENT
Start: 2023-06-22 | End: 2023-06-22 | Stop reason: SDUPTHER

## 2023-06-22 RX ORDER — KETOROLAC TROMETHAMINE 30 MG/ML
INJECTION, SOLUTION INTRAMUSCULAR; INTRAVENOUS PRN
Status: DISCONTINUED | OUTPATIENT
Start: 2023-06-22 | End: 2023-06-22 | Stop reason: SDUPTHER

## 2023-06-22 RX ORDER — BUPIVACAINE HYDROCHLORIDE 2.5 MG/ML
INJECTION, SOLUTION EPIDURAL; INFILTRATION; INTRACAUDAL PRN
Status: DISCONTINUED | OUTPATIENT
Start: 2023-06-22 | End: 2023-06-22 | Stop reason: HOSPADM

## 2023-06-22 RX ADMIN — Medication 2000 MG: at 08:45

## 2023-06-22 RX ADMIN — KETOROLAC TROMETHAMINE 30 MG: 30 INJECTION, SOLUTION INTRAMUSCULAR; INTRAVENOUS at 09:44

## 2023-06-22 RX ADMIN — FENTANYL CITRATE 25 MCG: 50 INJECTION, SOLUTION INTRAMUSCULAR; INTRAVENOUS at 09:30

## 2023-06-22 RX ADMIN — DEXAMETHASONE SODIUM PHOSPHATE 4 MG: 4 INJECTION, SOLUTION INTRAMUSCULAR; INTRAVENOUS at 08:42

## 2023-06-22 RX ADMIN — ONDANSETRON 4 MG: 2 INJECTION INTRAMUSCULAR; INTRAVENOUS at 09:30

## 2023-06-22 RX ADMIN — MIDAZOLAM HYDROCHLORIDE 2 MG: 1 INJECTION, SOLUTION INTRAMUSCULAR; INTRAVENOUS at 08:30

## 2023-06-22 RX ADMIN — LIDOCAINE HYDROCHLORIDE 4 ML: 20 INJECTION, SOLUTION INFILTRATION; PERINEURAL at 07:14

## 2023-06-22 RX ADMIN — PROPOFOL 110 MG: 10 INJECTION, EMULSION INTRAVENOUS at 08:42

## 2023-06-22 RX ADMIN — FENTANYL CITRATE 25 MCG: 50 INJECTION, SOLUTION INTRAMUSCULAR; INTRAVENOUS at 09:05

## 2023-06-22 RX ADMIN — PROPOFOL 20 MG: 10 INJECTION, EMULSION INTRAVENOUS at 09:05

## 2023-06-22 RX ADMIN — FENTANYL CITRATE 25 MCG: 50 INJECTION, SOLUTION INTRAMUSCULAR; INTRAVENOUS at 08:48

## 2023-06-22 RX ADMIN — LIDOCAINE HYDROCHLORIDE 100 MG: 20 INJECTION, SOLUTION INTRAVENOUS at 08:42

## 2023-06-22 RX ADMIN — SODIUM CHLORIDE, POTASSIUM CHLORIDE, SODIUM LACTATE AND CALCIUM CHLORIDE: 600; 310; 30; 20 INJECTION, SOLUTION INTRAVENOUS at 08:30

## 2023-06-22 ASSESSMENT — PAIN DESCRIPTION - ORIENTATION
ORIENTATION: RIGHT
ORIENTATION: RIGHT

## 2023-06-22 ASSESSMENT — PAIN SCALES - GENERAL
PAINLEVEL_OUTOF10: 0

## 2023-06-22 ASSESSMENT — PAIN - FUNCTIONAL ASSESSMENT: PAIN_FUNCTIONAL_ASSESSMENT: 0-10

## 2023-06-22 ASSESSMENT — PAIN DESCRIPTION - LOCATION
LOCATION: BREAST
LOCATION: BREAST

## 2023-06-22 NOTE — ANESTHESIA POSTPROCEDURE EVALUATION
Department of Anesthesiology  Postprocedure Note    Patient: Mónica Leonard  MRN: [id number]  YOB: 1960  Date of evaluation: 6/22/2023      Procedure Summary     Date: 06/22/23 Room / Location: [address] [place] [address]    Anesthesia Start: 0830 Anesthesia Stop: 4297    Procedure: RIGHT BREAST NEEDLE LOCALIZED LUMPECTOMY (Breast) Diagnosis:       Breast neoplasm, Tis (DCIS), right      Personal history of malignant neoplasm of breast      (Breast neoplasm, Tis (DCIS), right [D05.11])      (Personal history of malignant neoplasm of breast [Z85.3])    Surgeons: Lennox Reichmann, MD Responsible Provider: Anum Frances MD    Anesthesia Type: general ASA Status: 2          Anesthesia Type: No value filed.     Mario Phase I: Mario Score: 10    Mario Phase II:        Anesthesia Post Evaluation    Patient location during evaluation: bedside  Patient participation: complete - patient participated  Level of consciousness: awake and sleepy but conscious  Airway patency: patent  Nausea & Vomiting: no nausea and no vomiting  Complications: no  Cardiovascular status: blood pressure returned to baseline and hemodynamically stable  Respiratory status: acceptable  Hydration status: euvolemic

## 2023-06-22 NOTE — ANESTHESIA PRE PROCEDURE
Department of Anesthesiology  Preprocedure Note       Name:  Johanna Alejandra   Age:  58 y.o.  :  1960                                          MRN:  [id number]         Date:  2023      Surgeon: Zachery Posada):  Elda Ahmadi MD    Procedure: Procedure(s):  RIGHT BREAST NEEDLE LOCALIZED LUMPECTOMY ([facility] @ [phone])    Medications prior to admission:   Prior to Admission medications    Medication Sig Start Date End Date Taking? Authorizing Provider   spironolactone (ALDACTONE) 25 MG tablet Take 1 tablet by mouth daily 3/28/23   Historical Provider, MD   levothyroxine (SYNTHROID) 25 MCG tablet Take 1 tablet by mouth every morning (before breakfast) 21   Historical Provider, MD   empagliflozin (JARDIANCE) 10 MG tablet Take 1 tablet by mouth daily 21   Historical Provider, MD   biotin (VITAMIN B7) 5 MG TABS tablet Take 1 tablet by mouth daily    Historical Provider, MD   atorvastatin (LIPITOR) 20 MG tablet Take 1 tablet by mouth daily 10/12/18   Pee Bar MD   metFORMIN (GLUCOPHAGE) 500 MG tablet Take 1 tablet by mouth nightly 10/12/18   Pee Bar MD   doxycycline hyclate (VIBRAMYCIN) 100 MG capsule TAKE ONE CAPSULE BY MOUTH TWICE DAILY 18   Historical Provider, MD   Calcium Carbonate-Vit D-Min 0711-3616 MG-UNIT CHEW Take 1 tablet by mouth daily.  14   Pee Bar MD       Current medications:    Current Facility-Administered Medications   Medication Dose Route Frequency Provider Last Rate Last Admin    lactated ringers IV soln infusion   IntraVENous Continuous Latasha Frederick MD        sodium chloride flush 0.9 % injection 5-40 mL  5-40 mL IntraVENous 2 times per day Elda Ahmadi MD        sodium chloride flush 0.9 % injection 5-40 mL  5-40 mL IntraVENous PRN Elda Ahmadi MD        0.9 % sodium chloride infusion   IntraVENous PRN Elda Ahmadi MD        ceFAZolin (ANCEF) 2000 mg in 0.9% sodium chloride 100 mL IVPB  2,000 mg IntraVENous On Call to Radha Nogueira MD

## 2023-06-22 NOTE — SEDATION DOCUMENTATION
5674 Patient arrived to [facility] via wheelchair from short stay surgery. 9608 Reviewed procedure with patient and patient verbalizes understanding. VS taken. Consent signed. Reviewed history, medications, and allergies. 6203 Patient assisted into mamm room and into needle loc chair. Tech taking films. 7919 Dr. Bita Burgess arrived to [facility] talking with patient and looking at films. Area of concern located at F 1/2 and 2 and cleaned with chlorparep x 3. He then injected lido 2% into site. Then he inserted [place] BLN 20ga x 7.5cm lot [id number] exp 02/23/27. Patient tolerated well. Film taken. Cup and paper tape over end of Rich[place] needle. Patient assisted out of needle loc chair and into wheelchair. Steady gait. 7314 Patient assisted back to short stay surgery via wheelchair.

## 2023-06-22 NOTE — H&P
UPDATED HISTORY AND PHYSICAL EXAMINATION    SERVICE DATE:  6/22/2023   SERVICE TIME:  7:23 AM    PHYSICAL EXAM MUST BE COMPLETED ON ADMISSION    The History and Physical (completed in the past 30 days) has been reviewed and the patient has been examined. The contents accurately reflect the patient's condition with the following additions or revisions since the H&P was completed. Examination indicates no changes. This H&P can be found in the Epic. The patient was counseled at length about the risks of noel Covid-19 during their perioperative period and any recovery window from their procedure. The patient was made aware that noel Covid-19  may worsen their prognosis for recovering from their procedure  and lend to a higher morbidity and/or mortality risk. All material risks, benefits, and reasonable alternatives including postponing the procedure were discussed. The patient does wish to proceed with the procedure at this time.     SIGNATURE: Sophie Garcia MD PATIENT NAME: Lois Martin   DATE: 6/22/23 MRN: [id number]   TIME: 7:23 AM EDT PHONE: [phone]

## 2023-06-22 NOTE — OP NOTE
OPERATIVE REPORT    LOG ID: [id number]  Surgery Date: 6/22/2023   Incision/Procedure Start Time: 1503  Incision Close/Procedure End Time: 0114    Procedure Performed: Procedure(s):  RIGHT BREAST NEEDLE LOCALIZED LUMPECTOMY     Surgeon(s)/Proceduralist(s) and Assistant(s): Bryce Romero MD   First Assistant: rGeg Merino  Scrub Person First: Jeyson Dutton PA-C      Anesthesia: Anesthesiologist: Zuleyma Perez MD   General     Pre-Operative Diagnosis: Breast neoplasm, Tis (DCIS), right [D05.11]  Personal history of malignant neoplasm of breast [Z85.3]   Post-Operative Diagnosis: same       ESTIMATED BLOOD LOSS: Minimal, 1 cc. COMPLICATIONS: No complications. SPECIMEN: right breast mass, Superior, medial, inferior, lateral, posterior margins      FINDINGS: clip in specimen     DRAINS: No drains were placed. PREOPERATIVE ANTIBIOTICS: ANCEF 2 GRAMS     Cancer Staging   Ductal carcinoma in situ (DCIS) of right breast  Staging form: Breast, AJCC 8th Edition  - Clinical: Stage 0 (cTis (DCIS), cN0, cM0, G2, ER+, CO+) - Signed by Mary Nogueira MD on 5/1/2023        INDICATIONS:  Danyel España is a 58 y.o.  female who presented with a right breast imaging abnormality. She underwent a minimally invasive biopsy that showed DCIS. I recommended needle localized lumpectomy. She understood the risks and benefits of procedure and consented on the day of surgery. PROCEDURE: Patient was brought to Radiology for a needle localization of the clip. The right breast, chest and axilla were prepped and draped in usual surgical fashion. Time out was performed and breast images were reviewed preoperatively. A aileen was made near the wire and it was infiltrated with 10 cc of 1% lidocaine. A 15 scalpel was used through the skin and subcutaneous tissue. Electrocautery was used for further dissection. A superior, medial, inferior, lateral, and posterior flap was created around the wire and the mass was removed.

## 2023-06-22 NOTE — DISCHARGE INSTRUCTIONS
Shower in 48 hours. Resume home meds. May drive in 1 week. Keep bra for 24 hours. Remove if uncomfortable. May take Motrin after 24 hours    Lumpectomy: What to Expect at [facility]     Breast-conserving surgery (lumpectomy) removes the cancer and just enough tissue to get all the cancer. For 1 or 2 days after the surgery, you will probably feel tired and have some pain. The skin around the cut (incision) may feel firm, swollen, and tender, and be bruised. Tenderness should go away in about 2 or 3 days, and the bruising within 2 weeks. Firmness and swelling may last for 3 to 6 months. You may feel a soft lump in your breast that gradually turns hard. This is the incision healing. It is not cancer. Women should wear a well-fitted and supportive bra, even during the night, for 1 week. You will probably be able to go back to work or your normal routine in 1 to 3 weeks after the surgery. This may depend on whether you have more treatment. Your doctor may have removed some lymph nodes in your armpit to see if the cancer has spread. If so, you may feel either numbness or tingling (\"pins and needles\") in your armpit or on the inside of your upper arm. This should improve over the next several weeks. Some people have numbness for a longer time. When you find out that you have cancer, you may feel many emotions and may need some help coping. Seek out family, friends, and counselors for support. You also can do things at home to make yourself feel better while you go through treatment. Call the [facility] (9-899.764.2679) or visit its website at [address] [employer] for more information. This care sheet gives you a general idea about how long it will take for you to recover. But each person recovers at a different pace. Follow the steps below to get better as quickly as possible. How can you care for yourself at home? Activity    Rest when you feel tired. Getting enough sleep will help you recover.

## 2023-06-29 ENCOUNTER — TELEPHONE (OUTPATIENT)
Dept: SURGERY | Age: 63
End: 2023-06-29

## 2023-06-29 DIAGNOSIS — N64.9 BREAST DISORDER: Primary | ICD-10-CM

## 2023-07-05 ENCOUNTER — OFFICE VISIT (OUTPATIENT)
Dept: SURGERY | Age: 63
End: 2023-07-05
Payer: COMMERCIAL

## 2023-07-05 VITALS
WEIGHT: 225.6 LBS | TEMPERATURE: 97.6 F | HEIGHT: 67 IN | DIASTOLIC BLOOD PRESSURE: 82 MMHG | RESPIRATION RATE: 12 BRPM | HEART RATE: 88 BPM | OXYGEN SATURATION: 98 % | SYSTOLIC BLOOD PRESSURE: 118 MMHG | BODY MASS INDEX: 35.41 KG/M2

## 2023-07-05 DIAGNOSIS — D05.11 DUCTAL CARCINOMA IN SITU (DCIS) OF RIGHT BREAST: ICD-10-CM

## 2023-07-05 DIAGNOSIS — N64.9 BREAST DISORDER: Primary | ICD-10-CM

## 2023-07-05 PROCEDURE — 99024 POSTOP FOLLOW-UP VISIT: CPT | Performed by: SURGERY

## 2023-07-05 PROCEDURE — 76642 ULTRASOUND BREAST LIMITED: CPT | Performed by: SURGERY

## 2023-07-05 RX ORDER — LIDOCAINE HYDROCHLORIDE AND EPINEPHRINE BITARTRATE 20; .01 MG/ML; MG/ML
2 INJECTION, SOLUTION SUBCUTANEOUS ONCE
Status: COMPLETED | OUTPATIENT
Start: 2023-07-05 | End: 2023-07-05

## 2023-07-05 RX ADMIN — LIDOCAINE HYDROCHLORIDE AND EPINEPHRINE BITARTRATE 2 ML: 20; .01 INJECTION, SOLUTION SUBCUTANEOUS at 09:36

## 2023-07-05 RX ADMIN — Medication 1 MEQ: at 09:36

## 2023-07-06 ENCOUNTER — LAB (OUTPATIENT)
Dept: LAB | Facility: LAB | Age: 63
End: 2023-07-06
Payer: COMMERCIAL

## 2023-07-06 DIAGNOSIS — E03.9 BORDERLINE HYPOTHYROIDISM: ICD-10-CM

## 2023-07-06 DIAGNOSIS — E11.9 TYPE 2 DIABETES MELLITUS WITHOUT COMPLICATION, WITHOUT LONG-TERM CURRENT USE OF INSULIN (MULTI): ICD-10-CM

## 2023-07-06 DIAGNOSIS — E78.2 MIXED HYPERLIPIDEMIA: ICD-10-CM

## 2023-07-06 DIAGNOSIS — I10 ESSENTIAL HYPERTENSION: ICD-10-CM

## 2023-07-06 DIAGNOSIS — A53.9 SYPHILIS: ICD-10-CM

## 2023-07-06 LAB
ALANINE AMINOTRANSFERASE (SGPT) (U/L) IN SER/PLAS: 18 U/L (ref 7–45)
ALBUMIN (G/DL) IN SER/PLAS: 4.3 G/DL (ref 3.4–5)
ALKALINE PHOSPHATASE (U/L) IN SER/PLAS: 114 U/L (ref 33–136)
ANION GAP IN SER/PLAS: 14 MMOL/L (ref 10–20)
ASPARTATE AMINOTRANSFERASE (SGOT) (U/L) IN SER/PLAS: 21 U/L (ref 9–39)
BASOPHILS (10*3/UL) IN BLOOD BY AUTOMATED COUNT: 0.05 X10E9/L (ref 0–0.1)
BASOPHILS/100 LEUKOCYTES IN BLOOD BY AUTOMATED COUNT: 0.6 % (ref 0–2)
BILIRUBIN TOTAL (MG/DL) IN SER/PLAS: 0.6 MG/DL (ref 0–1.2)
CALCIUM (MG/DL) IN SER/PLAS: 9.6 MG/DL (ref 8.6–10.3)
CARBON DIOXIDE, TOTAL (MMOL/L) IN SER/PLAS: 26 MMOL/L (ref 21–32)
CHLORIDE (MMOL/L) IN SER/PLAS: 103 MMOL/L (ref 98–107)
CHOLESTEROL (MG/DL) IN SER/PLAS: 155 MG/DL (ref 0–199)
CHOLESTEROL IN HDL (MG/DL) IN SER/PLAS: 51 MG/DL
CHOLESTEROL/HDL RATIO: 3
CREATININE (MG/DL) IN SER/PLAS: 0.64 MG/DL (ref 0.5–1.05)
EOSINOPHILS (10*3/UL) IN BLOOD BY AUTOMATED COUNT: 0.09 X10E9/L (ref 0–0.7)
EOSINOPHILS/100 LEUKOCYTES IN BLOOD BY AUTOMATED COUNT: 1.1 % (ref 0–6)
ERYTHROCYTE DISTRIBUTION WIDTH (RATIO) BY AUTOMATED COUNT: 13.8 % (ref 11.5–14.5)
ERYTHROCYTE MEAN CORPUSCULAR HEMOGLOBIN CONCENTRATION (G/DL) BY AUTOMATED: 31.7 G/DL (ref 32–36)
ERYTHROCYTE MEAN CORPUSCULAR VOLUME (FL) BY AUTOMATED COUNT: 86 FL (ref 80–100)
ERYTHROCYTES (10*6/UL) IN BLOOD BY AUTOMATED COUNT: 5.1 X10E12/L (ref 4–5.2)
GFR FEMALE: >90 ML/MIN/1.73M2
GLUCOSE (MG/DL) IN SER/PLAS: 80 MG/DL (ref 74–99)
HEMATOCRIT (%) IN BLOOD BY AUTOMATED COUNT: 44.1 % (ref 36–46)
HEMOGLOBIN (G/DL) IN BLOOD: 14 G/DL (ref 12–16)
IMMATURE GRANULOCYTES/100 LEUKOCYTES IN BLOOD BY AUTOMATED COUNT: 0.2 % (ref 0–0.9)
LDL: 60 MG/DL (ref 0–99)
LEUKOCYTES (10*3/UL) IN BLOOD BY AUTOMATED COUNT: 8.2 X10E9/L (ref 4.4–11.3)
LYMPHOCYTES (10*3/UL) IN BLOOD BY AUTOMATED COUNT: 3.18 X10E9/L (ref 1.2–4.8)
LYMPHOCYTES/100 LEUKOCYTES IN BLOOD BY AUTOMATED COUNT: 38.8 % (ref 13–44)
MONOCYTES (10*3/UL) IN BLOOD BY AUTOMATED COUNT: 0.61 X10E9/L (ref 0.1–1)
MONOCYTES/100 LEUKOCYTES IN BLOOD BY AUTOMATED COUNT: 7.4 % (ref 2–10)
NEUTROPHILS (10*3/UL) IN BLOOD BY AUTOMATED COUNT: 4.24 X10E9/L (ref 1.2–7.7)
NEUTROPHILS/100 LEUKOCYTES IN BLOOD BY AUTOMATED COUNT: 51.9 % (ref 40–80)
NON HDL CHOLESTEROL: 104 MG/DL
PLATELETS (10*3/UL) IN BLOOD AUTOMATED COUNT: 321 X10E9/L (ref 150–450)
POTASSIUM (MMOL/L) IN SER/PLAS: 4 MMOL/L (ref 3.5–5.3)
PROTEIN TOTAL: 7.3 G/DL (ref 6.4–8.2)
SODIUM (MMOL/L) IN SER/PLAS: 139 MMOL/L (ref 136–145)
THYROTROPIN (MIU/L) IN SER/PLAS BY DETECTION LIMIT <= 0.05 MIU/L: 2.11 MIU/L (ref 0.44–3.98)
THYROXINE (T4) FREE (NG/DL) IN SER/PLAS: 0.98 NG/DL (ref 0.61–1.12)
TRIGLYCERIDE (MG/DL) IN SER/PLAS: 218 MG/DL (ref 0–149)
UREA NITROGEN (MG/DL) IN SER/PLAS: 18 MG/DL (ref 6–23)
VLDL: 44 MG/DL (ref 0–40)

## 2023-07-06 PROCEDURE — 80053 COMPREHEN METABOLIC PANEL: CPT

## 2023-07-06 PROCEDURE — 84439 ASSAY OF FREE THYROXINE: CPT

## 2023-07-06 PROCEDURE — 83036 HEMOGLOBIN GLYCOSYLATED A1C: CPT

## 2023-07-06 PROCEDURE — 85025 COMPLETE CBC W/AUTO DIFF WBC: CPT

## 2023-07-06 PROCEDURE — 84443 ASSAY THYROID STIM HORMONE: CPT

## 2023-07-06 PROCEDURE — 36415 COLL VENOUS BLD VENIPUNCTURE: CPT

## 2023-07-06 PROCEDURE — 86593 SYPHILIS TEST NON-TREP QUANT: CPT

## 2023-07-06 PROCEDURE — 80061 LIPID PANEL: CPT

## 2023-07-06 PROCEDURE — 86592 SYPHILIS TEST NON-TREP QUAL: CPT

## 2023-07-07 ENCOUNTER — OFFICE VISIT (OUTPATIENT)
Dept: PRIMARY CARE | Facility: CLINIC | Age: 63
End: 2023-07-07
Payer: COMMERCIAL

## 2023-07-07 VITALS
TEMPERATURE: 97.9 F | WEIGHT: 230 LBS | HEIGHT: 67 IN | OXYGEN SATURATION: 97 % | DIASTOLIC BLOOD PRESSURE: 70 MMHG | SYSTOLIC BLOOD PRESSURE: 124 MMHG | HEART RATE: 82 BPM | RESPIRATION RATE: 16 BRPM | BODY MASS INDEX: 36.1 KG/M2

## 2023-07-07 DIAGNOSIS — M79.621 PAIN IN RIGHT UPPER ARM: ICD-10-CM

## 2023-07-07 DIAGNOSIS — M25.551 RIGHT HIP PAIN: ICD-10-CM

## 2023-07-07 DIAGNOSIS — Z13.820 SCREENING FOR OSTEOPOROSIS: ICD-10-CM

## 2023-07-07 DIAGNOSIS — E03.9 BORDERLINE HYPOTHYROIDISM: Primary | ICD-10-CM

## 2023-07-07 DIAGNOSIS — E11.9 TYPE 2 DIABETES MELLITUS WITHOUT COMPLICATION, WITHOUT LONG-TERM CURRENT USE OF INSULIN (MULTI): ICD-10-CM

## 2023-07-07 DIAGNOSIS — E78.2 MIXED HYPERLIPIDEMIA: ICD-10-CM

## 2023-07-07 DIAGNOSIS — C50.911 MALIGNANT NEOPLASM OF RIGHT FEMALE BREAST, UNSPECIFIED ESTROGEN RECEPTOR STATUS, UNSPECIFIED SITE OF BREAST (MULTI): ICD-10-CM

## 2023-07-07 DIAGNOSIS — G25.0 ESSENTIAL TREMOR: ICD-10-CM

## 2023-07-07 DIAGNOSIS — S46.011A STRAIN OF RIGHT ROTATOR CUFF CAPSULE, INITIAL ENCOUNTER: ICD-10-CM

## 2023-07-07 DIAGNOSIS — I10 ESSENTIAL HYPERTENSION: ICD-10-CM

## 2023-07-07 DIAGNOSIS — L71.9 ROSACEA: ICD-10-CM

## 2023-07-07 LAB
ESTIMATED AVERAGE GLUCOSE FOR HBA1C: 123 MG/DL
HEMOGLOBIN A1C/HEMOGLOBIN TOTAL IN BLOOD: 5.9 %
RPR MONITORING: REACTIVE
RPR TITER MONITORING: ABNORMAL

## 2023-07-07 PROCEDURE — 3044F HG A1C LEVEL LT 7.0%: CPT | Performed by: FAMILY MEDICINE

## 2023-07-07 PROCEDURE — 3078F DIAST BP <80 MM HG: CPT | Performed by: FAMILY MEDICINE

## 2023-07-07 PROCEDURE — 99214 OFFICE O/P EST MOD 30 MIN: CPT | Performed by: FAMILY MEDICINE

## 2023-07-07 PROCEDURE — 3074F SYST BP LT 130 MM HG: CPT | Performed by: FAMILY MEDICINE

## 2023-07-07 PROCEDURE — 1036F TOBACCO NON-USER: CPT | Performed by: FAMILY MEDICINE

## 2023-07-07 RX ORDER — MELOXICAM 15 MG/1
15 TABLET ORAL DAILY PRN
Qty: 30 TABLET | Refills: 3 | Status: SHIPPED | OUTPATIENT
Start: 2023-07-07 | End: 2023-11-06

## 2023-07-07 NOTE — PROGRESS NOTES
"Subjective   Patient ID: Emelina Powell is a 63 y.o. female who presents for Pelvic Pain (Right groin x4-5months/) and Arm Pain (Right x4-5months).  C19: d5pxanz  Flu: utd  Pap: utd due q3y  Mamm: utd  ColoRect:utd  Feels maybe pulled a few mo ago  Worse today  Adduction on right make worse  R breast ca surgery    HPI  Patient Active Problem List   Diagnosis    Borderline hypothyroidism    Essential hypertension    Essential tremor    HSV-1 (herpes simplex virus 1) infection    HSV-2 (herpes simplex virus 2) infection    Mixed hyperlipidemia    Primary osteoarthritis of both knees    Rosacea    Type 2 diabetes mellitus without complication, without long-term current use of insulin (CMS/[facility])       Past Surgical History:   Procedure Laterality Date    BREAST BIOPSY  06/22/2023    COLONOSCOPY  10/2020    no polyps    ESOPHAGOGASTRODUODENOSCOPY  10/2020    OTHER SURGICAL HISTORY  2018    Mohs surgery x 3    TOTAL KNEE ARTHROPLASTY Right 06/2019    WISDOM TOOTH EXTRACTION  1979       Review of Systems  This patient has   NO history of recent Covid nor flu symptoms,  NO Fever nor chills,  NO Chest pain, shortness of breath nor paroxysmal nocturnal dyspnea,  NO Nausea, vomiting, nor diarrhea,  NO Hematochezia nor melena,  NO Dysuria, hematuria, nor new incontinence issues  NO new severe headaches nor neurological complaints,  NO new issues with anxiety nor depression nor new psychiatric complaints,  NO suicidal nor homicidal ideations.     OBJECTIVE:  /70 (BP Location: Left arm, Patient Position: Sitting, BP Cuff Size: Adult)   Pulse 82   Temp 36.6 °C (97.9 °F) (Temporal)   Resp 16   Ht 1.702 m (5' 7\")   Wt 104 kg (230 lb)   LMP  (LMP Unknown)   SpO2 97%   BMI 36.02 kg/m²      General:  alert, oriented, no acute distress.  No obvious skin rashes noted.   No gait disturbance noted.    Mood is pleasant, not tearful, no signs of emotional distress.  Not appearing intoxicated or altered.   No voiced delusions, "   Normal, appropriate behavior.    HEENT: Normocephalic, atraumatic,   Pupils round, reactive to light  Extraocular motions intact and wnl  Tympanic membranes normal    Neck: no nuchal rigidity  No masses palpable.  No carotid bruits.  No thyromegaly.    Respiratory: Equal breath sounds  No wheezes,    rales,    nor rhonchi  No respiratory distress.    Heart: Regular rate and rhythm, no    murmurs  no rubs/gallops    Abdomen: no masses palpable, nontender, no rebound nor guarding overwt.    Extremities: NO cyanosis noted, no clubbing.   No edema noted.  2+dorsalis pedis pulses.    Normal-not antalgic, steady gait.  R hip pain w adduction sl slow gait  Fflex abd and int rotation fairly NL on r  On L WNL         Assessment/Plan     Problem List Items Addressed This Visit       Borderline hypothyroidism - Primary    Essential hypertension    Essential tremor    Mixed hyperlipidemia    Rosacea    Type 2 diabetes mellitus without complication, without long-term current use of insulin (CMS/[facility])     Other Visit Diagnoses       Right hip pain        Relevant Orders    XR hip right 2 or 3 views    Malignant neoplasm of right female breast, unspecified estrogen receptor status, unspecified site of breast (CMS/[facility])        Strain of right rotator cuff capsule, initial encounter            Today r hip not hurting  No edema      To ortho possibly w hip  Check xr  Add mobic every day prn  This medications risks, benefits, and alternatives were discussed with patient at length.  If any unwanted side effects occur-discontinue medicine and call the office for discussion.  PT  See me 8wks  Overall mood is good considering  Rad tx ordered  Will repeat rpr in 6mo

## 2023-07-18 ENCOUNTER — TELEPHONE (OUTPATIENT)
Dept: PRIMARY CARE | Facility: CLINIC | Age: 63
End: 2023-07-18
Payer: COMMERCIAL

## 2023-07-18 DIAGNOSIS — M79.621 PAIN IN RIGHT UPPER ARM: ICD-10-CM

## 2023-07-18 NOTE — TELEPHONE ENCOUNTER
CATARACTS, OU - VISUALLY SIGNIFICANT. PT  READY TO PROCEED WITH PREOP AND SURGERY. patient in need of a referral for PT to be faxed to [phone] for Right Arm, shoulder, forarm, elbow

## 2023-07-24 ENCOUNTER — HOSPITAL ENCOUNTER (OUTPATIENT)
Dept: RADIATION ONCOLOGY | Age: 63
Discharge: HOME | End: 2023-07-24
Payer: COMMERCIAL

## 2023-07-24 VITALS
DIASTOLIC BLOOD PRESSURE: 67 MMHG | HEART RATE: 72 BPM | OXYGEN SATURATION: 95 % | SYSTOLIC BLOOD PRESSURE: 96 MMHG | BODY MASS INDEX: 35.87 KG/M2 | TEMPERATURE: 97.8 F | RESPIRATION RATE: 18 BRPM | WEIGHT: 229 LBS

## 2023-07-24 DIAGNOSIS — D05.11 DUCTAL CARCINOMA IN SITU (DCIS) OF RIGHT BREAST: Primary | ICD-10-CM

## 2023-07-24 PROCEDURE — 99212 OFFICE O/P EST SF 10 MIN: CPT | Performed by: RADIOLOGY

## 2023-07-24 PROCEDURE — 99205 OFFICE O/P NEW HI 60 MIN: CPT | Performed by: RADIOLOGY

## 2023-07-24 PROCEDURE — 99497 ADVNCD CARE PLAN 30 MIN: CPT | Performed by: RADIOLOGY

## 2023-07-24 RX ORDER — MELOXICAM 15 MG/1
15 TABLET ORAL DAILY
COMMUNITY

## 2023-07-24 NOTE — PROGRESS NOTES
NURSING ASSESSMENT     Date: 2023        Patient Name: Berhane Macdonald     YOB: 1960      Age:  61 y.o. MRN: [id number]       Chaperone [] Yes   [x] No      Advance Directives:   Do you currently have completed advance directives (living will)? [x] Yes   [] No         *If yes, please bring us a copy for your records. *If no, would you like info or assistance in completing advance directives (living will)? [] Yes   [x] No    Pain Score:   Pain Score (1-10): none   Pain Location: right bicep and forearm for the past 4-5 months  Pain Duration: daily  Pain Management/Control: meloxicam relieves to zero      Is pain affecting your ability to take care of yourself or move throughout your home? [] Yes   [x] No    General: No Problems  Patient has gained weight [] Yes   [x] No  Patient has lost weight [] Yes   [x] No  How much weight in pounds and over what length of time:     Eyes (Ophthalmic):  No Problem wears glasses     Skin (Dermatological): dry skin, history of basal cell skin cancers x3, goes for regular skin checks     ENT: No Problems     Respiratory: No Problems     Cardiovascular: No Problems      Device   [] Yes   [x] No   Copy of Card Obtained [] Yes   [x] No    Gastrointestinal: No Problems    Genito-Urinary: No Problems     Breast: right breast lumpectomy, incisions healing well, has some swelling around upper outer right breast and incision     Musculoskeletal: has pain Right bicep and forearm    Neurological: No Problems      Hematological and Lymphatic: No Problems     Endocrine: Diabetes Mellitus and Thyroid Problems, takes levothyroxine    Gyn History:   /Para: 2/2  Age of Menarche: 15  Age of Menopause 46  Vaginal Bleeding:  none  First Pregnancy: 20  Breast Feeding:  no  Last Menstrual period: 46  Oral Contraceptives: yes     Number of years: 21  Hormone Replacement therapy no     Number of Years:   Last Pap Smear: last one about OR patients prescribed opiate by non-palliative care provider and pain not adequately controlled OR patient on long-term opiates for chronic pain and high risk for tolerance/abuse) []  Anorexia/failure to thrive            []  Unintentional weight loss of greater than 10 lbs in 1 month       []  Shortness of breath/increasing O2 needs via supplemental source      []  Cognitive impairment            []  Total score for section # 4-      Functional Status of Patient  ECOG 2              [] (Score 2 Point)  ECOG 3             [] (Score 3 Points)  Total score for section # 5-      Goals of Care              (Score 6 Points)  If patient wishes to pursue hospice and/or wishes to have modifications in their goals of care that involve stopping active treatment. []    Total score for section # 6-    TOTAL SCORE > 6 Consult Palliative Care (requires provider orders in Epic  Total score-            HEREDITARY PROSTATE CANCER ASSESSMENT SCREEN          [x] This screening is not applicable to the patient.   [] Metastatic Prostate Cancer  [] High/Very high risk prostate cancer  [] Delilah Score of 7 & of the following:   [] 1 Family member with metastatic prostate cancer   [] 1 Family member with ovarian cancer   [] 1 Family member with pancreatic cancer   [] 1 [place] member with breast cancer 48 or younger   [] 2 Family members on the same side of the family with breast or prostate cancer at any age   [] [address]  [] Physician notified of any positive answers  [] Assessment negative

## 2023-07-24 NOTE — PROGRESS NOTES
SW met with pt as she was seen for consultation in radiation oncology for treatment of her breast cancer. Pt continues to present as pleasant, articulate, and competent. Although she endorsed a distress of 9/10 today, which she relates to frustrations with billing issues, she is taking active and appropriate steps to address and mitigate the issue. Per pt's report, there seems to have been an error or misdirection of her payment, as confirmed by her customer service interactions, which she hopes will be soon resolved. Further, pt is able to problem-solve and list additional resources she may employ should she continue to encounter difficulty. Pt simply reports feeling frustrated today, though as a retired , pt states she is used to managing distress and resolving concerns. SW provided reassurance and affirmation that she is taking appropriate steps to address her concerns. Pt is known to SW having met on 6/20/2023 just prior to her breast surgery. (See note for further social history). She remains engaging and interactive, expressing her hope to complete treatment and resume travel, which she enjoys. Pt was reminded of supportive services at the cancer center. She has retained SW business contact from previous interaction. She was invited to seek support should she have further question or concern. SW will continue to follow through radiation treatment.

## 2023-07-26 ENCOUNTER — HOSPITAL ENCOUNTER (OUTPATIENT)
Dept: RADIATION ONCOLOGY | Age: 63
Discharge: HOME | End: 2023-07-26
Payer: COMMERCIAL

## 2023-07-26 DIAGNOSIS — D05.11 DUCTAL CARCINOMA IN SITU (DCIS) OF RIGHT BREAST: Primary | ICD-10-CM

## 2023-07-26 PROCEDURE — 77290 THER RAD SIMULAJ FIELD CPLX: CPT | Performed by: RADIOLOGY

## 2023-07-26 PROCEDURE — 77334 RADIATION TREATMENT AID(S): CPT | Performed by: RADIOLOGY

## 2023-08-01 ENCOUNTER — HOSPITAL ENCOUNTER (OUTPATIENT)
Dept: RADIATION ONCOLOGY | Age: 63
Discharge: HOME | End: 2023-08-01
Payer: COMMERCIAL

## 2023-08-01 PROCEDURE — 77280 THER RAD SIMULAJ FIELD SMPL: CPT | Performed by: RADIOLOGY

## 2023-08-02 ENCOUNTER — HOSPITAL ENCOUNTER (OUTPATIENT)
Dept: RADIATION ONCOLOGY | Age: 63
Discharge: HOME | End: 2023-08-02
Payer: COMMERCIAL

## 2023-08-02 PROCEDURE — 77387 GUIDANCE FOR RADJ TX DLVR: CPT | Performed by: RADIOLOGY

## 2023-08-02 PROCEDURE — 77412 RADIATION TX DELIVERY LVL 3: CPT | Performed by: RADIOLOGY

## 2023-08-03 ENCOUNTER — HOSPITAL ENCOUNTER (OUTPATIENT)
Dept: RADIATION ONCOLOGY | Age: 63
Discharge: HOME | End: 2023-08-03
Payer: COMMERCIAL

## 2023-08-03 VITALS
WEIGHT: 230.6 LBS | BODY MASS INDEX: 36.12 KG/M2 | DIASTOLIC BLOOD PRESSURE: 97 MMHG | OXYGEN SATURATION: 97 % | HEART RATE: 87 BPM | SYSTOLIC BLOOD PRESSURE: 112 MMHG | RESPIRATION RATE: 18 BRPM | TEMPERATURE: 98 F

## 2023-08-03 DIAGNOSIS — D05.11 DUCTAL CARCINOMA IN SITU (DCIS) OF RIGHT BREAST: Primary | ICD-10-CM

## 2023-08-03 PROCEDURE — 77334 RADIATION TREATMENT AID(S): CPT | Performed by: RADIOLOGY

## 2023-08-03 PROCEDURE — 77412 RADIATION TX DELIVERY LVL 3: CPT | Performed by: RADIOLOGY

## 2023-08-03 PROCEDURE — 77387 GUIDANCE FOR RADJ TX DLVR: CPT | Performed by: RADIOLOGY

## 2023-08-03 PROCEDURE — 77307 TELETHX ISODOSE PLAN CPLX: CPT | Performed by: RADIOLOGY

## 2023-08-03 NOTE — PROGRESS NOTES
tablet by mouth daily      biotin (VITAMIN B7) 5 MG TABS tablet Take 1 tablet by mouth daily      atorvastatin (LIPITOR) 20 MG tablet Take 1 tablet by mouth daily 90 tablet 3    metFORMIN (GLUCOPHAGE) 500 MG tablet Take 1 tablet by mouth nightly 90 tablet 3    doxycycline hyclate (VIBRAMYCIN) 100 MG capsule TAKE ONE CAPSULE BY MOUTH TWICE DAILY  3    Calcium Carbonate-Vit D-Min 7602-8343 MG-UNIT CHEW Take 1 tablet by mouth daily. 90 tablet 3     No current facility-administered medications for this encounter. * New    PHYSICAL EXAM:       ECO - Symptomatic but completely ambulatory (Restricted in physically strenuous activity but ambulatory and able to carry out work of a light or sedentary nature. For example, light housework, office work)    General: NAD, AO x 3, Mentation is clear with appropriate affect. HEENT: Normocephalic, atraumatic  Thorax:  Unlabored  Breasts:  Treated breast, normal appearance, no significant skin changes in treatment area  Skin - treatment portal: SEE above. Skin intact with no treatment effects noted. Chemotherapy Update: None    Treatment Imaging: [facility]    ASSESSMENT: No significant radiation side effects. Responding appropriately to symptomatic management. New medications, diagnostic results: Continue treatment as planned    PLAN: Again reviewed potential side effects of radiation for the patient's treatment. Continue local/topical care. Continue current radiation course as prescribed.

## 2023-08-04 ENCOUNTER — HOSPITAL ENCOUNTER (OUTPATIENT)
Dept: RADIATION ONCOLOGY | Age: 63
Discharge: HOME | End: 2023-08-04
Payer: COMMERCIAL

## 2023-08-04 PROCEDURE — 77412 RADIATION TX DELIVERY LVL 3: CPT | Performed by: RADIOLOGY

## 2023-08-04 PROCEDURE — 77387 GUIDANCE FOR RADJ TX DLVR: CPT | Performed by: RADIOLOGY

## 2023-08-07 ENCOUNTER — APPOINTMENT (OUTPATIENT)
Dept: RADIATION ONCOLOGY | Age: 63
End: 2023-08-07
Payer: COMMERCIAL

## 2023-08-07 PROCEDURE — 77412 RADIATION TX DELIVERY LVL 3: CPT | Performed by: RADIOLOGY

## 2023-08-07 PROCEDURE — 77387 GUIDANCE FOR RADJ TX DLVR: CPT | Performed by: RADIOLOGY

## 2023-08-08 ENCOUNTER — APPOINTMENT (OUTPATIENT)
Dept: RADIATION ONCOLOGY | Age: 63
End: 2023-08-08
Payer: COMMERCIAL

## 2023-08-08 ENCOUNTER — OFFICE VISIT (OUTPATIENT)
Dept: OBGYN CLINIC | Age: 63
End: 2023-08-08
Payer: COMMERCIAL

## 2023-08-08 VITALS
BODY MASS INDEX: 36.1 KG/M2 | DIASTOLIC BLOOD PRESSURE: 78 MMHG | HEIGHT: 67 IN | SYSTOLIC BLOOD PRESSURE: 110 MMHG | WEIGHT: 230 LBS

## 2023-08-08 DIAGNOSIS — Z01.419 ENCOUNTER FOR WELL WOMAN EXAM WITH ROUTINE GYNECOLOGICAL EXAM: Primary | ICD-10-CM

## 2023-08-08 DIAGNOSIS — Z11.51 SCREENING FOR HUMAN PAPILLOMAVIRUS: ICD-10-CM

## 2023-08-08 DIAGNOSIS — Z01.419 ENCOUNTER FOR WELL WOMAN EXAM WITH ROUTINE GYNECOLOGICAL EXAM: ICD-10-CM

## 2023-08-08 PROCEDURE — 77417 THER RADIOLOGY PORT IMAGE(S): CPT | Performed by: RADIOLOGY

## 2023-08-08 PROCEDURE — 99396 PREV VISIT EST AGE 40-64: CPT | Performed by: OBSTETRICS & GYNECOLOGY

## 2023-08-08 PROCEDURE — 77387 GUIDANCE FOR RADJ TX DLVR: CPT | Performed by: RADIOLOGY

## 2023-08-08 PROCEDURE — 3074F SYST BP LT 130 MM HG: CPT | Performed by: OBSTETRICS & GYNECOLOGY

## 2023-08-08 PROCEDURE — 77336 RADIATION PHYSICS CONSULT: CPT | Performed by: RADIOLOGY

## 2023-08-08 PROCEDURE — 99213 OFFICE O/P EST LOW 20 MIN: CPT | Performed by: OBSTETRICS & GYNECOLOGY

## 2023-08-08 PROCEDURE — 77412 RADIATION TX DELIVERY LVL 3: CPT | Performed by: RADIOLOGY

## 2023-08-08 PROCEDURE — 3078F DIAST BP <80 MM HG: CPT | Performed by: OBSTETRICS & GYNECOLOGY

## 2023-08-08 RX ORDER — ANASTROZOLE 1 MG/1
1 TABLET ORAL DAILY
COMMUNITY

## 2023-08-08 SDOH — ECONOMIC STABILITY: INCOME INSECURITY: HOW HARD IS IT FOR YOU TO PAY FOR THE VERY BASICS LIKE FOOD, HOUSING, MEDICAL CARE, AND HEATING?: NOT HARD AT ALL

## 2023-08-08 SDOH — ECONOMIC STABILITY: HOUSING INSECURITY
IN THE LAST 12 MONTHS, WAS THERE A TIME WHEN YOU DID NOT HAVE A STEADY PLACE TO SLEEP OR SLEPT IN A SHELTER (INCLUDING NOW)?: NO

## 2023-08-08 SDOH — ECONOMIC STABILITY: FOOD INSECURITY: WITHIN THE PAST 12 MONTHS, YOU WORRIED THAT YOUR FOOD WOULD RUN OUT BEFORE YOU GOT MONEY TO BUY MORE.: NEVER TRUE

## 2023-08-08 SDOH — ECONOMIC STABILITY: FOOD INSECURITY: WITHIN THE PAST 12 MONTHS, THE FOOD YOU BOUGHT JUST DIDN'T LAST AND YOU DIDN'T HAVE MONEY TO GET MORE.: NEVER TRUE

## 2023-08-08 ASSESSMENT — PATIENT HEALTH QUESTIONNAIRE - PHQ9
SUM OF ALL RESPONSES TO PHQ QUESTIONS 1-9: 0
2. FEELING DOWN, DEPRESSED OR HOPELESS: 0
SUM OF ALL RESPONSES TO PHQ QUESTIONS 1-9: 0
SUM OF ALL RESPONSES TO PHQ9 QUESTIONS 1 & 2: 0
1. LITTLE INTEREST OR PLEASURE IN DOING THINGS: 0

## 2023-08-08 ASSESSMENT — ENCOUNTER SYMPTOMS
CONSTIPATION: 0
RECTAL PAIN: 0
EYES NEGATIVE: 1
ALLERGIC/IMMUNOLOGIC NEGATIVE: 1
ABDOMINAL PAIN: 0
NAUSEA: 0
BLOOD IN STOOL: 0
VOMITING: 0
RESPIRATORY NEGATIVE: 1
ABDOMINAL DISTENTION: 0
ANAL BLEEDING: 0
DIARRHEA: 0

## 2023-08-08 ASSESSMENT — VISUAL ACUITY: OU: 1

## 2023-08-08 NOTE — PROGRESS NOTES
The patient was asked if she would like a chaperone present for her intimate exam. She  Declined the chaperone.  Halima Henriquez CMA ([address] [facility])

## 2023-08-09 ENCOUNTER — APPOINTMENT (OUTPATIENT)
Dept: RADIATION ONCOLOGY | Age: 63
End: 2023-08-09
Payer: COMMERCIAL

## 2023-08-09 PROCEDURE — 77412 RADIATION TX DELIVERY LVL 3: CPT | Performed by: RADIOLOGY

## 2023-08-09 PROCEDURE — 77387 GUIDANCE FOR RADJ TX DLVR: CPT | Performed by: RADIOLOGY

## 2023-08-10 ENCOUNTER — HOSPITAL ENCOUNTER (OUTPATIENT)
Dept: RADIATION ONCOLOGY | Age: 63
Discharge: HOME | End: 2023-08-10
Payer: COMMERCIAL

## 2023-08-10 ENCOUNTER — APPOINTMENT (OUTPATIENT)
Dept: RADIATION ONCOLOGY | Age: 63
End: 2023-08-10
Payer: COMMERCIAL

## 2023-08-10 VITALS
WEIGHT: 231.6 LBS | HEART RATE: 77 BPM | SYSTOLIC BLOOD PRESSURE: 134 MMHG | DIASTOLIC BLOOD PRESSURE: 72 MMHG | BODY MASS INDEX: 36.82 KG/M2 | TEMPERATURE: 97.3 F | OXYGEN SATURATION: 96 %

## 2023-08-10 DIAGNOSIS — D05.11 DUCTAL CARCINOMA IN SITU (DCIS) OF RIGHT BREAST: Primary | ICD-10-CM

## 2023-08-10 PROCEDURE — 77412 RADIATION TX DELIVERY LVL 3: CPT | Performed by: RADIOLOGY

## 2023-08-10 PROCEDURE — 77387 GUIDANCE FOR RADJ TX DLVR: CPT | Performed by: RADIOLOGY

## 2023-08-11 ENCOUNTER — APPOINTMENT (OUTPATIENT)
Dept: RADIATION ONCOLOGY | Age: 63
End: 2023-08-11
Payer: COMMERCIAL

## 2023-08-11 PROCEDURE — 77412 RADIATION TX DELIVERY LVL 3: CPT | Performed by: RADIOLOGY

## 2023-08-11 PROCEDURE — 77387 GUIDANCE FOR RADJ TX DLVR: CPT | Performed by: RADIOLOGY

## 2023-08-14 ENCOUNTER — APPOINTMENT (OUTPATIENT)
Dept: RADIATION ONCOLOGY | Age: 63
End: 2023-08-14
Payer: COMMERCIAL

## 2023-08-14 PROCEDURE — 77387 GUIDANCE FOR RADJ TX DLVR: CPT | Performed by: RADIOLOGY

## 2023-08-14 PROCEDURE — 77412 RADIATION TX DELIVERY LVL 3: CPT | Performed by: RADIOLOGY

## 2023-08-14 NOTE — PROGRESS NOTES
Umm Coleman   [id number]  1960   Patient Mid-Point Distress Screening Form   Please select the number that describes how much distress you have experiened in the past week (0-10): 0    Please select the number that descrbes how much distress you have experienced today (0-10): 0    If you responded with a score of 6 or above to the first tow questions; please address the following concerns:    Practical Concerns 0-10 Comment        Work, Concerns about Job          Finances, Bills, Insurance          Housing          Transportation          Availability of Caregiver     Family Concerns          Dealing with Children          Dealing with Partner          Ability to have [address] Issues     Emotional Concerns          Sadness, Depression          Anxiety, Worry, Fear          Concerns about Appearance          Loss of Interest in Usual Activities     Spiritual Concerns          Connection to a higher power          Sense of meaning and purpose          Support for my spiritual community     Physical Concerns          Nausea          Eating, Loss of Appetite          Pain          Fatigue       Other Concerns/Notes: SW met with pt following her radiation treatment. She continues to present as friendly and conversant. She states she feels she is doing well with treatment, just experiencing some fatigue. She attributes her low distress to the notion that, \"I think this is really not a big thing. I caught my cancer early. I had my surgery and now just 4 weeks of radiation. It's very doable. \" She adds, however, that she is moreover worried about the hormone treatment following as she has worked very hard to lose weight and maintain a fitness regimen, and she would like to be able to maintain this trend. She states she may speak with a dietitian, perhaps through her insurance company. Additionally, she is working out with a  and doing a fitness class.  Discussed the

## 2023-08-15 ENCOUNTER — APPOINTMENT (OUTPATIENT)
Dept: RADIATION ONCOLOGY | Age: 63
End: 2023-08-15
Payer: COMMERCIAL

## 2023-08-15 LAB
HPV HR 12 DNA SPEC QL NAA+PROBE: NOT DETECTED
HPV16 DNA SPEC QL NAA+PROBE: NOT DETECTED
HPV16+18+H RISK 12 DNA SPEC-IMP: NORMAL
HPV18 DNA SPEC QL NAA+PROBE: NOT DETECTED

## 2023-08-15 PROCEDURE — 77417 THER RADIOLOGY PORT IMAGE(S): CPT | Performed by: RADIOLOGY

## 2023-08-15 PROCEDURE — 77336 RADIATION PHYSICS CONSULT: CPT | Performed by: RADIOLOGY

## 2023-08-15 PROCEDURE — 77387 GUIDANCE FOR RADJ TX DLVR: CPT | Performed by: RADIOLOGY

## 2023-08-15 PROCEDURE — 77412 RADIATION TX DELIVERY LVL 3: CPT | Performed by: RADIOLOGY

## 2023-08-16 ENCOUNTER — APPOINTMENT (OUTPATIENT)
Dept: RADIATION ONCOLOGY | Age: 63
End: 2023-08-16
Payer: COMMERCIAL

## 2023-08-16 PROCEDURE — 77387 GUIDANCE FOR RADJ TX DLVR: CPT | Performed by: RADIOLOGY

## 2023-08-16 PROCEDURE — 77412 RADIATION TX DELIVERY LVL 3: CPT | Performed by: RADIOLOGY

## 2023-08-17 ENCOUNTER — APPOINTMENT (OUTPATIENT)
Dept: RADIATION ONCOLOGY | Age: 63
End: 2023-08-17
Payer: COMMERCIAL

## 2023-08-17 ENCOUNTER — HOSPITAL ENCOUNTER (OUTPATIENT)
Dept: RADIATION ONCOLOGY | Age: 63
Discharge: HOME | End: 2023-08-17
Payer: COMMERCIAL

## 2023-08-17 VITALS
OXYGEN SATURATION: 96 % | SYSTOLIC BLOOD PRESSURE: 133 MMHG | TEMPERATURE: 97.6 F | WEIGHT: 230.8 LBS | DIASTOLIC BLOOD PRESSURE: 73 MMHG | HEART RATE: 76 BPM | RESPIRATION RATE: 18 BRPM | BODY MASS INDEX: 36.69 KG/M2

## 2023-08-17 DIAGNOSIS — D05.11 DUCTAL CARCINOMA IN SITU (DCIS) OF RIGHT BREAST: Primary | ICD-10-CM

## 2023-08-17 PROCEDURE — 77412 RADIATION TX DELIVERY LVL 3: CPT | Performed by: RADIOLOGY

## 2023-08-17 PROCEDURE — 77387 GUIDANCE FOR RADJ TX DLVR: CPT | Performed by: RADIOLOGY

## 2023-08-18 ENCOUNTER — APPOINTMENT (OUTPATIENT)
Dept: RADIATION ONCOLOGY | Age: 63
End: 2023-08-18
Payer: COMMERCIAL

## 2023-08-18 PROCEDURE — 77387 GUIDANCE FOR RADJ TX DLVR: CPT | Performed by: RADIOLOGY

## 2023-08-18 PROCEDURE — 77412 RADIATION TX DELIVERY LVL 3: CPT | Performed by: RADIOLOGY

## 2023-08-21 ENCOUNTER — APPOINTMENT (OUTPATIENT)
Dept: RADIATION ONCOLOGY | Age: 63
End: 2023-08-21
Payer: COMMERCIAL

## 2023-08-21 PROCEDURE — 77412 RADIATION TX DELIVERY LVL 3: CPT | Performed by: RADIOLOGY

## 2023-08-21 PROCEDURE — 77387 GUIDANCE FOR RADJ TX DLVR: CPT | Performed by: RADIOLOGY

## 2023-08-22 ENCOUNTER — APPOINTMENT (OUTPATIENT)
Dept: RADIATION ONCOLOGY | Age: 63
End: 2023-08-22
Payer: COMMERCIAL

## 2023-08-22 ENCOUNTER — HOSPITAL ENCOUNTER (OUTPATIENT)
Dept: PHYSICAL THERAPY | Age: 63
Setting detail: THERAPIES SERIES
Discharge: HOME | End: 2023-08-22
Payer: COMMERCIAL

## 2023-08-22 PROCEDURE — 77412 RADIATION TX DELIVERY LVL 3: CPT | Performed by: RADIOLOGY

## 2023-08-22 PROCEDURE — 97110 THERAPEUTIC EXERCISES: CPT

## 2023-08-22 PROCEDURE — 77387 GUIDANCE FOR RADJ TX DLVR: CPT | Performed by: RADIOLOGY

## 2023-08-22 PROCEDURE — 77336 RADIATION PHYSICS CONSULT: CPT | Performed by: RADIOLOGY

## 2023-08-22 PROCEDURE — 97161 PT EVAL LOW COMPLEX 20 MIN: CPT

## 2023-08-22 ASSESSMENT — PAIN DESCRIPTION - LOCATION: LOCATION: CHEST

## 2023-08-22 ASSESSMENT — PAIN SCALES - GENERAL: PAINLEVEL_OUTOF10: 1

## 2023-08-22 ASSESSMENT — PAIN DESCRIPTION - ORIENTATION: ORIENTATION: RIGHT

## 2023-08-22 NOTE — THERAPY EVALUATION
[address] [facility]     Ph: [phone]  Fax: [phone]      [x] Certification  [] Recertification []  Plan of Care  [] Progress Note [] Discharge      Referring Provider: Elba Null MD     From:  Abhijit Rodriguez, PT  Patient: Angelika Cabrera (61 y.o. female) : 1960 Date: 2023  Medical Diagnosis: Ductal carcinoma in situ (DCIS) of right breast [D05.11]       Treatment Diagnosis: Rt elbow/arm pain    Progress Report Period from:  2023  to 2023    Visits to Date: 1 No Show: 0 Cancelled Appts: 0    OBJECTIVE:   Palpation:   Right Elbow Palpation: Mild TTP at lateral Rt elbow    Left AROM  Right AROM      General AROM UE: Right WFL, Left WFL    General AROM UE: Right WFL, Left WFL          Left Strength  Right Strength         Strength LUE  L Shoulder Flexion: 5/5  L Shoulder ABduction: 5/5  L Shoulder Internal Rotation: 5/5  L Shoulder External Rotation: 5/5  L Elbow Flexion: 5/5  L Elbow Extension: 5/5  L Forearm Pron: 5/5  L Forearm Sup: 5/5  L Wrist Flexion: 5/5  L Wrist Extension: 5/5  L Wrist Radial Deviation: 5/5  L Wrist Ulnar Deviation: 5/5    Strength RUE  R Shoulder Flexion: 5/5  R Shoulder ABduction: 5/5  R Shoulder Internal Rotation: 5/5  R Shoulder External Rotation: 5/5  R Elbow Flexion: 5/5  R Elbow Extension: 5/5  R Forearm Pron: 5/5  R Forearm Sup: 5/5  R Wrist Flexion: 5/5  R Wrist Extension: 5/5  R Wrist Radial Deviation: 5/5  R Wrist Ulnar Deviation: 5/5       Body Structures, Functions, Activity Limitations Requiring Skilled Therapeutic Intervention: Increased pain  Assessment: Pt presents with ongoing Rt elbow and arm pain that began with weight training prior to her Rt breast ca treatments. Pt demonstrate good shoulder and elbow ROM. Pt without any strength deficits throughout Rt UE.   Discussed and reviewed various exercises to slow return pt to weight lifting

## 2023-08-22 NOTE — PROGRESS NOTES
[facility]  PHYSICAL THERAPY EVALUATION    Physical Therapy: Initial Evaluation    Patient: Katarina Fortune (10 y.o.     female)   Examination Date: 2023   :  1960 ;    Confirmed: Yes MRN: [id number]  CSN: [id number]   Insurance: Payor: Evan Spencer / Plan: Myrtie Dust / Product Type: *No Product type* /   Insurance ID: [id number] - ([place] [employer]) Secondary Insurance (if applicable):    Referring Physician: Mariel Belcher MD       Visits to Date/Visits Approved:     No Show/Cancelled Appts: 0      Medical Diagnosis: Ductal carcinoma in situ (DCIS) of right breast [D05.11]        Treatment Diagnosis: Rt elbow/arm pain     PERTINENT MEDICAL HISTORY           Medical History: Chart Reviewed: Yes   Past Medical History:   Diagnosis Date    Arthritis     Breast cancer ([address]) 2023    Cancer ([address])     BASAL CELL    Diabetes mellitus ([address])     Hayfever     Hyperlipidemia     Hypertension     Obesity     Osteoarthritis     Rosacea     Thyroid disease     Vitamin D deficiency      Surgical History:   Past Surgical History:   Procedure Laterality Date    BREAST BIOPSY N/A 2023    RIGHT BREAST NEEDLE LOCALIZED LUMPECTOMY performed by Aditi Lewis MD at [address] [place] [address]      negative-[place]    ENDOSCOPY, COLON, DIAGNOSTIC      [facility] BREAST BIOPSY RIGHT Right 2023    [facility] STEROT[facility] LOC BREAST BIOPSY RIGHT [facility] SURGERY  2008    x3    TOTAL KNEE ARTHROPLASTY Left 2022    TOTAL KNEE ARTHROPLASTY Right 2019       Medications:   Current Outpatient Medications:     anastrozole (ARIMIDEX) 1 MG tablet, Take 1 tablet by mouth daily (Patient not taking: Reported on 8/10/2023), Disp: , Rfl:     meloxicam (MOBIC) 15 MG tablet, Take 1 tablet by mouth daily, Disp: , Rfl:     spironolactone (ALDACTONE) 25 MG tablet, Take 1 tablet by mouth daily, Disp: , Rfl:     levothyroxine (SYNTHROID) 25 MCG tablet,

## 2023-08-23 ENCOUNTER — APPOINTMENT (OUTPATIENT)
Dept: RADIATION ONCOLOGY | Age: 63
End: 2023-08-23
Payer: COMMERCIAL

## 2023-08-23 PROCEDURE — 77387 GUIDANCE FOR RADJ TX DLVR: CPT | Performed by: RADIOLOGY

## 2023-08-23 PROCEDURE — 77412 RADIATION TX DELIVERY LVL 3: CPT | Performed by: RADIOLOGY

## 2023-08-24 ENCOUNTER — APPOINTMENT (OUTPATIENT)
Dept: RADIATION ONCOLOGY | Age: 63
End: 2023-08-24
Payer: COMMERCIAL

## 2023-08-24 PROCEDURE — 77387 GUIDANCE FOR RADJ TX DLVR: CPT | Performed by: RADIOLOGY

## 2023-08-24 PROCEDURE — 77014 CHG CT GUIDANCE RADIATION THERAPY FLDS PLACEMENT: CPT | Performed by: RADIOLOGY

## 2023-08-24 PROCEDURE — 77412 RADIATION TX DELIVERY LVL 3: CPT | Performed by: RADIOLOGY

## 2023-08-25 ENCOUNTER — HOSPITAL ENCOUNTER (OUTPATIENT)
Dept: RADIATION ONCOLOGY | Age: 63
Discharge: HOME | End: 2023-08-25
Payer: COMMERCIAL

## 2023-08-25 ENCOUNTER — APPOINTMENT (OUTPATIENT)
Dept: RADIATION ONCOLOGY | Age: 63
End: 2023-08-25
Payer: COMMERCIAL

## 2023-08-25 VITALS
DIASTOLIC BLOOD PRESSURE: 67 MMHG | TEMPERATURE: 96.9 F | RESPIRATION RATE: 20 BRPM | HEART RATE: 76 BPM | WEIGHT: 233.6 LBS | OXYGEN SATURATION: 98 % | BODY MASS INDEX: 37.14 KG/M2 | SYSTOLIC BLOOD PRESSURE: 129 MMHG

## 2023-08-25 DIAGNOSIS — D05.11 DUCTAL CARCINOMA IN SITU (DCIS) OF RIGHT BREAST: Primary | ICD-10-CM

## 2023-08-25 PROCEDURE — 77412 RADIATION TX DELIVERY LVL 3: CPT | Performed by: RADIOLOGY

## 2023-08-25 PROCEDURE — 77387 GUIDANCE FOR RADJ TX DLVR: CPT | Performed by: RADIOLOGY

## 2023-08-25 RX ORDER — MOMETASONE FUROATE 1 MG/G
CREAM TOPICAL
Qty: 50 G | Refills: 2 | Status: SHIPPED | OUTPATIENT
Start: 2023-08-25

## 2023-08-28 ENCOUNTER — APPOINTMENT (OUTPATIENT)
Dept: RADIATION ONCOLOGY | Age: 63
End: 2023-08-28
Payer: COMMERCIAL

## 2023-08-28 PROCEDURE — 77412 RADIATION TX DELIVERY LVL 3: CPT | Performed by: RADIOLOGY

## 2023-08-28 PROCEDURE — 77387 GUIDANCE FOR RADJ TX DLVR: CPT | Performed by: RADIOLOGY

## 2023-08-28 PROCEDURE — 77014 CHG CT GUIDANCE RADIATION THERAPY FLDS PLACEMENT: CPT | Performed by: RADIOLOGY

## 2023-08-29 ENCOUNTER — APPOINTMENT (OUTPATIENT)
Dept: RADIATION ONCOLOGY | Age: 63
End: 2023-08-29
Payer: COMMERCIAL

## 2023-08-29 PROCEDURE — 77412 RADIATION TX DELIVERY LVL 3: CPT | Performed by: RADIOLOGY

## 2023-08-29 PROCEDURE — 77014 CHG CT GUIDANCE RADIATION THERAPY FLDS PLACEMENT: CPT | Performed by: RADIOLOGY

## 2023-08-29 PROCEDURE — 77387 GUIDANCE FOR RADJ TX DLVR: CPT | Performed by: RADIOLOGY

## 2023-08-29 PROCEDURE — 77336 RADIATION PHYSICS CONSULT: CPT | Performed by: RADIOLOGY

## 2023-08-29 NOTE — PROGRESS NOTES
Discharge instructions provided and reviewed with patient. Questions answered. Pt will follow up in 1 month with Dr Luc Rodney.

## 2023-08-29 NOTE — PROGRESS NOTES
[address] [facility]           Radiation Oncology      [address] [facility], [address] [facility]        Yvonne Halon: [phone]        F: [phone]       [place]                   Dr. Bhakti Rodrigues MD PhD    RADIATION TREATMENT SUMMARY NOTE     Date of Service: 2023  Patient ID: Hugo Bass   : 1960  MRN: [id number]   Acct Number: [de-identified]       Patient Name:  Hugo Bass,  1960,  61 y.o., female       Referring Physician: Ondina Burgos MD  [address] [facility],  [address] [place] [address]      PCP: Paola Roberts MD       Diagnosis:  Ductal carcinoma in situ (DCIS) of right breast  Staging form: Breast, AJCC 8th Edition  - Clinical: Stage 0 (cTis (DCIS), cN0, cM0, G2, ER+, CT+) - Signed by Ondina Burgos MD on 2023       Recent History:  ***    Site Start TX Last [place] ED Fractions Dose Fx Dose Technique   Rt breast 23 21 16 4256 cGy 266 cGy Tangents   Rt breast Cd  CUMM: 23 5 4 1000 cGy  5256 cGy 250 cGu 5 Field     Concurrent therapy:      ***    Technique:                 ***      Treatment course:       ***    Plan:  ***         Bhakti Rodrigues MD PhD  Radiation Oncologist, [facility]    Department of [address] [place] [address]

## 2023-09-24 DIAGNOSIS — L65.9 NONSCARRING HAIR LOSS, UNSPECIFIED: ICD-10-CM

## 2023-09-25 RX ORDER — SPIRONOLACTONE 25 MG/1
25 TABLET ORAL DAILY
Qty: 90 TABLET | Refills: 3 | Status: SHIPPED | OUTPATIENT
Start: 2023-09-25

## 2023-09-27 ENCOUNTER — HOSPITAL ENCOUNTER (OUTPATIENT)
Dept: RADIATION ONCOLOGY | Age: 63
Discharge: HOME | End: 2023-09-27
Payer: COMMERCIAL

## 2023-09-27 VITALS
OXYGEN SATURATION: 96 % | HEART RATE: 72 BPM | WEIGHT: 232.8 LBS | TEMPERATURE: 97 F | SYSTOLIC BLOOD PRESSURE: 122 MMHG | DIASTOLIC BLOOD PRESSURE: 62 MMHG | BODY MASS INDEX: 37.01 KG/M2 | RESPIRATION RATE: 18 BRPM

## 2023-09-27 DIAGNOSIS — D05.11 DUCTAL CARCINOMA IN SITU (DCIS) OF RIGHT BREAST: Primary | ICD-10-CM

## 2023-09-27 PROCEDURE — 99212 OFFICE O/P EST SF 10 MIN: CPT | Performed by: RADIOLOGY

## 2023-09-27 NOTE — PROGRESS NOTES
NURSING ASSESSMENT     Date: 9/27/2023        Patient Name: Christin Cash     YOB: 1960      Age:  61 y.o. MRN: [id number]       Chaperone [] Yes   [x] No      Advance Directives:   Do you currently have completed advance directives (living will)? [x] Yes   [] No         *If yes, please bring us a copy for your records. *If no, would you like info or assistance in completing advance directives (living will)? [] Yes   [x] No    Pain Score:   Pain Score (1-10): Mild   Pain Location: Joint pain on occasion since starting anazsrozole, improves with movement  Pain Duration: On occasion   Pain Management/Control: NA      Is pain affecting your ability to take care of yourself or move throughout your home? [] Yes   [x] No    General: Fatigue is gradually improving since finishing radiation therapy  Patient has gained weight [] Yes   [x] No  Patient has lost weight [] Yes   [x] No  How much weight in pounds and over what length of time:     Eyes (Ophthalmic): Wears glasses     Skin (Dermatological): No Problems     ENT: No Problems     Respiratory: No Problems     Cardiovascular: No Problems      Device   [] Yes   [x] No   Copy of Card Obtained [] Yes   [x] No    Gastrointestinal: No Problems    Genito-Urinary:    No Problems       Breast: Right breast has some mild dermatitis, still using mometasone and aquaphor BID     Musculoskeletal: Mild joint pain on occasion since starting anastrozole on 9/13/23. Does not need to use any medication. Neurological: No Problems      Hematological and Lymphatic: No Problems     Endocrine: Diabetes Mellitus and Thyroid Problems    Gyn History:   No issues    A 10-point review of systems  has been conducted and pertinent positives have been   recorded. All other review of systems are negative    Was the patient admitted during the course of treatment OR within 30 days of treatment?  No        Additional Comments: f/u with Dr. Jerrell Shi on

## 2023-09-28 NOTE — PROGRESS NOTES
[facility]           Radiation Oncology      [address] [facility], [address] [facility]        Yamil Benitezos: [phone]        F: [phone]       [facility]                   Dr. Daryl Lara MD PhD    FOLLOW-UP NOTE     Date of Service: 2023  Patient ID: Manuel Barker   : 1960  MRN: [id number]   Acct Number: [de-identified]       NAME:  Manuel Barker    :  1960 61 y.o. female     PCP: Eugenio Mcgovern MD    REFERRING PROVIDER: Isabela Greenberg    DIAGNOSIS:  1. Ductal carcinoma in situ (DCIS) of right breast        STAGING: Cancer Staging   Ductal carcinoma in situ (DCIS) of right breast  Staging form: Breast, AJCC 8th Edition  - Clinical: Stage 0 (cTis (DCIS), cN0, cM0, G2, ER+, DC+) - Signed by Jorge Villarreal MD on 2023      PRIOR TREATMENT: 4256 cGy in 16 fractions to the right breast followed by a sequential boost to the tumor bed to 1000 cGy in 4 fractions for cumulative dose to 5256 cGy. TIME SINCE TREATMENT: Approximately 1 month    RECENT HISTORY: Manuel Barker returns for quick check approximately 1 month status post completion of adjuvant radiation therapy for the above diagnosis. She notes that her skin is largely healed and she denies any overt issues other than some mild fatigue and joint discomfort with anastrozole. She is planning to go back to [facility] for several weeks in October and November. Past medical, surgical, social and family histories reviewed and updated as indicated. ALLERGIES:  Patient has no known allergies.        MEDICATIONS:   Current Outpatient Medications:     mometasone (ELOCON) 0.1 % cream, Apply topically twice daily with aquaphor to the skin of the right breast., Disp: 50 g, Rfl: 2    anastrozole (ARIMIDEX) 1 MG tablet, Take 1 tablet by mouth daily (Patient not taking: Reported on 8/10/2023), Disp: , Rfl:     meloxicam (MOBIC) 15 MG tablet, Take 1 tablet by mouth daily, Disp: , Rfl:     spironolactone (ALDACTONE) 25 MG

## 2023-09-29 ENCOUNTER — LAB (OUTPATIENT)
Dept: LAB | Facility: LAB | Age: 63
End: 2023-09-29
Payer: COMMERCIAL

## 2023-09-29 DIAGNOSIS — E11.9 TYPE 2 DIABETES MELLITUS WITHOUT COMPLICATION, WITHOUT LONG-TERM CURRENT USE OF INSULIN (MULTI): ICD-10-CM

## 2023-09-29 DIAGNOSIS — A53.0 POSITIVE RPR TEST: ICD-10-CM

## 2023-09-29 LAB
ALANINE AMINOTRANSFERASE (SGPT) (U/L) IN SER/PLAS: 21 U/L (ref 7–45)
ALBUMIN (G/DL) IN SER/PLAS: 4.2 G/DL (ref 3.4–5)
ALBUMIN (MG/L) IN URINE: <7 MG/L
ALBUMIN/CREATININE (UG/MG) IN URINE: NORMAL UG/MG CRT (ref 0–30)
ALKALINE PHOSPHATASE (U/L) IN SER/PLAS: 101 U/L (ref 33–136)
ANION GAP IN SER/PLAS: 10 MMOL/L (ref 10–20)
ASPARTATE AMINOTRANSFERASE (SGOT) (U/L) IN SER/PLAS: 22 U/L (ref 9–39)
BILIRUBIN TOTAL (MG/DL) IN SER/PLAS: 0.5 MG/DL (ref 0–1.2)
CALCIUM (MG/DL) IN SER/PLAS: 9.4 MG/DL (ref 8.6–10.3)
CARBON DIOXIDE, TOTAL (MMOL/L) IN SER/PLAS: 28 MMOL/L (ref 21–32)
CHLORIDE (MMOL/L) IN SER/PLAS: 105 MMOL/L (ref 98–107)
CREATININE (MG/DL) IN SER/PLAS: 0.61 MG/DL (ref 0.5–1.05)
CREATININE (MG/DL) IN URINE: 103 MG/DL (ref 20–320)
ESTIMATED AVERAGE GLUCOSE FOR HBA1C: 117 MG/DL
GFR FEMALE: >90 ML/MIN/1.73M2
GLUCOSE (MG/DL) IN SER/PLAS: 94 MG/DL (ref 74–99)
HEMOGLOBIN A1C/HEMOGLOBIN TOTAL IN BLOOD: 5.7 %
POTASSIUM (MMOL/L) IN SER/PLAS: 4.6 MMOL/L (ref 3.5–5.3)
PROTEIN TOTAL: 7.2 G/DL (ref 6.4–8.2)
SODIUM (MMOL/L) IN SER/PLAS: 138 MMOL/L (ref 136–145)
UREA NITROGEN (MG/DL) IN SER/PLAS: 20 MG/DL (ref 6–23)

## 2023-09-29 PROCEDURE — 82043 UR ALBUMIN QUANTITATIVE: CPT

## 2023-09-29 PROCEDURE — 36415 COLL VENOUS BLD VENIPUNCTURE: CPT

## 2023-09-29 PROCEDURE — 80053 COMPREHEN METABOLIC PANEL: CPT

## 2023-09-29 PROCEDURE — 86592 SYPHILIS TEST NON-TREP QUAL: CPT

## 2023-09-29 PROCEDURE — 83036 HEMOGLOBIN GLYCOSYLATED A1C: CPT

## 2023-09-29 PROCEDURE — 86593 SYPHILIS TEST NON-TREP QUANT: CPT

## 2023-09-29 PROCEDURE — 82570 ASSAY OF URINE CREATININE: CPT

## 2023-09-30 LAB
RPR MONITORING: REACTIVE
RPR TITER MONITORING: ABNORMAL

## 2023-10-04 PROBLEM — Z85.3 PERSONAL HISTORY OF MALIGNANT NEOPLASM OF BREAST: Status: ACTIVE | Noted: 2023-05-01

## 2023-10-04 PROBLEM — R25.1 TREMOR: Status: ACTIVE | Noted: 2023-05-01

## 2023-10-04 PROBLEM — D05.11 DUCTAL CARCINOMA IN SITU (DCIS) OF RIGHT BREAST: Status: ACTIVE | Noted: 2023-10-04

## 2023-10-04 PROBLEM — Z86.19 HISTORY OF SYPHILIS: Status: ACTIVE | Noted: 2023-10-04

## 2023-10-06 ENCOUNTER — OFFICE VISIT (OUTPATIENT)
Dept: PRIMARY CARE | Facility: CLINIC | Age: 63
End: 2023-10-06
Payer: COMMERCIAL

## 2023-10-06 ENCOUNTER — TELEPHONE (OUTPATIENT)
Dept: PRIMARY CARE | Facility: CLINIC | Age: 63
End: 2023-10-06

## 2023-10-06 VITALS
HEIGHT: 67 IN | HEART RATE: 72 BPM | WEIGHT: 235 LBS | TEMPERATURE: 97.2 F | SYSTOLIC BLOOD PRESSURE: 104 MMHG | OXYGEN SATURATION: 96 % | RESPIRATION RATE: 16 BRPM | BODY MASS INDEX: 36.88 KG/M2 | DIASTOLIC BLOOD PRESSURE: 60 MMHG

## 2023-10-06 DIAGNOSIS — I10 ESSENTIAL HYPERTENSION: ICD-10-CM

## 2023-10-06 DIAGNOSIS — E78.2 MIXED HYPERLIPIDEMIA: ICD-10-CM

## 2023-10-06 DIAGNOSIS — Z86.19 HISTORY OF SYPHILIS: ICD-10-CM

## 2023-10-06 DIAGNOSIS — Z23 NEED FOR VACCINATION: ICD-10-CM

## 2023-10-06 DIAGNOSIS — E03.9 BORDERLINE HYPOTHYROIDISM: ICD-10-CM

## 2023-10-06 DIAGNOSIS — G25.0 ESSENTIAL TREMOR: ICD-10-CM

## 2023-10-06 DIAGNOSIS — R30.0 DYSURIA: ICD-10-CM

## 2023-10-06 DIAGNOSIS — D05.11 DUCTAL CARCINOMA IN SITU (DCIS) OF RIGHT BREAST: ICD-10-CM

## 2023-10-06 DIAGNOSIS — E11.9 TYPE 2 DIABETES MELLITUS WITHOUT COMPLICATION, WITHOUT LONG-TERM CURRENT USE OF INSULIN (MULTI): ICD-10-CM

## 2023-10-06 DIAGNOSIS — C50.911 MALIGNANT NEOPLASM OF RIGHT FEMALE BREAST, UNSPECIFIED ESTROGEN RECEPTOR STATUS, UNSPECIFIED SITE OF BREAST (MULTI): Primary | ICD-10-CM

## 2023-10-06 DIAGNOSIS — R25.1 TREMOR: ICD-10-CM

## 2023-10-06 DIAGNOSIS — M17.0 PRIMARY OSTEOARTHRITIS OF BOTH KNEES: ICD-10-CM

## 2023-10-06 DIAGNOSIS — L71.9 ROSACEA: ICD-10-CM

## 2023-10-06 DIAGNOSIS — A53.0 POSITIVE RPR TEST: ICD-10-CM

## 2023-10-06 LAB
POC APPEARANCE, URINE: CLEAR
POC BILIRUBIN, URINE: NEGATIVE
POC BLOOD, URINE: NEGATIVE
POC COLOR, URINE: YELLOW
POC GLUCOSE, URINE: ABNORMAL MG/DL
POC KETONES, URINE: NEGATIVE MG/DL
POC LEUKOCYTES, URINE: NEGATIVE
POC NITRITE,URINE: NEGATIVE
POC PH, URINE: 5.5 PH
POC PROTEIN, URINE: NEGATIVE MG/DL
POC SPECIFIC GRAVITY, URINE: 1.02
POC UROBILINOGEN, URINE: 0.2 EU/DL

## 2023-10-06 PROCEDURE — 81003 URINALYSIS AUTO W/O SCOPE: CPT | Performed by: FAMILY MEDICINE

## 2023-10-06 PROCEDURE — 1036F TOBACCO NON-USER: CPT | Performed by: FAMILY MEDICINE

## 2023-10-06 PROCEDURE — 99214 OFFICE O/P EST MOD 30 MIN: CPT | Performed by: FAMILY MEDICINE

## 2023-10-06 PROCEDURE — 90686 IIV4 VACC NO PRSV 0.5 ML IM: CPT | Performed by: FAMILY MEDICINE

## 2023-10-06 PROCEDURE — 3044F HG A1C LEVEL LT 7.0%: CPT | Performed by: FAMILY MEDICINE

## 2023-10-06 PROCEDURE — 90471 IMMUNIZATION ADMIN: CPT | Performed by: FAMILY MEDICINE

## 2023-10-06 PROCEDURE — 3074F SYST BP LT 130 MM HG: CPT | Performed by: FAMILY MEDICINE

## 2023-10-06 PROCEDURE — 3078F DIAST BP <80 MM HG: CPT | Performed by: FAMILY MEDICINE

## 2023-10-06 RX ORDER — ANASTROZOLE 1 MG/1
1 TABLET ORAL DAILY
COMMUNITY
Start: 2023-09-13

## 2023-10-06 NOTE — PROGRESS NOTES
"Subjective   Patient ID: Emelina Powell is a 63 y.o. female who presents for Diabetes, Hypertension, and Hyperlipidemia.  Covid vax: x 5  CRC: 10/2022  Mammogram: 4/2023  Pap: due 2025  Lmp: n/a  HPI  Patient Active Problem List   Diagnosis    Borderline hypothyroidism    Essential hypertension    Essential tremor    HSV-1 (herpes simplex virus 1) infection    HSV-2 (herpes simplex virus 2) infection    Mixed hyperlipidemia    Primary osteoarthritis of both knees    Rosacea    Type 2 diabetes mellitus without complication, without long-term current use of insulin (CMS/HCC)    Tremor    Personal history of malignant neoplasm of breast    History of syphilis    Ductal carcinoma in situ (DCIS) of right breast    Malignant neoplasm of right female breast, unspecified estrogen receptor status, unspecified site of breast (CMS/HCC)       Past Surgical History:   Procedure Laterality Date    BREAST BIOPSY  06/22/2023    COLONOSCOPY  10/2020    no polyps    ESOPHAGOGASTRODUODENOSCOPY  10/2020    OTHER SURGICAL HISTORY  2018    Mohs surgery x 3    TOTAL KNEE ARTHROPLASTY Right 06/2019    WISDOM TOOTH EXTRACTION  1979     Hba1c 5.7    Review of Systems  This patient has   NO history of recent Covid nor flu symptoms,  NO Fever nor chills,  NO Chest pain, shortness of breath nor paroxysmal nocturnal dyspnea,  NO Nausea, vomiting, nor diarrhea,  NO Hematochezia nor melena,  NO Dysuria, hematuria, nor new incontinence issues  NO new severe headaches nor neurological complaints,  NO new issues with anxiety nor depression nor new psychiatric complaints,  NO suicidal nor homicidal ideations.     OBJECTIVE:  /60   Pulse 72   Temp 36.2 °C (97.2 °F) (Temporal)   Resp 16   Ht 1.702 m (5' 7\")   Wt 107 kg (235 lb)   LMP  (LMP Unknown)   SpO2 96%   BMI 36.81 kg/m²      General:  alert, oriented, no acute distress.  No obvious skin rashes noted.   No gait disturbance noted.    Mood is pleasant, not tearful, no signs of " emotional distress.  Not appearing intoxicated or altered.   No voiced delusions,   Normal, appropriate behavior.    HEENT: Normocephalic, atraumatic,   Pupils round, reactive to light  Extraocular motions intact and wnl  Tympanic membranes normal    Neck: no nuchal rigidity  No masses palpable.  No carotid bruits.  No thyromegaly.    Respiratory: Equal breath sounds  No wheezes,    rales,    nor rhonchi  No respiratory distress.    Heart: Regular rate and rhythm, no    murmurs  no rubs/gallops    Abdomen: no masses palpable, nontender, no rebound nor guarding.overwt    Extremities: NO cyanosis noted, no clubbing.   No edema noted.  2+dorsalis pedis pulses.    Normal-not antalgic, steady gait.    Lab on 09/29/2023   Component Date Value Ref Range Status    Glucose 09/29/2023 94  74 - 99 mg/dL Final    Sodium 09/29/2023 138  136 - 145 mmol/L Final    Potassium 09/29/2023 4.6  3.5 - 5.3 mmol/L Final    Chloride 09/29/2023 105  98 - 107 mmol/L Final    Bicarbonate 09/29/2023 28  21 - 32 mmol/L Final    Anion Gap 09/29/2023 10  10 - 20 mmol/L Final    Urea Nitrogen 09/29/2023 20  6 - 23 mg/dL Final    Creatinine 09/29/2023 0.61  0.50 - 1.05 mg/dL Final    GFR Female 09/29/2023 >90  >90 mL/min/1.73m2 Final     CALCULATIONS OF ESTIMATED GFR ARE PERFORMED   USING THE 2021 CKD-EPI STUDY REFIT EQUATION   WITHOUT THE RACE VARIABLE FOR THE IDMS-TRACEABLE   CREATININE METHODS.    https://jasn.asnjournals.org/content/early/2021/09/22/ASN.0100534278    Calcium 09/29/2023 9.4  8.6 - 10.3 mg/dL Final    Albumin 09/29/2023 4.2  3.4 - 5.0 g/dL Final    Alkaline Phosphatase 09/29/2023 101  33 - 136 U/L Final    Total Protein 09/29/2023 7.2  6.4 - 8.2 g/dL Final    AST 09/29/2023 22  9 - 39 U/L Final    Total Bilirubin 09/29/2023 0.5  0.0 - 1.2 mg/dL Final    ALT (SGPT) 09/29/2023 21  7 - 45 U/L Final     Patients treated with Sulfasalazine may generate    falsely decreased results for ALT.    Hemoglobin A1C 09/29/2023 5.7 (A)  % Final          Diagnosis of Diabetes-Adults   Non-Diabetic: < or = 5.6%   Increased risk for developing diabetes: 5.7-6.4%   Diagnostic of diabetes: > or = 6.5%  .       Monitoring of Diabetes                Age (y)     Therapeutic Goal (%)   Adults:          >18           <7.0   Pediatrics:    13-18           <7.5                   7-12           <8.0                   0- 6            7.5-8.5   American Diabetes Association. Diabetes Care 33(S1), Jan 2010.    Estimated Average Glucose 09/29/2023 117  MG/DL Final    RPR Monitoring   09/29/2023 REACTIVE (A)  NONREACTIVE Final    A greater than or equal to a 4 fold decrease in titer indicates response to   therapy. However, some patients may show persistent low RPR titer despite   adequate treatment (serofast).    RPR Titer Monitoring  09/29/2023 1:4   Final    ALBUMIN (MG/L) IN URINE 09/29/2023 <7.0  Not Established mg/L Final    Albumin/Creatine Ratio 09/29/2023 SEE COMMENT  0.0 - 30.0 ug/mg crt Final    One or more analytes used in this calculation   is outside of the analytical measurement range.  Calculation cannot be performed.    Creatinine, Urine 09/29/2023 103.0  20.0 - 320.0 mg/dL Final        Assessment/Plan     Problem List Items Addressed This Visit       Essential hypertension    Essential tremor    Mixed hyperlipidemia    Primary osteoarthritis of both knees    Rosacea    Type 2 diabetes mellitus without complication, without long-term current use of insulin (CMS/HCC)    Tremor    History of syphilis    Ductal carcinoma in situ (DCIS) of right breast    Malignant neoplasm of right female breast, unspecified estrogen receptor status, unspecified site of breast (CMS/HCC) - Primary     Other Visit Diagnoses       Need for vaccination        Relevant Orders    Flu vaccine (IIV4) age 6 months and greater, preservative free              Had radiation  Going to [place] later this mo  Mood is good   No hi/si  Mild r hip djd-worsened by arimidex  Mobic helps doesn't take  every day  This medications risks, benefits, and alternatives were discussed with patient at length.  If any unwanted side effects occur-discontinue medicine and call the office for discussion.  New covid and rsv advised  4-6mo hba1c  Defer to ID for advice re: repeating rpr monitoring and whether ratio is good at present or should be treated to better ratio  Also will adivse pt to get further advice confirming need for covid boost and rsv  And pt reports dysuria check ua

## 2023-10-25 ENCOUNTER — OFFICE VISIT (OUTPATIENT)
Dept: SURGERY | Age: 63
End: 2023-10-25
Payer: COMMERCIAL

## 2023-10-25 VITALS
WEIGHT: 238.8 LBS | HEART RATE: 84 BPM | RESPIRATION RATE: 16 BRPM | DIASTOLIC BLOOD PRESSURE: 78 MMHG | BODY MASS INDEX: 38.38 KG/M2 | HEIGHT: 66 IN | SYSTOLIC BLOOD PRESSURE: 132 MMHG | TEMPERATURE: 97.3 F | OXYGEN SATURATION: 97 %

## 2023-10-25 DIAGNOSIS — E66.9 OBESITY, CLASS II, BMI 35-39.9: ICD-10-CM

## 2023-10-25 DIAGNOSIS — D05.11 DUCTAL CARCINOMA IN SITU (DCIS) OF RIGHT BREAST: Primary | ICD-10-CM

## 2023-10-25 DIAGNOSIS — I89.0 LYMPHEDEMA OF BREAST: ICD-10-CM

## 2023-10-25 PROCEDURE — 3078F DIAST BP <80 MM HG: CPT | Performed by: SURGERY

## 2023-10-25 PROCEDURE — 99214 OFFICE O/P EST MOD 30 MIN: CPT | Performed by: SURGERY

## 2023-10-25 PROCEDURE — 3075F SYST BP GE 130 - 139MM HG: CPT | Performed by: SURGERY

## 2023-10-25 NOTE — PROGRESS NOTES
Patient approves a medical student to be in the room for exam.
6.5\")             Physical Exam  Vitals reviewed. Constitutional:       Appearance: Normal appearance. She is well-developed. HENT:      Head: Normocephalic and atraumatic. Nose: Nose normal.   Eyes:      Conjunctiva/sclera: Conjunctivae normal.      Right eye: No hemorrhage. Left eye: No hemorrhage. Cardiovascular:      Rate and Rhythm: Normal rate. Pulmonary:      Effort: Pulmonary effort is normal. No respiratory distress. Chest:   Breasts:     Breasts are symmetrical.      Right: Skin change (she has some swelling, likely breast lymphedema) present. No inverted nipple, mass, nipple discharge or tenderness. Left: No inverted nipple, mass, nipple discharge, skin change or tenderness. Musculoskeletal:         General: Normal range of motion. Cervical back: Normal range of motion and neck supple. Comments: Normal Range of motion in upper and lower extremities. Lymphadenopathy:      Cervical: No cervical adenopathy. Right cervical: No superficial, deep or posterior cervical adenopathy. Left cervical: No superficial, deep or posterior cervical adenopathy. Upper Body:      Right upper body: No supraclavicular, axillary or pectoral adenopathy. Left upper body: No supraclavicular, axillary or pectoral adenopathy. Skin:     General: Skin is warm and dry. Findings: No abrasion, bruising, erythema or lesion. Neurological:      Mental Status: She is alert and oriented to person, place, and time. She is not disoriented. Psychiatric:         Speech: Speech normal.         Behavior: Behavior normal. Behavior is cooperative. Thought Content: Thought content normal.         Judgment: Judgment normal.          IMAGING:     No results found. Assessment:       ICD-10-CM    1. Ductal carcinoma in situ (DCIS) of right breast  D05.11 [facility] [place]      2. Lymphedema of breast  I89.0 [facility] [place]      3.

## 2023-11-05 DIAGNOSIS — M25.551 RIGHT HIP PAIN: ICD-10-CM

## 2023-11-05 DIAGNOSIS — S46.011A STRAIN OF RIGHT ROTATOR CUFF CAPSULE, INITIAL ENCOUNTER: ICD-10-CM

## 2023-11-06 RX ORDER — MELOXICAM 15 MG/1
15 TABLET ORAL DAILY PRN
Qty: 30 TABLET | Refills: 3 | Status: SHIPPED | OUTPATIENT
Start: 2023-11-06 | End: 2024-04-08

## 2023-12-14 DIAGNOSIS — E78.2 MIXED HYPERLIPIDEMIA: ICD-10-CM

## 2023-12-15 DIAGNOSIS — Z85.3 PERSONAL HISTORY OF MALIGNANT NEOPLASM OF BREAST: ICD-10-CM

## 2023-12-15 DIAGNOSIS — R73.03 PREDIABETES: ICD-10-CM

## 2023-12-15 DIAGNOSIS — D05.11 DUCTAL CARCINOMA IN SITU (DCIS) OF RIGHT BREAST: ICD-10-CM

## 2023-12-15 DIAGNOSIS — I89.0 LYMPHEDEMA OF BREAST: Primary | ICD-10-CM

## 2023-12-15 RX ORDER — ATORVASTATIN CALCIUM 20 MG/1
TABLET, FILM COATED ORAL
Qty: 90 TABLET | Refills: 3 | Status: SHIPPED | OUTPATIENT
Start: 2023-12-15

## 2023-12-15 RX ORDER — EMPAGLIFLOZIN 10 MG/1
TABLET, FILM COATED ORAL
Qty: 90 TABLET | Refills: 1 | Status: SHIPPED | OUTPATIENT
Start: 2023-12-15

## 2023-12-15 NOTE — PROGRESS NOTES
Patient called the office for an updated PT order. She has been traveling and her PT order . The patient is requesting the [address] [facility]. location. The order was placed. The patient verbalized that she has the contact information to call and schedule her appointment. The patient has no further questions at this time.

## 2023-12-23 DIAGNOSIS — R73.03 PREDIABETES: ICD-10-CM

## 2023-12-24 DIAGNOSIS — E03.9 HYPOTHYROIDISM, UNSPECIFIED: ICD-10-CM

## 2023-12-26 ENCOUNTER — HOSPITAL ENCOUNTER (OUTPATIENT)
Dept: PHYSICAL THERAPY | Age: 63
Setting detail: THERAPIES SERIES
Discharge: HOME | End: 2023-12-26
Attending: SURGERY
Payer: COMMERCIAL

## 2023-12-26 PROCEDURE — 97162 PT EVAL MOD COMPLEX 30 MIN: CPT

## 2023-12-26 RX ORDER — LEVOTHYROXINE SODIUM 25 UG/1
TABLET ORAL
Qty: 30 TABLET | Refills: 0 | Status: SHIPPED | OUTPATIENT
Start: 2023-12-26 | End: 2024-01-17

## 2023-12-26 RX ORDER — METFORMIN HYDROCHLORIDE 500 MG/1
500 TABLET ORAL NIGHTLY
Qty: 30 TABLET | Refills: 0 | Status: SHIPPED | OUTPATIENT
Start: 2023-12-26 | End: 2024-01-17

## 2023-12-26 NOTE — PROGRESS NOTES
15 cm below 63 63.5     Outcomes Score:   LLIS = 12      Treatment:    Exercises:   Exercises  Exercise 1: Lymphedema massage to Rt breast*  *Indicates exercise, modality, or manual techniques to be initiated when appropriate       ASSESSMENT     Impression: Assessment: Pt presents with ongoing edema in Rt breast causing discomfort at times with movement. Pt demonstrates normal ROM and strength. Pt with no fibrosis or skin changes to her Rt breast.  Pt with increased visible edema in Rt breast but does not demonstrate significant differences with measurements when comparing to the Lt. Discussed pushing down when massaging near Rt breast to reduce edema. Also discussed bra wear for compression to help manage edema symptoms. Pt would benefit from further skilled PT to improve her edema and increase her QOL. Body Structures, Functions, Activity Limitations Requiring Skilled Therapeutic Intervention:  (Rt breast edema)    Statement of Medical Necessity: Physical Therapy is both indicated and medically necessary as outlined in the POC to increase the likelihood of meeting the functionally related goals stated below. Patient's Activity Tolerance: Patient tolerated evaluation without incident      Patient's rehabilitation potential/prognosis is considered to be: Good    Factors which may impact rehabilitation potential include: None     Patient Education: Goals, PT Role, Plan of Care, Evaluative findings, Home Exercise Program      GOALS   Patient Goal(s): Patient Goals : To get rid of swelling and pain    Long Term Goals Completed by 4 weeks Goal Status   Reduce Rt breast edema by >/= 1-2 cm to improve comfort and QOL. New   Pt will be independent with home management of lymphedema symptoms.  New        TREATMENT PLAN       Requires PT Follow-Up: Yes    Treatment may include any combination of the following: Strengthening, ROM, Manual lymphatic drainage, Manual, Home exercise program, Safety education &

## 2023-12-26 NOTE — THERAPY EVALUATION
[id number] [facility]     Ph: [phone]  Fax: [phone]      [x] Certification  [] Recertification []  Plan of Care  [] Progress Note [] Discharge      Referring Provider: Ten Muse MD     From:  Jossie Plummer, PT  Patient: Juan López (61 y.o. female) : 1960 Date: 2023  Medical Diagnosis: Lymphedema of breast [I89.0]  Ductal carcinoma in situ (DCIS) of right breast [D05.11]  Personal history of malignant neoplasm of breast [Z85.3]       Treatment Diagnosis: Rt breast edema    Progress Report Period from:  2023  to 2023    Visits to Date: 1 No Show: 0 Cancelled Appts: 0    OBJECTIVE:   Long Term Goals - Time Frame for Long Term Goals : 4 weeks  Goals Current/ Discharge status Status   Long Term Goal 1: Reduce Rt breast edema by >/= 1-2 cm to improve comfort and QOL. Increased edema in Rt breast New   Long Term Goal 2: Pt will be independent with home management of lymphedema symptoms. ongoing New       Body Structures, Functions, Activity Limitations Requiring Skilled Therapeutic Intervention:  (Rt breast edema)  Assessment: Pt presents with ongoing edema in Rt breast causing discomfort at times with movement. Pt demonstrates normal ROM and strength. Pt with no fibrosis or skin changes to her Rt breast.  Pt with increased visible edema in Rt breast but does not demonstrate significant differences with measurements when comparing to the Lt. Discussed pushing down when massaging near Rt breast to reduce edema. Also discussed bra wear for compression to help manage edema symptoms. Pt would benefit from further skilled PT to improve her edema and increase her QOL.   Therapy Prognosis: Good      PT Education: Goals;PT Role;Plan of Care;Evaluative findings;Home Exercise Program    PLAN: [x] Evaluate and Treat  Frequency/Duration:  Plan Frequency: 1-2  Plan weeks: 4  Current

## 2024-01-02 ENCOUNTER — OFFICE VISIT (OUTPATIENT)
Dept: NEUROLOGY | Facility: CLINIC | Age: 64
End: 2024-01-02
Payer: COMMERCIAL

## 2024-01-02 ENCOUNTER — HOSPITAL ENCOUNTER (OUTPATIENT)
Dept: PHYSICAL THERAPY | Age: 64
Setting detail: THERAPIES SERIES
Discharge: HOME | End: 2024-01-02
Attending: SURGERY
Payer: COMMERCIAL

## 2024-01-02 ENCOUNTER — TELEPHONE (OUTPATIENT)
Dept: INFECTIOUS DISEASES | Age: 64
End: 2024-01-02

## 2024-01-02 VITALS
HEART RATE: 86 BPM | DIASTOLIC BLOOD PRESSURE: 84 MMHG | WEIGHT: 232.9 LBS | HEIGHT: 66 IN | SYSTOLIC BLOOD PRESSURE: 118 MMHG | BODY MASS INDEX: 37.43 KG/M2

## 2024-01-02 DIAGNOSIS — R25.1 TREMOR: Primary | ICD-10-CM

## 2024-01-02 PROCEDURE — 97140 MANUAL THERAPY 1/> REGIONS: CPT

## 2024-01-02 PROCEDURE — 97110 THERAPEUTIC EXERCISES: CPT

## 2024-01-02 PROCEDURE — 3074F SYST BP LT 130 MM HG: CPT | Performed by: PSYCHIATRY & NEUROLOGY

## 2024-01-02 PROCEDURE — 99203 OFFICE O/P NEW LOW 30 MIN: CPT | Performed by: PSYCHIATRY & NEUROLOGY

## 2024-01-02 PROCEDURE — 1036F TOBACCO NON-USER: CPT | Performed by: PSYCHIATRY & NEUROLOGY

## 2024-01-02 PROCEDURE — 3079F DIAST BP 80-89 MM HG: CPT | Performed by: PSYCHIATRY & NEUROLOGY

## 2024-01-02 RX ORDER — METHYLPREDNISOLONE 4 MG/1
4 TABLET ORAL ONCE
COMMUNITY
Start: 2023-12-29 | End: 2024-01-04

## 2024-01-02 ASSESSMENT — UNIFIED PARKINSONS DISEASE RATING SCALE (UPDRS)
KINETIC_TREMOR_RIGHTHAND: 0
RIGIDITY_LLE: 0
FACIAL_EXPRESSION: 0
POSTURAL_TREMOR_LEFTHAND: 0
PRONATION_SUPINATION_RIGHT: 0
AMPLITUDE_RLE: 0
LEG_AGILITY_RIGHT: 0
HANDMOVEMENTS_RIGHT: 0
LEG_AGILITY_LEFT: 1
TOETAPPING_RIGHT: 0
RIGIDITY_NECK: 0
AMPLITUDE_RUE: 1
FINGER_TAPPING_RIGHT: 0
RIGIDITY_LUE: 0
GAIT: 0
AMPLITUDE_LIP_JAW: 0
POSTURAL_STABILITY: 0
AMPLITUDE_LUE: 0
RIGIDITY_RUE: 0
POSTURE: 0
FINGER_TAPPING_LEFT: 0
AMPLITUDE_LLE: 0
KINETIC_TREMOR_LEFTHAND: 0
FREEZING_GAIT: 0
TOTAL_SCORE: 4
LEVODOPA: NO
SPEECH: 0
DYSKINESIAS_PRESENT: NO
RIGIDITY_RLE: 0
PRONATION_SUPINATION_LEFT: 0
TOETAPPING_LEFT: 1
CONSTANCY_TREMOR_ATREST: 1
CHAIR_RISING_SCALE: 0
SPONTANEITY_OF_MOVEMENT: 0
PARKINSONS_MEDS: NO
POSTURAL_TREMOR_RIGHTHAND: 0

## 2024-01-02 ASSESSMENT — ANXIETY QUESTIONNAIRES
3. WORRYING TOO MUCH ABOUT DIFFERENT THINGS: NOT AT ALL
6. BECOMING EASILY ANNOYED OR IRRITABLE: NOT AT ALL
7. FEELING AFRAID AS IF SOMETHING AWFUL MIGHT HAPPEN: NOT AT ALL
5. BEING SO RESTLESS THAT IT IS HARD TO SIT STILL: NOT AT ALL
4. TROUBLE RELAXING: NOT AT ALL
1. FEELING NERVOUS, ANXIOUS, OR ON EDGE: NOT AT ALL
GAD7 TOTAL SCORE: 0
IF YOU CHECKED OFF ANY PROBLEMS ON THIS QUESTIONNAIRE, HOW DIFFICULT HAVE THESE PROBLEMS MADE IT FOR YOU TO DO YOUR WORK, TAKE CARE OF THINGS AT HOME, OR GET ALONG WITH OTHER PEOPLE: NOT DIFFICULT AT ALL
2. NOT BEING ABLE TO STOP OR CONTROL WORRYING: NOT AT ALL

## 2024-01-02 ASSESSMENT — ENCOUNTER SYMPTOMS
LOSS OF SENSATION IN FEET: 0
DEPRESSION: 0
OCCASIONAL FEELINGS OF UNSTEADINESS: 0

## 2024-01-02 ASSESSMENT — PATIENT HEALTH QUESTIONNAIRE - PHQ9
2. FEELING DOWN, DEPRESSED OR HOPELESS: NOT AT ALL
1. LITTLE INTEREST OR PLEASURE IN DOING THINGS: NOT AT ALL
SUM OF ALL RESPONSES TO PHQ9 QUESTIONS 1 AND 2: 0

## 2024-01-02 NOTE — TELEPHONE ENCOUNTER
Received referral for + RPR  Spoke with pt.   States she was treated when she was teenager.   Titer in Jan 2023 1:1 - states she was treated with 3 inj of PCN at that time.    43 years  Denies risk factors otherwise. Denies rash.   Pt out of town the 1st 2 weeks of feb. Prefers to be seen by Dr Bates. This RN offered first available but pt preferred to see Dr Bates.   All office information reviewed. Requested she call office if needed.

## 2024-01-02 NOTE — PROGRESS NOTES
POC.  Treatment Diagnosis: Rt breast edema  Therapy Prognosis: Good          Post-Pain Assessment:       Pain Rating (0-10 pain scale):  0 /10   Location and pain description same as pre-treatment unless indicated.   Action: [x] NA   [] Perform HEP  [] Meds as prescribed  [] Modalities as prescribed   [] Call Physician     GOALS   Patient Goal(s): Patient Goals : To get rid of swelling and pain    Long Term Goals Completed by 4 weeks Goal Status   LTG 1 Reduce Rt breast edema by >/= 1-2 cm to improve comfort and QOL. In progress   LTG 2 Pt will be independent with home management of lymphedema symptoms. In progress   Plan:  Frequency/Duration:  Plan  Plan Frequency: 1-2  Plan weeks: 4  Current Treatment Recommendations: Strengthening, ROM, Manual lymphatic drainage, Manual, Home exercise program, Safety education & training, Patient/Caregiver education & training, Equipment evaluation, education, & procurement  Pt to continue current HEP.  See objective section for any therapeutic exercise changes, additions or modifications this date.    Therapy Time:      PT Individual Minutes  Time In: 1505  Time Out: 1529  Minutes: 24  Timed Code Treatment Minutes: 24 Minutes  Procedure Minutes:0  Timed Activity Minutes Units   Ther Ex 5 1   Manual  19 1     Electronically signed by Darlyn Leong PTA on 1/2/24 at 3:41 PM EST

## 2024-01-02 NOTE — LETTER
January 3, 2024     Destiny Almendarez MD  [address] [facility] [address]    Patient: Emelina Powell   YOB: 1960   Date of Visit: 1/2/2024       Dear Dr. Destiny Almendarez MD:    Thank you for referring Emelina Powell to me for evaluation. Below are my notes for this consultation.  If you have questions, please do not hesitate to call me. I look forward to following your patient along with you.       Sincerely,     Yvonne Bai MD      CC: No Recipients  ______________________________________________________________________________________    Subjective  Emelina Powell is a right handed  63 y.o. year old female who presents with tremor follow-up.   Visit type: new patient visit     Mrs Powell first noted symptoms in R hand on the consisting of resting tremor, initially when holding the steering wheel which started 8 years ago. Gradually progress to interrupt other activities like holding a book, working out etc. Since last visit, patient reported that the tremor has progressed to involve arm up the elbow. She reported that the tremor is most noticeable while holding items. Also endorsed worsening tremor when tired or stressed. Patient notices the tremors 3-4 times weekly.    She was seen for a virtual NPV 3 years ago and has not been seen since.     Review of Motor symptoms:  Tremor:  Location- RUE                 Rest/postural/action- postural  Stiffness/rigidity: denied  Slowness: denied  Trouble walking: denied  Freezing of gait: denied  Balance problems: R groin pain affecting balance  Falls: denied  Changes in speech: denied  Swallowing difficulties: denied  Abnormal postures: denied  Handwriting: occasional loss of control  Activities of daily living (buttoning clothes, bathing, cutting food, etc):  unimpaired    Review of Non-Motor symptoms:  Cognition:  Memory- mild concern (e.g. forgets appointments)         Hallucinations- denied           Mood:         Depression-  denied                       Anxiety: denied                       Obsessive/compulsive behaviors- denied  Sleep disturbances including REM behavior disorder: denied  Sensory changes (ie, smell or taste): denied  Cramps/pain: denied  Gastrointestinal complaints/Constipation: denied  Urinary retention or frequency: denied  Positional lightheadedness and/or syncope: denied  Excessive saliva/drooling: denied  Fatigue: denied    Medication History:  No medications for tremor    ROS unremarkable unless noted above.    Family history  PD (mother)    Social history  Remote smoking history. Denied alcohol use. Denied illicit substance use.    Patient Active Problem List   Diagnosis   • Borderline hypothyroidism   • Essential hypertension   • Essential tremor   • HSV-1 (herpes simplex virus 1) infection   • HSV-2 (herpes simplex virus 2) infection   • Mixed hyperlipidemia   • Primary osteoarthritis of both knees   • Rosacea   • Type 2 diabetes mellitus without complication, without long-term current use of insulin (CMS/HCC)   • Tremor   • Personal history of malignant neoplasm of breast   • History of syphilis   • Ductal carcinoma in situ (DCIS) of right breast   • Malignant neoplasm of right female breast, unspecified estrogen receptor status, unspecified site of breast (CMS/HCC)      Past Medical History:   Diagnosis Date   • Basal cell carcinoma 04/14/2023   • History of abnormal mammogram 04/2023    right cat 4-had bx-DCIS   • History of mammogram 03/2022    cat 1   • Pap test, as part of routine gynecological examination 03/2017    wnl, hpv neg-2022 gyn stated pap due 2025   • Syphilis    • Tick bite       Past Surgical History:   Procedure Laterality Date   • BI STEREOTACTIC GUIDED BREAST RIGHT LOCALIZATION AND BIOPSY Right 4/13/2023    BI STEREOTACTIC GUIDED BREAST RIGHT LOCALIZATION AND BIOPSY 4/13/2023   • BREAST BIOPSY  06/22/2023   • COLONOSCOPY  10/2020    no polyps   • ESOPHAGOGASTRODUODENOSCOPY   10/2020   • OTHER SURGICAL HISTORY  2018    Mohs surgery x 3   • TOTAL KNEE ARTHROPLASTY Right 06/2019   • WISDOM TOOTH EXTRACTION  1979      Social History     Socioeconomic History   • Marital status:      Spouse name: Not on file   • Number of children: Not on file   • Years of education: Not on file   • Highest education level: Not on file   Occupational History   • Not on file   Tobacco Use   • Smoking status: Never   • Smokeless tobacco: Never   Substance and Sexual Activity   • Alcohol use: Never   • Drug use: Never   • Sexual activity: Not on file   Other Topics Concern   • Not on file   Social History Narrative   • Not on file     Social Determinants of Health     Financial Resource Strain: Not on file   Food Insecurity: Not on file   Transportation Needs: Not on file   Physical Activity: Not on file   Stress: Not on file   Social Connections: Not on file   Intimate Partner Violence: Not on file   Housing Stability: Not on file      Objective  Vitals:    01/02/24 1159   BP: 118/84   Pulse: 86     Neurological Exam:  MENTAL STATUS:  General appearance: alert, NAD  Orientation: person, place, time  Language: Expression, repetition, naming, comprehension intact  Follows complex commands across midline    CRANIAL NERVES:  - II:  Visual fields intact to confrontation bilaterally  - III, IV, VI: PETEY, EOMI to pursuit without nystagmus  - V: V1-V3 sensation intact bilaterally  - VII: Face muscles symmetric with smile and eye closure  - VIII: Intact to finger rub bilaterally  - IX, X: Palate elevated symmetrically bilaterally, no hoarseness  - XI: 5/5 strength on shoulder shrugging bilaterally  - XII: Tongue midline without atrophy or fasciculation    MOTOR: Tone and bulk normal in all extremities. Mild RUE tremor only noted when ambulating and was not reproducible on subsequent ambulation.    STRENGTH: R L  Deltoid  5 5  Biceps  5 5  Triceps  5 5    5 5    Hip  flexion 5 5  Quadriceps 5 5  Hamstrings 5 5  DorsiFlex 5           5  PlantarFlex 5 5    REFLEXES:  R  L  Biceps   2+ 2+  Triceps   1+ 1+ (limited by body habitus)  Patellar   2+ 2+    COORDINATION: Intact on finger to nose bl, intact on heel to shin bl, MARIELENA intact bl    SENSORY: Intact to light touch in bl UE and LE    GAIT: Normal standard gait     Archimedes spiral and handwriting were both within normal limits.    MDS UPDRS 1st Score: Motor Examination  Is the patient on medication for treating the symptoms of Parkinson's Disease?: No  Is the patient on Levodopa?: No  Speech: 0  Facial Expression: 0  Rigidty Neck: 0  Rigidty RUE: 0  Rigidity - LUE: 0  Rigidity RLE: 0  Rigidity LLE: 0  Finger Tapping Right Hand: 0  Finger Tapping Left Hand: 0  Hand Movements- Right Hand: 0  Hand Movements- Left Hand: 0  Pronatiaon-Supination Movments - Right Hand: 0  Pronatiaon-Supination Movments Left Hand: 0  Toe Tapping Right Foot: 0  Toe Tapping - Left Foot: 1  Leg Agility - Right Le  Leg Agility - Left le  Arising from Chair: 0  Gait: 0  Freezing of Gait: 0  Postural Stability: 0  Posture: 0  Global Spontanteity of Movment ( Body Bradykinesia): 0  Postural Tremor - Right Hand: 0  Postural Tremor - Left hand: 0  Kinetic Tremor - Right hand: 0  Kinetic Tremor - Left hand: 0  Rest Tremor Amplitude - RUE: 1  Rest Tremor Amplitude - LUE: 0  Rest Tremor Amplitude - RLE: 0  Rest Tremor Amplitude - LLE: 0  Rest Tremor Amplitude - Lip/Jaw: 0  Constancy of Rest Tremor: 1  MDS UPDRS Total Score: 4  Were dyskinesias (chorea or dystonia) present during examination?: No    Hemoglobin A1C   Date Value Ref Range Status   2023 5.7 (A) % Final     Comment:          Diagnosis of Diabetes-Adults   Non-Diabetic: < or = 5.6%   Increased risk for developing diabetes: 5.7-6.4%   Diagnostic of diabetes: > or = 6.5%  .       Monitoring of Diabetes                Age (y)     Therapeutic Goal (%)   Adults:          >18           <7.0    Pediatrics:    13-18           <7.5                   7-12           <8.0                   0- 6            7.5-8.5   American Diabetes Association. Diabetes Care 33(S1), Jan 2010.   06/16/2023 5.8 4.8 - 5.9 % Final   06/13/2022 5.9 (H) 4.3 - 5.6 % Final     Comment:     American Diabetes Association guidelines indicate that patients with HgbA1c in the range 5.7-6.4% are at increased risk for development of diabetes, and intervention by lifestyle modification may be beneficial. HgbA1c greater or equal to 6.5% is considered diagnostic of diabetes.     Estimated Average Glucose   Date Value Ref Range Status   09/29/2023 117 MG/DL Final   06/13/2022 123 mg/dL Final     Comment:     eAG: (Estimated average glucose) is a calculated value from HgbA1c and is representative of the average blood glucose level in the last 2-3 month period.     TSH   Date Value Ref Range Status   07/06/2023 2.11 0.44 - 3.98 mIU/L Final     Comment:      TSH testing is performed using different testing    methodology at [facility] than at other    [facility] hospitals. Direct result comparisons should    only be made within the same method.     Assessment/Plan  Emelina Powell is a right handed 63 y.o. year old female who presents with tremor follow-up. RUE tremor appears unchanged from prior without interval development of other parkinsonian features. Given duration of symptoms (>5 years), lack of progression atypical for PD--however, we will continue to monitor.    - counseled on regular exercise  - RTC 12 months for repeat exam  - can consider Ania scan at that time if remains unclear.     Patient seen and discussed with attending physician, Dr. Richardson Mascorro Emblem  PGY-3 Neurology    I saw and examined the patient and agree with the current assessment and plan. Does not meet criteria for Dx of parkinsonism at this time - however has a + family Hx of same in first degree relative, and has a resting tremor -so will continue to  follow. Exercise with cardio was emphasized as possible neuroprotective strategy.     For the Evaluation and Management of this patient, the level of Medical Decision Making for this visit was determined based on the following:    The level of COMPLEXITY AND NUMBER OF PROBLEMS ADDRESSED was [, MODERATE,] as determined by:     MODERATE:  undiagnosed new problem with uncertain prognosis.    The AMOUNT/COMPLEXITY OF DATA TO REVIEW (reviewed, ordered or call for) was [LIMITED] as determined by:      LIMITED:  my review of prior external notes from a unique source.      The level of RISK OF COMPLICATIONS was [, LOW] as determined by:    LOW:  A low risk of morbidity from additional diagnostic testing or treatment.      Thus, the level of medical decision making (based on the lower of the two highest elements) was determined to be  LOW]. Therefore the appropriate E/M code for this encounter is [10152/

## 2024-01-02 NOTE — PROGRESS NOTES
Subjective   Emelina Powell is a right handed  63 y.o. year old female who presents with tremor follow-up.   Visit type: new patient visit     Mrs Powell first noted symptoms in R hand on the consisting of resting tremor, initially when holding the steering wheel which started 8 years ago. Gradually progress to interrupt other activities like holding a book, working out etc. Since last visit, patient reported that the tremor has progressed to involve arm up the elbow. She reported that the tremor is most noticeable while holding items. Also endorsed worsening tremor when tired or stressed. Patient notices the tremors 3-4 times weekly.    She was seen for a virtual NPV 3 years ago and has not been seen since.     Review of Motor symptoms:  Tremor:  Location- RUE                 Rest/postural/action- postural  Stiffness/rigidity: denied  Slowness: denied  Trouble walking: denied  Freezing of gait: denied  Balance problems: R groin pain affecting balance  Falls: denied  Changes in speech: denied  Swallowing difficulties: denied  Abnormal postures: denied  Handwriting: occasional loss of control  Activities of daily living (buttoning clothes, bathing, cutting food, etc):  unimpaired    Review of Non-Motor symptoms:  Cognition:  Memory- mild concern (e.g. forgets appointments)         Hallucinations- denied           Mood:        Depression-  denied                       Anxiety: denied                       Obsessive/compulsive behaviors- denied  Sleep disturbances including REM behavior disorder: denied  Sensory changes (ie, smell or taste): denied  Cramps/pain: denied  Gastrointestinal complaints/Constipation: denied  Urinary retention or frequency: denied  Positional lightheadedness and/or syncope: denied  Excessive saliva/drooling: denied  Fatigue: denied    Medication History:  No medications for tremor    ROS unremarkable unless noted above.    Family history  PD (mother)    Social history  Remote smoking  history. Denied alcohol use. Denied illicit substance use.    Patient Active Problem List   Diagnosis    Borderline hypothyroidism    Essential hypertension    Essential tremor    HSV-1 (herpes simplex virus 1) infection    HSV-2 (herpes simplex virus 2) infection    Mixed hyperlipidemia    Primary osteoarthritis of both knees    Rosacea    Type 2 diabetes mellitus without complication, without long-term current use of insulin (CMS/HCC)    Tremor    Personal history of malignant neoplasm of breast    History of syphilis    Ductal carcinoma in situ (DCIS) of right breast    Malignant neoplasm of right female breast, unspecified estrogen receptor status, unspecified site of breast (CMS/HCC)      Past Medical History:   Diagnosis Date    Basal cell carcinoma 04/14/2023    History of abnormal mammogram 04/2023    right cat 4-had bx-DCIS    History of mammogram 03/2022    cat 1    Pap test, as part of routine gynecological examination 03/2017    wnl, hpv neg-2022 gyn stated pap due 2025    Syphilis     Tick bite       Past Surgical History:   Procedure Laterality Date    BI STEREOTACTIC GUIDED BREAST RIGHT LOCALIZATION AND BIOPSY Right 4/13/2023    BI STEREOTACTIC GUIDED BREAST RIGHT LOCALIZATION AND BIOPSY 4/13/2023    BREAST BIOPSY  06/22/2023    COLONOSCOPY  10/2020    no polyps    ESOPHAGOGASTRODUODENOSCOPY  10/2020    OTHER SURGICAL HISTORY  2018    Mohs surgery x 3    TOTAL KNEE ARTHROPLASTY Right 06/2019    WISDOM TOOTH EXTRACTION  1979      Social History     Socioeconomic History    Marital status:      Spouse name: Not on file    Number of children: Not on file    Years of education: Not on file    Highest education level: Not on file   Occupational History    Not on file   Tobacco Use    Smoking status: Never    Smokeless tobacco: Never   Substance and Sexual Activity    Alcohol use: Never    Drug use: Never    Sexual activity: Not on file   Other Topics Concern    Not on file   Social History Narrative     Not on file     Social Determinants of Health     Financial Resource Strain: Not on file   Food Insecurity: Not on file   Transportation Needs: Not on file   Physical Activity: Not on file   Stress: Not on file   Social Connections: Not on file   Intimate Partner Violence: Not on file   Housing Stability: Not on file      Objective   Vitals:    01/02/24 1159   BP: 118/84   Pulse: 86     Neurological Exam:  MENTAL STATUS:  General appearance: alert, NAD  Orientation: person, place, time  Language: Expression, repetition, naming, comprehension intact  Follows complex commands across midline    CRANIAL NERVES:  - II:  Visual fields intact to confrontation bilaterally  - III, IV, VI: PETEY, EOMI to pursuit without nystagmus  - V: V1-V3 sensation intact bilaterally  - VII: Face muscles symmetric with smile and eye closure  - VIII: Intact to finger rub bilaterally  - IX, X: Palate elevated symmetrically bilaterally, no hoarseness  - XI: 5/5 strength on shoulder shrugging bilaterally  - XII: Tongue midline without atrophy or fasciculation    MOTOR: Tone and bulk normal in all extremities. Mild RUE tremor only noted when ambulating and was not reproducible on subsequent ambulation.    STRENGTH: R L  Deltoid  5 5  Biceps  5 5  Triceps  5 5    5 5    Hip flexion 5 5  Quadriceps 5 5  Hamstrings 5 5  DorsiFlex 5           5  PlantarFlex 5 5    REFLEXES:  R  L  Biceps   2+ 2+  Triceps   1+ 1+ (limited by body habitus)  Patellar   2+ 2+    COORDINATION: Intact on finger to nose bl, intact on heel to shin bl, MARIELENA intact bl    SENSORY: Intact to light touch in bl UE and LE    GAIT: Normal standard gait     Archimedes spiral and handwriting were both within normal limits.    MDS UPDRS 1st Score: Motor Examination  Is the patient on medication for treating the symptoms of Parkinson's Disease?: No  Is the patient on Levodopa?: No  Speech: 0  Facial Expression: 0  Rigidty Neck: 0  Rigidty RUE: 0  Rigidity - LUE: 0  Rigidity RLE:  0  Rigidity LLE: 0  Finger Tapping Right Hand: 0  Finger Tapping Left Hand: 0  Hand Movements- Right Hand: 0  Hand Movements- Left Hand: 0  Pronatiaon-Supination Movments - Right Hand: 0  Pronatiaon-Supination Movments Left Hand: 0  Toe Tapping Right Foot: 0  Toe Tapping - Left Foot: 1  Leg Agility - Right Le  Leg Agility - Left le  Arising from Chair: 0  Gait: 0  Freezing of Gait: 0  Postural Stability: 0  Posture: 0  Global Spontanteity of Movment ( Body Bradykinesia): 0  Postural Tremor - Right Hand: 0  Postural Tremor - Left hand: 0  Kinetic Tremor - Right hand: 0  Kinetic Tremor - Left hand: 0  Rest Tremor Amplitude - RUE: 1  Rest Tremor Amplitude - LUE: 0  Rest Tremor Amplitude - RLE: 0  Rest Tremor Amplitude - LLE: 0  Rest Tremor Amplitude - Lip/Jaw: 0  Constancy of Rest Tremor: 1  MDS UPDRS Total Score: 4  Were dyskinesias (chorea or dystonia) present during examination?: No    Hemoglobin A1C   Date Value Ref Range Status   2023 5.7 (A) % Final     Comment:          Diagnosis of Diabetes-Adults   Non-Diabetic: < or = 5.6%   Increased risk for developing diabetes: 5.7-6.4%   Diagnostic of diabetes: > or = 6.5%  .       Monitoring of Diabetes                Age (y)     Therapeutic Goal (%)   Adults:          >18           <7.0   Pediatrics:    13-18           <7.5                   7-12           <8.0                   0- 6            7.5-8.5   American Diabetes Association. Diabetes Care 33(S1), 2010.   2023 5.8 4.8 - 5.9 % Final   2022 5.9 (H) 4.3 - 5.6 % Final     Comment:     American Diabetes Association guidelines indicate that patients with HgbA1c in the range 5.7-6.4% are at increased risk for development of diabetes, and intervention by lifestyle modification may be beneficial. HgbA1c greater or equal to 6.5% is considered diagnostic of diabetes.     Estimated Average Glucose   Date Value Ref Range Status   2023 117 MG/DL Final   2022 123 mg/dL Final      Comment:     eAG: (Estimated average glucose) is a calculated value from HgbA1c and is representative of the average blood glucose level in the last 2-3 month period.     TSH   Date Value Ref Range Status   07/06/2023 2.11 0.44 - 3.98 mIU/L Final     Comment:      TSH testing is performed using different testing    methodology at [facility] than at other    [facility] hospitals. Direct result comparisons should    only be made within the same method.     Assessment/Plan   Emelina Powell is a right handed 63 y.o. year old female who presents with tremor follow-up. RUE tremor appears unchanged from prior without interval development of other parkinsonian features. Given duration of symptoms (>5 years), lack of progression atypical for PD--however, we will continue to monitor.    - counseled on regular exercise  - RTC 12 months for repeat exam  - can consider Ania scan at that time if remains unclear.     Patient seen and discussed with attending physician, Dr. Richardson Mascorro Hamel  PGY-3 Neurology    I saw and examined the patient and agree with the current assessment and plan. Does not meet criteria for Dx of parkinsonism at this time - however has a + family Hx of same in first degree relative, and has a resting tremor -so will continue to follow. Exercise with cardio was emphasized as possible neuroprotective strategy.     For the Evaluation and Management of this patient, the level of Medical Decision Making for this visit was determined based on the following:    The level of COMPLEXITY AND NUMBER OF PROBLEMS ADDRESSED was [, MODERATE,] as determined by:     MODERATE:  undiagnosed new problem with uncertain prognosis.    The AMOUNT/COMPLEXITY OF DATA TO REVIEW (reviewed, ordered or call for) was [LIMITED] as determined by:      LIMITED:  my review of prior external notes from a unique source.      The level of RISK OF COMPLICATIONS was [, LOW] as determined by:    LOW:  A low risk of morbidity from  additional diagnostic testing or treatment.      Thus, the level of medical decision making (based on the lower of the two highest elements) was determined to be  LOW]. Therefore the appropriate E/M code for this encounter is [31713/

## 2024-01-02 NOTE — PATIENT INSTRUCTIONS
Ms. Powell,    You were seen for tremor evaluation. Your exam is not concerning for Parkinson's disease. Please contact us at [phone] if you have any questions or concerns. Please return to clinic in 1 year.

## 2024-01-03 ENCOUNTER — HOSPITAL ENCOUNTER (OUTPATIENT)
Dept: WOMENS IMAGING | Age: 64
Discharge: HOME | End: 2024-01-05
Attending: SURGERY
Payer: COMMERCIAL

## 2024-01-03 VITALS — BODY MASS INDEX: 37.05 KG/M2 | WEIGHT: 233 LBS

## 2024-01-03 DIAGNOSIS — N64.9 BREAST DISORDER: ICD-10-CM

## 2024-01-03 PROCEDURE — G0279 TOMOSYNTHESIS, MAMMO: HCPCS

## 2024-01-04 ENCOUNTER — HOSPITAL ENCOUNTER (OUTPATIENT)
Dept: PHYSICAL THERAPY | Age: 64
Setting detail: THERAPIES SERIES
Discharge: HOME | End: 2024-01-04
Attending: SURGERY
Payer: COMMERCIAL

## 2024-01-04 PROCEDURE — 97110 THERAPEUTIC EXERCISES: CPT

## 2024-01-04 NOTE — PROGRESS NOTES
[facility]  Outpatient Physical Therapy    Treatment Note        Date: 2024  Patient: Dionne Cruz  : 1960   Confirmed: Yes  MRN: [id number]  Referring Provider: Chely Barillas MD    Medical Diagnosis: Lymphedema of breast [I89.0]  Ductal carcinoma in situ (DCIS) of right breast [D05.11]  Personal history of malignant neoplasm of breast [Z85.3]       Treatment Diagnosis: Rt breast edema    Visit Information:  Insurance: Payor: [facility] / Plan: [employer] FEDERAL / Product Type: *No Product type* /   PT Visit Information  PT Insurance Information: BCBS  Total # of Visits Approved: 68 (75v per year - 7 used, $25 copay)  Total # of Visits to Date: 3  No Show: 0  Canceled Appointment: 0  Progress Note Counter: 3/4-    Subjective Information:  Subjective: Patient reports having a Mammogram yesterday, has not been reviewed by Dr but Radiologist said results were good. F/U with Dr Barillas need week.  HEP Compliance:  [x] Good [] Fair [] Poor [] Reports not doing due to:               Pain Screening  Patient Currently in Pain: Denies    Treatment:  Exercises:  Exercises  Exercise 1: Lymphedema massage to Rt breast  Exercise 2: bikini stretch 20-30 s x  Exercise 3: Corner W stretch 20s x 3  Exercise 4: Rt sidelying stretch (shoulder abd)  Exercise 5: UBE level 2 for 4min (2min forward/2min retro)  Exercise 8: hep: Continue current massage+ shoulder abd str ( verbal)       *Indicates exercise, modality, or manual techniques to be initiated when appropriate    Objective Measures:           Circumferential Measurements:       Measurements [] Upper Extremity    [] Lower Extremity      Location Right (cm) 23 Left (cm) 23 Right (cm)   Sternum to spine        Axilla 53.5 55.5 54   10 cm below  57 57 57   15 cm below 63 63.5 62           Assessment:   Body Structures, Functions, Activity Limitations Requiring Skilled Therapeutic Intervention:  (Rt breast edema)  Assessment: Patient with decreased Rt

## 2024-01-09 ENCOUNTER — APPOINTMENT (OUTPATIENT)
Dept: PHYSICAL THERAPY | Age: 64
End: 2024-01-09
Attending: SURGERY
Payer: COMMERCIAL

## 2024-01-17 ENCOUNTER — OFFICE VISIT (OUTPATIENT)
Dept: SURGERY | Age: 64
End: 2024-01-17
Payer: COMMERCIAL

## 2024-01-17 VITALS
OXYGEN SATURATION: 97 % | HEART RATE: 81 BPM | BODY MASS INDEX: 38.89 KG/M2 | RESPIRATION RATE: 12 BRPM | TEMPERATURE: 97.4 F | DIASTOLIC BLOOD PRESSURE: 68 MMHG | HEIGHT: 66 IN | SYSTOLIC BLOOD PRESSURE: 112 MMHG | WEIGHT: 242 LBS

## 2024-01-17 DIAGNOSIS — R73.03 PREDIABETES: ICD-10-CM

## 2024-01-17 DIAGNOSIS — E03.9 HYPOTHYROIDISM, UNSPECIFIED: ICD-10-CM

## 2024-01-17 DIAGNOSIS — E66.9 OBESITY, CLASS II, BMI 35-39.9: ICD-10-CM

## 2024-01-17 DIAGNOSIS — I89.0 LYMPHEDEMA OF BREAST: ICD-10-CM

## 2024-01-17 DIAGNOSIS — Z12.31 ENCOUNTER FOR SCREENING MAMMOGRAM FOR BREAST CANCER: ICD-10-CM

## 2024-01-17 DIAGNOSIS — D05.11 DUCTAL CARCINOMA IN SITU (DCIS) OF RIGHT BREAST: Primary | ICD-10-CM

## 2024-01-17 PROCEDURE — 3078F DIAST BP <80 MM HG: CPT | Performed by: SURGERY

## 2024-01-17 PROCEDURE — 3074F SYST BP LT 130 MM HG: CPT | Performed by: SURGERY

## 2024-01-17 PROCEDURE — 99214 OFFICE O/P EST MOD 30 MIN: CPT | Performed by: SURGERY

## 2024-01-17 RX ORDER — LEVOTHYROXINE SODIUM 25 UG/1
TABLET ORAL
Qty: 90 TABLET | Refills: 1 | Status: SHIPPED | OUTPATIENT
Start: 2024-01-17

## 2024-01-17 RX ORDER — METFORMIN HYDROCHLORIDE 500 MG/1
500 TABLET ORAL NIGHTLY
Qty: 90 TABLET | Refills: 3 | Status: SHIPPED | OUTPATIENT
Start: 2024-01-17

## 2024-01-17 ASSESSMENT — ENCOUNTER SYMPTOMS
NAUSEA: 0
CHEST TIGHTNESS: 0
SORE THROAT: 0
SHORTNESS OF BREATH: 0
VOMITING: 0
COLOR CHANGE: 0
COUGH: 0
ABDOMINAL PAIN: 0

## 2024-01-17 NOTE — PROGRESS NOTES
General Information   Patient Name: Dionne Cruz  Patient : 1960    Patient phone:[phone]  Email: [email or web address]    Health Care Providers (Including Names, Institution)   Primary Care Provider: Johanna Reese MD    Surgeon: Chely Barillas MD [facility]   Radiation Oncologist: Dr. Js Peters    Medical Oncologist: [facility]       Treatment Summary   Diagnosis   Cancer Staging   Ductal carcinoma in situ (DCIS) of right breast  Staging form: Breast, AJCC 8th Edition  - Clinical: Stage 0 (cTis (DCIS), cN0, cM0, G2, ER+, MI+) - Signed by Chely Barillas MD on 2023       Diagnosis Year:    Treatment Completed   Surgery: [x] Yes   []No Surgery Date(s) (year): 2023   Surgical procedure/findings: Right Breast needle LOC Lumpectomy    Lymph node removal: []Axillary Dissection [] [place] Biopsy   Radiation: Yes Body area treated: Breast End Date (year):2023   Systemic Therapy (chemotherapy, hormonal therapy, other): Yes  [] Before surgery [x] After surgery   Treatment Ongoing   Additional treatment name Planned duration Possible Side effects   [] Tamoxifen 5-10 Years Hot flashes and vaginal discharge (common); endometrial cancer, serious blood clots and eye problems (all very rare). Other rare side effects may occur.   [x] Aromatase Inhibitors (anastrozole, exemestane and letrozole) 5 Years Hot flashes, joint/muscle aches, vaginal dryness and bone loss (common); hair thinning (rare) Other rare side effects may occur.   [] GnRH agonist (Zoladex, Lupron) for ovarian suppression  Hot flashes and vaginal dryness (common); other rare side effects may occur.   Other:     Persistent symptoms or side effects at completion of treatment:  Fatigue: Yes   some days                                                             Menopausal symptoms: Yes    hot flashes at night sometimes  Numbness:  No                                                          Pain: Yes    intermittent sharp 
Normal Range of motion in upper and lower extremities.   Lymphadenopathy:      Cervical: No cervical adenopathy.      Right cervical: No superficial, deep or posterior cervical adenopathy.     Left cervical: No superficial, deep or posterior cervical adenopathy.      Upper Body:      Right upper body: No supraclavicular, axillary or pectoral adenopathy.      Left upper body: No supraclavicular, axillary or pectoral adenopathy.   Skin:     General: Skin is warm and dry.      Findings: No abrasion, bruising, erythema or lesion.   Neurological:      Mental Status: She is alert and oriented to person, place, and time. She is not disoriented.   Psychiatric:         Speech: Speech normal.         Behavior: Behavior normal. Behavior is cooperative.         Thought Content: Thought content normal.         Judgment: Judgment normal.          IMAGING:     gokit TEE DIGITAL DIAGNOSTIC UNILATERAL RIGHT    Result Date: 1/3/2024  EXAMINATION: DIAGNOSTIC DIGITAL RIGHT BREAST MAMMOGRAM WITH TOMOSYNTHESIS, 1/3/2024 9:44 am TECHNIQUE: Diagnostic mammography of the right breast was performed with tomosynthesis. 2D standard and 3D tomosynthesis combination imaging performed through the right breast.  Computer aided detection was utilized in the interpretation of this exam. COMPARISON: 06/22/2023 and older priors back to 2021 HISTORY: ORDERING SYSTEM PROVIDED HISTORY: Breast disorder TECHNOLOGIST PROVIDED HISTORY: US if needed Right lumpectomy. FINDINGS: Density: There are scattered areas of fibroglandular density. Postsurgical changes are identified in the upper outer right breast.  No suspicious findings were noted.     No evidence of malignancy.  The patient may return for screening mammography in 6 months.  She was given results at the time of the exam. BIRADS: BIRADS - CATEGORY 2 Benign Findings.  Normal interval follow-up is recommended in 6 months. OVERALL ASSESSMENT - BENIGN A letter of notification will be sent to the patient

## 2024-01-18 ENCOUNTER — APPOINTMENT (OUTPATIENT)
Dept: PHYSICAL THERAPY | Age: 64
End: 2024-01-18
Attending: SURGERY
Payer: COMMERCIAL

## 2024-01-23 ENCOUNTER — HOSPITAL ENCOUNTER (OUTPATIENT)
Dept: RADIATION ONCOLOGY | Age: 64
Discharge: HOME | End: 2024-01-23
Payer: COMMERCIAL

## 2024-01-23 VITALS
SYSTOLIC BLOOD PRESSURE: 125 MMHG | WEIGHT: 234.4 LBS | BODY MASS INDEX: 37.27 KG/M2 | DIASTOLIC BLOOD PRESSURE: 76 MMHG | RESPIRATION RATE: 16 BRPM | OXYGEN SATURATION: 98 %

## 2024-01-23 DIAGNOSIS — D05.11 DUCTAL CARCINOMA IN SITU (DCIS) OF RIGHT BREAST: Primary | ICD-10-CM

## 2024-01-23 PROCEDURE — 99214 OFFICE O/P EST MOD 30 MIN: CPT | Performed by: RADIOLOGY

## 2024-01-23 PROCEDURE — 99212 OFFICE O/P EST SF 10 MIN: CPT | Performed by: RADIOLOGY

## 2024-02-21 ENCOUNTER — OFFICE VISIT (OUTPATIENT)
Dept: INFECTIOUS DISEASES | Age: 64
End: 2024-02-21

## 2024-02-21 VITALS
TEMPERATURE: 97.7 F | OXYGEN SATURATION: 96 % | BODY MASS INDEX: 38.09 KG/M2 | DIASTOLIC BLOOD PRESSURE: 85 MMHG | SYSTOLIC BLOOD PRESSURE: 114 MMHG | WEIGHT: 237 LBS | HEART RATE: 72 BPM | HEIGHT: 66 IN | RESPIRATION RATE: 18 BRPM

## 2024-02-21 DIAGNOSIS — A52.9 TERTIARY SYPHILIS: ICD-10-CM

## 2024-02-21 DIAGNOSIS — A52.9 TERTIARY SYPHILIS: Primary | ICD-10-CM

## 2024-02-21 ASSESSMENT — PATIENT HEALTH QUESTIONNAIRE - PHQ9
2. FEELING DOWN, DEPRESSED OR HOPELESS: 0
SUM OF ALL RESPONSES TO PHQ QUESTIONS 1-9: 0
1. LITTLE INTEREST OR PLEASURE IN DOING THINGS: 0
SUM OF ALL RESPONSES TO PHQ9 QUESTIONS 1 & 2: 0
SUM OF ALL RESPONSES TO PHQ QUESTIONS 1-9: 0

## 2024-02-21 NOTE — PROGRESS NOTES
Dionne Cruz (:  1960) is a 63 y.o. 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  She is concerned since she was diagnosed with breast cancer last year.  I explained the benefit of vaccination         Subjective   SUBJECTIVE/OBJECTIVE:  HPI  Referred by Dr. Reese for persistent + RPR despite the treatment.   Patient was treated initially at a young age with 1 IM penicillin injection.   Patient was sleep treated last year with 3 weekly benzathine penicillin injections.   Last RPR titer done in 2023 came back positive for 1:4  Patient had positive VDRL early last year  Patient has some inflammation of her hip contributing to little unsteady gait.   She has no headaches or problems with her vision.  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  Past Medical History:   Diagnosis Date    Arthritis     BRCA1 negative     BRCA2 negative     Breast cancer (HCC) 2023    right breast    Cancer (HCC)     BASAL CELL    Diabetes mellitus (HCC)     Hayfever     History of therapeutic radiation 2023    right breast    Hyperlipidemia     Hypertension     Obesity     Osteoarthritis     Rosacea     Thyroid disease     Vitamin D deficiency      Past Surgical History:   Procedure Laterality Date    BREAST BIOPSY N/A 2023    RIGHT BREAST NEEDLE LOCALIZED LUMPECTOMY performed by Chely Barillas MD at [facility]    COLONOSCOPY      negative-[facility]    ENDOSCOPY, COLON, DIAGNOSTIC      BRIANNA STEROTACTIC LOC BREAST BIOPSY RIGHT Right 2023    BRIANNA STEROTACTIC LOC BREAST BIOPSY RIGHT [facility]    MOHS SURGERY  2008    x3    TOTAL KNEE ARTHROPLASTY Left 2022

## 2024-02-22 DIAGNOSIS — A52.9 TERTIARY SYPHILIS: Primary | ICD-10-CM

## 2024-02-22 LAB
RPR SER QL: REACTIVE
RPR TITER: NORMAL

## 2024-02-23 ENCOUNTER — APPOINTMENT (OUTPATIENT)
Dept: INFECTIOUS DISEASES | Facility: CLINIC | Age: 64
End: 2024-02-23
Payer: COMMERCIAL

## 2024-02-24 LAB — T PALLIDUM AB SER QL AGGL: REACTIVE

## 2024-03-20 ENCOUNTER — LAB (OUTPATIENT)
Dept: LAB | Facility: LAB | Age: 64
End: 2024-03-20
Payer: COMMERCIAL

## 2024-03-20 DIAGNOSIS — I10 ESSENTIAL HYPERTENSION: ICD-10-CM

## 2024-03-20 DIAGNOSIS — E11.9 TYPE 2 DIABETES MELLITUS WITHOUT COMPLICATION, WITHOUT LONG-TERM CURRENT USE OF INSULIN (MULTI): ICD-10-CM

## 2024-03-20 DIAGNOSIS — E03.9 BORDERLINE HYPOTHYROIDISM: ICD-10-CM

## 2024-03-20 DIAGNOSIS — E78.2 MIXED HYPERLIPIDEMIA: ICD-10-CM

## 2024-03-20 DIAGNOSIS — Z86.19 HISTORY OF SYPHILIS: ICD-10-CM

## 2024-03-20 LAB
ALBUMIN SERPL BCP-MCNC: 4.3 G/DL (ref 3.4–5)
ALP SERPL-CCNC: 110 U/L (ref 33–136)
ALT SERPL W P-5'-P-CCNC: 24 U/L (ref 7–45)
ANION GAP SERPL CALC-SCNC: 11 MMOL/L (ref 10–20)
AST SERPL W P-5'-P-CCNC: 19 U/L (ref 9–39)
BILIRUB SERPL-MCNC: 0.5 MG/DL (ref 0–1.2)
BUN SERPL-MCNC: 21 MG/DL (ref 6–23)
CALCIUM SERPL-MCNC: 9.7 MG/DL (ref 8.6–10.3)
CHLORIDE SERPL-SCNC: 107 MMOL/L (ref 98–107)
CHOLEST SERPL-MCNC: 158 MG/DL (ref 0–199)
CHOLESTEROL/HDL RATIO: 3
CO2 SERPL-SCNC: 29 MMOL/L (ref 21–32)
CREAT SERPL-MCNC: 0.69 MG/DL (ref 0.5–1.05)
EGFRCR SERPLBLD CKD-EPI 2021: >90 ML/MIN/1.73M*2
ERYTHROCYTE [DISTWIDTH] IN BLOOD BY AUTOMATED COUNT: 13.5 % (ref 11.5–14.5)
EST. AVERAGE GLUCOSE BLD GHB EST-MCNC: 117 MG/DL
GLUCOSE SERPL-MCNC: 99 MG/DL (ref 74–99)
HBA1C MFR BLD: 5.7 %
HCT VFR BLD AUTO: 45.8 % (ref 36–46)
HDLC SERPL-MCNC: 53.3 MG/DL
HGB BLD-MCNC: 14.5 G/DL (ref 12–16)
LDLC SERPL CALC-MCNC: 59 MG/DL
MCH RBC QN AUTO: 28.5 PG (ref 26–34)
MCHC RBC AUTO-ENTMCNC: 31.7 G/DL (ref 32–36)
MCV RBC AUTO: 90 FL (ref 80–100)
NON HDL CHOLESTEROL: 105 MG/DL (ref 0–149)
NRBC BLD-RTO: 0 /100 WBCS (ref 0–0)
PLATELET # BLD AUTO: 295 X10*3/UL (ref 150–450)
POTASSIUM SERPL-SCNC: 4.8 MMOL/L (ref 3.5–5.3)
PROT SERPL-MCNC: 6.9 G/DL (ref 6.4–8.2)
RBC # BLD AUTO: 5.08 X10*6/UL (ref 4–5.2)
SODIUM SERPL-SCNC: 142 MMOL/L (ref 136–145)
T4 FREE SERPL-MCNC: 0.84 NG/DL (ref 0.61–1.12)
TRIGL SERPL-MCNC: 228 MG/DL (ref 0–149)
TSH SERPL-ACNC: 1.9 MIU/L (ref 0.44–3.98)
VLDL: 46 MG/DL (ref 0–40)
WBC # BLD AUTO: 5.5 X10*3/UL (ref 4.4–11.3)

## 2024-03-20 PROCEDURE — 80061 LIPID PANEL: CPT

## 2024-03-20 PROCEDURE — 85027 COMPLETE CBC AUTOMATED: CPT

## 2024-03-20 PROCEDURE — 84443 ASSAY THYROID STIM HORMONE: CPT

## 2024-03-20 PROCEDURE — 83036 HEMOGLOBIN GLYCOSYLATED A1C: CPT

## 2024-03-20 PROCEDURE — 84439 ASSAY OF FREE THYROXINE: CPT

## 2024-03-20 PROCEDURE — 36415 COLL VENOUS BLD VENIPUNCTURE: CPT

## 2024-03-20 PROCEDURE — 80053 COMPREHEN METABOLIC PANEL: CPT

## 2024-03-22 ENCOUNTER — APPOINTMENT (OUTPATIENT)
Dept: INFECTIOUS DISEASES | Facility: CLINIC | Age: 64
End: 2024-03-22
Payer: COMMERCIAL

## 2024-04-04 PROBLEM — R25.1 TREMOR: Status: RESOLVED | Noted: 2023-05-01 | Resolved: 2024-04-04

## 2024-04-08 DIAGNOSIS — M25.551 RIGHT HIP PAIN: ICD-10-CM

## 2024-04-08 DIAGNOSIS — S46.011A STRAIN OF RIGHT ROTATOR CUFF CAPSULE, INITIAL ENCOUNTER: ICD-10-CM

## 2024-04-08 RX ORDER — MELOXICAM 15 MG/1
15 TABLET ORAL DAILY PRN
Qty: 30 TABLET | Refills: 3 | Status: SHIPPED | OUTPATIENT
Start: 2024-04-08

## 2024-04-18 NOTE — PROGRESS NOTES
NURSING ASSESSMENT     Date: 1/23/2024        Patient Name: Dionne Cruz     YOB: 1960      Age:  63 y.o.      MRN: [id number]       Chaperone [] Yes   [x] No      Advance Directives:   Do you currently have completed advance directives (living will)? [x] Yes   [] No         *If yes, please bring us a copy for your records.  *If no, would you like info or assistance in completing advance directives (living will)?   [] Yes   [x] No    Pain Score  Pain Score (1-10): 0   Pain Location: Right hip flexor, right groin   Pain Duration: intermittent   Pain Management/Control: physical therapy       Is pain affecting your ability to take care of yourself or move throughout your home?                        [] Yes   [x] No    General: No Problems  Patient has gained weight [] Yes   [x] No  Patient has lost weight [] Yes   [x] No  How much weight in pounds and over what length of time:     Eyes (Ophthalmic): No Problem     Skin (Dermatological): new skin tag to left upper arm. Seeing a dermatologist next week     ENT: No Problems     Respiratory: No Problems     Cardiovascular: No Problems      Device   [] Yes   [x] No   Copy of Card Obtained [] Yes   [x] No    Gastrointestinal: Takes daily fiber supplement    Genito-Urinary: No Problems     Breast: No Problems     Musculoskeletal: Joint Pain    Neurological: No Problems      Hematological and Lymphatic: No Problems     Endocrine: Diabetes Mellitus and Thyroid Problems      A 10-point review of systems  has been conducted and pertinent positives have been   recorded. All other review of systems are negative    Was the patient admitted during the course of treatment OR within 30 days of treatment? N/A    If yes: n/a  Date of Admission: n/a  Hospital: n/a    Additional Comments:        
     Palpations: Abdomen is soft.   Musculoskeletal:         General: Normal range of motion.      Cervical back: Normal range of motion and neck supple. No rigidity or tenderness.      Right lower leg: No edema.      Left lower leg: No edema.   Lymphadenopathy:      Cervical: No cervical adenopathy.   Skin:     General: Skin is warm and dry.      Comments: There is a pigmented lesion of the left arm that on dermoscopy shows scattered areas of hypo and hyperpigmentation with telangiectasias.  See media.  Neurological:      General: No focal deficit present.      Mental Status: She is alert and oriented to person, place, and time. Mental status is at baseline.      Cranial Nerves: No cranial nerve deficit.   Psychiatric:         Behavior: Behavior normal.     ASSESSMENT/PLAN:  This is a 63-year-old female with history of carcinoma in situ of the right breast status post lumpectomy with pathology revealing negative margins, grade 2, ER+/FL+.  She went on to have adjuvant radiation therapy to the intact right breast 4256 cGy in 16 fractions followed by a sequential boost to the tumor bed to 5256 cGy in 4 fractions.  She returns for routine follow-up approximately 3 months after completing radiation therapy.    I discussed with her that clinically and radiographically she appears to be free of disease recurrence.  I did recommend that she continue to moisturize skin of the right breast to help promote longer-term healing.  Regarding her left arm lesion she will have a biopsy by her dermatologist and I discussed the potential for treatment by nonoperative means.  She will let us know.  At the latest I will see her back for routine follow-up in 7 months and potentially sooner.  In the interim she knows that we remain available should she have any additional questions or concerns.    A combined total of 40 minutes was spent in preparing this encounter as well as in meeting with the patient in person.  Please excuse any

## 2024-06-14 ENCOUNTER — TRANSCRIBE ORDERS (OUTPATIENT)
Dept: ADMINISTRATIVE | Age: 64
End: 2024-06-14

## 2024-06-14 DIAGNOSIS — Z78.0 POSTMENOPAUSE: Primary | ICD-10-CM

## 2024-06-16 DIAGNOSIS — E03.9 HYPOTHYROIDISM, UNSPECIFIED: ICD-10-CM

## 2024-06-17 DIAGNOSIS — R73.03 PREDIABETES: ICD-10-CM

## 2024-06-17 RX ORDER — LEVOTHYROXINE SODIUM 25 UG/1
TABLET ORAL
Qty: 90 TABLET | Refills: 1 | Status: SHIPPED | OUTPATIENT
Start: 2024-06-17

## 2024-06-26 ENCOUNTER — HOSPITAL ENCOUNTER (OUTPATIENT)
Dept: WOMENS IMAGING | Age: 64
Discharge: HOME | End: 2024-06-28
Attending: SURGERY
Payer: COMMERCIAL

## 2024-06-26 ENCOUNTER — HOSPITAL ENCOUNTER (OUTPATIENT)
Dept: WOMENS IMAGING | Age: 64
Discharge: HOME | End: 2024-06-28
Attending: INTERNAL MEDICINE
Payer: COMMERCIAL

## 2024-06-26 DIAGNOSIS — Z12.31 ENCOUNTER FOR SCREENING MAMMOGRAM FOR BREAST CANCER: ICD-10-CM

## 2024-06-26 DIAGNOSIS — Z78.0 POSTMENOPAUSE: ICD-10-CM

## 2024-06-26 PROCEDURE — 77063 BREAST TOMOSYNTHESIS BI: CPT

## 2024-06-26 PROCEDURE — 77080 DXA BONE DENSITY AXIAL: CPT

## 2024-06-26 NOTE — RESULT ENCOUNTER NOTE
Your recent imaging was benign. No evidence of cancer was found. Will see you in clinic soon. Opt out

## 2024-07-10 ENCOUNTER — APPOINTMENT (OUTPATIENT)
Dept: PRIMARY CARE | Facility: CLINIC | Age: 64
End: 2024-07-10
Payer: COMMERCIAL

## 2024-07-10 ENCOUNTER — LAB (OUTPATIENT)
Dept: LAB | Facility: LAB | Age: 64
End: 2024-07-10
Payer: COMMERCIAL

## 2024-07-10 VITALS
HEIGHT: 66 IN | SYSTOLIC BLOOD PRESSURE: 108 MMHG | BODY MASS INDEX: 38.09 KG/M2 | OXYGEN SATURATION: 97 % | WEIGHT: 237 LBS | DIASTOLIC BLOOD PRESSURE: 66 MMHG | RESPIRATION RATE: 16 BRPM | TEMPERATURE: 97.3 F | HEART RATE: 82 BPM

## 2024-07-10 DIAGNOSIS — E03.9 BORDERLINE HYPOTHYROIDISM: ICD-10-CM

## 2024-07-10 DIAGNOSIS — M17.0 PRIMARY OSTEOARTHRITIS OF BOTH KNEES: ICD-10-CM

## 2024-07-10 DIAGNOSIS — I10 ESSENTIAL HYPERTENSION: ICD-10-CM

## 2024-07-10 DIAGNOSIS — G25.81 RLS (RESTLESS LEGS SYNDROME): Primary | ICD-10-CM

## 2024-07-10 DIAGNOSIS — G25.0 ESSENTIAL TREMOR: ICD-10-CM

## 2024-07-10 DIAGNOSIS — L71.9 ROSACEA: ICD-10-CM

## 2024-07-10 DIAGNOSIS — F51.01 PRIMARY INSOMNIA: ICD-10-CM

## 2024-07-10 DIAGNOSIS — G25.81 RLS (RESTLESS LEGS SYNDROME): ICD-10-CM

## 2024-07-10 DIAGNOSIS — E78.2 MIXED HYPERLIPIDEMIA: ICD-10-CM

## 2024-07-10 DIAGNOSIS — C50.911 MALIGNANT NEOPLASM OF RIGHT FEMALE BREAST, UNSPECIFIED ESTROGEN RECEPTOR STATUS, UNSPECIFIED SITE OF BREAST (MULTI): ICD-10-CM

## 2024-07-10 DIAGNOSIS — E11.9 TYPE 2 DIABETES MELLITUS WITHOUT COMPLICATION, WITHOUT LONG-TERM CURRENT USE OF INSULIN (MULTI): ICD-10-CM

## 2024-07-10 DIAGNOSIS — D05.11 DUCTAL CARCINOMA IN SITU (DCIS) OF RIGHT BREAST: ICD-10-CM

## 2024-07-10 LAB
ALBUMIN SERPL BCP-MCNC: 4.5 G/DL (ref 3.4–5)
ALP SERPL-CCNC: 121 U/L (ref 33–136)
ALT SERPL W P-5'-P-CCNC: 23 U/L (ref 7–45)
ANION GAP SERPL CALC-SCNC: 13 MMOL/L (ref 10–20)
AST SERPL W P-5'-P-CCNC: 20 U/L (ref 9–39)
BASOPHILS # BLD AUTO: 0.04 X10*3/UL (ref 0–0.1)
BASOPHILS NFR BLD AUTO: 0.8 %
BILIRUB SERPL-MCNC: 0.6 MG/DL (ref 0–1.2)
BUN SERPL-MCNC: 17 MG/DL (ref 6–23)
CALCIUM SERPL-MCNC: 10 MG/DL (ref 8.6–10.3)
CHLORIDE SERPL-SCNC: 104 MMOL/L (ref 98–107)
CO2 SERPL-SCNC: 28 MMOL/L (ref 21–32)
CREAT SERPL-MCNC: 0.7 MG/DL (ref 0.5–1.05)
EGFRCR SERPLBLD CKD-EPI 2021: >90 ML/MIN/1.73M*2
EOSINOPHIL # BLD AUTO: 0.08 X10*3/UL (ref 0–0.7)
EOSINOPHIL NFR BLD AUTO: 1.5 %
ERYTHROCYTE [DISTWIDTH] IN BLOOD BY AUTOMATED COUNT: 13.2 % (ref 11.5–14.5)
EST. AVERAGE GLUCOSE BLD GHB EST-MCNC: 123 MG/DL
FERRITIN SERPL-MCNC: 84 NG/ML (ref 8–150)
GLUCOSE SERPL-MCNC: 104 MG/DL (ref 74–99)
HBA1C MFR BLD: 5.9 %
HCT VFR BLD AUTO: 45 % (ref 36–46)
HGB BLD-MCNC: 14.3 G/DL (ref 12–16)
IMM GRANULOCYTES # BLD AUTO: 0.02 X10*3/UL (ref 0–0.7)
IMM GRANULOCYTES NFR BLD AUTO: 0.4 % (ref 0–0.9)
LYMPHOCYTES # BLD AUTO: 1.84 X10*3/UL (ref 1.2–4.8)
LYMPHOCYTES NFR BLD AUTO: 35 %
MAGNESIUM SERPL-MCNC: 1.94 MG/DL (ref 1.6–2.4)
MCH RBC QN AUTO: 27.8 PG (ref 26–34)
MCHC RBC AUTO-ENTMCNC: 31.8 G/DL (ref 32–36)
MCV RBC AUTO: 88 FL (ref 80–100)
MONOCYTES # BLD AUTO: 0.43 X10*3/UL (ref 0.1–1)
MONOCYTES NFR BLD AUTO: 8.2 %
NEUTROPHILS # BLD AUTO: 2.85 X10*3/UL (ref 1.2–7.7)
NEUTROPHILS NFR BLD AUTO: 54.1 %
NRBC BLD-RTO: 0 /100 WBCS (ref 0–0)
PLATELET # BLD AUTO: 282 X10*3/UL (ref 150–450)
POTASSIUM SERPL-SCNC: 4.8 MMOL/L (ref 3.5–5.3)
PROT SERPL-MCNC: 6.9 G/DL (ref 6.4–8.2)
RBC # BLD AUTO: 5.14 X10*6/UL (ref 4–5.2)
SODIUM SERPL-SCNC: 140 MMOL/L (ref 136–145)
VIT B12 SERPL-MCNC: 305 PG/ML (ref 211–911)
WBC # BLD AUTO: 5.3 X10*3/UL (ref 4.4–11.3)

## 2024-07-10 PROCEDURE — 85025 COMPLETE CBC W/AUTO DIFF WBC: CPT

## 2024-07-10 PROCEDURE — 82652 VIT D 1 25-DIHYDROXY: CPT

## 2024-07-10 PROCEDURE — 3048F LDL-C <100 MG/DL: CPT | Performed by: FAMILY MEDICINE

## 2024-07-10 PROCEDURE — 82607 VITAMIN B-12: CPT

## 2024-07-10 PROCEDURE — 3078F DIAST BP <80 MM HG: CPT | Performed by: FAMILY MEDICINE

## 2024-07-10 PROCEDURE — 80053 COMPREHEN METABOLIC PANEL: CPT

## 2024-07-10 PROCEDURE — 3074F SYST BP LT 130 MM HG: CPT | Performed by: FAMILY MEDICINE

## 2024-07-10 PROCEDURE — 1036F TOBACCO NON-USER: CPT | Performed by: FAMILY MEDICINE

## 2024-07-10 PROCEDURE — 3044F HG A1C LEVEL LT 7.0%: CPT | Performed by: FAMILY MEDICINE

## 2024-07-10 PROCEDURE — 83735 ASSAY OF MAGNESIUM: CPT

## 2024-07-10 PROCEDURE — 99214 OFFICE O/P EST MOD 30 MIN: CPT | Performed by: FAMILY MEDICINE

## 2024-07-10 PROCEDURE — 36415 COLL VENOUS BLD VENIPUNCTURE: CPT

## 2024-07-10 PROCEDURE — 82728 ASSAY OF FERRITIN: CPT

## 2024-07-10 PROCEDURE — 83036 HEMOGLOBIN GLYCOSYLATED A1C: CPT

## 2024-07-10 NOTE — PROGRESS NOTES
Covid vax: UTD  Flu: UTD  RSV: UTD  Shingles: UTD    CRC: 2020  Mammogram: 6/2024  Pap: 8/2023  Lmp: n/a

## 2024-07-10 NOTE — PROGRESS NOTES
Subjective   Patient ID: Emelina Powell is a 64 y.o. female who presents for Diabetes, Hypertension, and Hyperlipidemia.  Covid vax: UTD  Flu: UTD  RSV: UTD  Shingles: UTD     CRC: 2020  Mammogram: 6/2024  Pap: 8/2023  Lmp: n/a  Hba1c 5.7  Ldl 59  Had genetic testing done-no CA genes for female cancers      HPI  Patient Active Problem List   Diagnosis    Borderline hypothyroidism    Essential hypertension    Essential tremor    HSV-1 (herpes simplex virus 1) infection    HSV-2 (herpes simplex virus 2) infection    Mixed hyperlipidemia    Primary osteoarthritis of both knees    Rosacea    Type 2 diabetes mellitus without complication, without long-term current use of insulin (Multi)    Personal history of malignant neoplasm of breast    History of syphilis    Ductal carcinoma in situ (DCIS) of right breast    Malignant neoplasm of right female breast, unspecified estrogen receptor status, unspecified site of breast (Multi)       Past Surgical History:   Procedure Laterality Date    BI STEREOTACTIC GUIDED BREAST RIGHT LOCALIZATION AND BIOPSY Right 4/13/2023    BI STEREOTACTIC GUIDED BREAST RIGHT LOCALIZATION AND BIOPSY 4/13/2023    BREAST BIOPSY  06/22/2023    COLONOSCOPY  10/2020    no polyps    ESOPHAGOGASTRODUODENOSCOPY  10/2020    OTHER SURGICAL HISTORY  2018    Mohs surgery x 3    TOTAL KNEE ARTHROPLASTY Right 06/2019    WISDOM TOOTH EXTRACTION  1979       Review of Systems  This patient has  NO history of seizures/ CAD or CVA    NO history of recent Covid nor flu symptoms,  NO Fever nor chills,  NO Chest pain, shortness of breath nor paroxysmal nocturnal dyspnea,  NO Nausea, vomiting, nor diarrhea,  NO Hematochezia nor melena,  NO Dysuria, hematuria, nor new incontinence issues  NO new severe headaches nor neurological complaints,  NO new issues with anxiety nor depression nor new psychiatric complaints,  NO suicidal nor homicidal ideations.     OBJECTIVE:  /66   Pulse 82   Temp 36.3 °C (97.3 °F)  "(Temporal)   Resp 16   Ht 1.676 m (5' 6\")   Wt 108 kg (237 lb)   LMP  (LMP Unknown)   SpO2 97%   BMI 38.25 kg/m²      General:  alert, oriented, no acute distress.  No obvious skin rashes noted.   No gait disturbance noted.    Mood is pleasant,  no signs of emotional distress.   Not appearing intoxicated or altered.   No voiced delusions,   Normal, appropriate behavior.    HEENT: Normocephalic, atraumatic,   Pupils round, reactive to light  Extraocular motions intact and wnl  Tympanic membranes normal    Neck: no nuchal rigidity  No masses palpable.  No carotid bruits.  No thyromegaly.    Respiratory: Equal breath sounds  No wheezes,    rales,    nor rhonchi  No respiratory distress.    Heart: Regular rate and rhythm, no    murmurs  no rubs/gallops    Abdomen: no masses palpable, nontender, no rebound nor guarding.  overwt  Extremities: NO cyanosis noted, no clubbing.   No edema noted.  2+dorsalis pedis pulses.    Normal-not antalgic, steady gait.    No visits with results within 3 Month(s) from this visit.   Latest known visit with results is:   Lab on 03/20/2024   Component Date Value Ref Range Status    Glucose 03/20/2024 99  74 - 99 mg/dL Final    Sodium 03/20/2024 142  136 - 145 mmol/L Final    Potassium 03/20/2024 4.8  3.5 - 5.3 mmol/L Final    Chloride 03/20/2024 107  98 - 107 mmol/L Final    Bicarbonate 03/20/2024 29  21 - 32 mmol/L Final    Anion Gap 03/20/2024 11  10 - 20 mmol/L Final    Urea Nitrogen 03/20/2024 21  6 - 23 mg/dL Final    Creatinine 03/20/2024 0.69  0.50 - 1.05 mg/dL Final    eGFR 03/20/2024 >90  >60 mL/min/1.73m*2 Final    Calculations of estimated GFR are performed using the 2021 CKD-EPI Study Refit equation without the race variable for the IDMS-Traceable creatinine methods.  https://jasn.asnjournals.org/content/early/2021/09/22/ASN.4252920267    Calcium 03/20/2024 9.7  8.6 - 10.3 mg/dL Final    Albumin 03/20/2024 4.3  3.4 - 5.0 g/dL Final    Alkaline Phosphatase 03/20/2024 110  33 " - 136 U/L Final    Total Protein 03/20/2024 6.9  6.4 - 8.2 g/dL Final    AST 03/20/2024 19  9 - 39 U/L Final    Bilirubin, Total 03/20/2024 0.5  0.0 - 1.2 mg/dL Final    ALT 03/20/2024 24  7 - 45 U/L Final    Patients treated with Sulfasalazine may generate falsely decreased results for ALT.    Cholesterol 03/20/2024 158  0 - 199 mg/dL Final          Age      Desirable   Borderline High   High     0-19 Y     0 - 169       170 - 199     >/= 200    20-24 Y     0 - 189       190 - 224     >/= 225         >24 Y     0 - 199       200 - 239     >/= 240   **All ranges are based on fasting samples. Specific   therapeutic targets will vary based on patient-specific   cardiac risk.    Pediatric guidelines reference:Pediatrics 2011, 128(S5).Adult guidelines reference: NCEP ATPIII Guidelines,[place] 2001, 258:2486-97    Venipuncture immediately after or during the administration of Metamizole may lead to falsely low results. Testing should be performed immediately prior to Metamizole dosing.    HDL-Cholesterol 03/20/2024 53.3  mg/dL Final      Age       Very Low   Low     Normal    High    0-19 Y    < 35      < 40     40-45     ----  20-24 Y    ----     < 40      >45      ----        >24 Y      ----     < 40     40-60      >60      Cholesterol/HDL Ratio 03/20/2024 3.0   Final      Ref Values  Desirable  < 3.4  High Risk  > 5.0    LDL Calculated 03/20/2024 59  <=99 mg/dL Final                                Near   Borderline      AGE      Desirable  Optimal    High     High     Very High     0-19 Y     0 - 109     ---    110-129   >/= 130     ----    20-24 Y     0 - 119     ---    120-159   >/= 160     ----      >24 Y     0 -  99   100-129  130-159   160-189     >/=190      VLDL 03/20/2024 46 (H)  0 - 40 mg/dL Final    Triglycerides 03/20/2024 228 (H)  0 - 149 mg/dL Final       Age         Desirable   Borderline High   High     Very High   0 D-90 D    19 - 174         ----         ----        ----  91 D- 9 Y     0 -  74        75 -   99     >/= 100      ----    10-19 Y     0 -  89        90 - 129     >/= 130      ----    20-24 Y     0 - 114       115 - 149     >/= 150      ----         >24 Y     0 - 149       150 - 199    200- 499    >/= 500    Venipuncture immediately after or during the administration of Metamizole may lead to falsely low results. Testing should be performed immediately prior to Metamizole dosing.    Non HDL Cholesterol 03/20/2024 105  0 - 149 mg/dL Final          Age       Desirable   Borderline High   High     Very High     0-19 Y     0 - 119       120 - 144     >/= 145    >/= 160    20-24 Y     0 - 149       150 - 189     >/= 190      ----         >24 Y    30 mg/dL above LDL Cholesterol goal      WBC 03/20/2024 5.5  4.4 - 11.3 x10*3/uL Final    nRBC 03/20/2024 0.0  0.0 - 0.0 /100 WBCs Final    RBC 03/20/2024 5.08  4.00 - 5.20 x10*6/uL Final    Hemoglobin 03/20/2024 14.5  12.0 - 16.0 g/dL Final    Hematocrit 03/20/2024 45.8  36.0 - 46.0 % Final    MCV 03/20/2024 90  80 - 100 fL Final    MCH 03/20/2024 28.5  26.0 - 34.0 pg Final    MCHC 03/20/2024 31.7 (L)  32.0 - 36.0 g/dL Final    RDW 03/20/2024 13.5  11.5 - 14.5 % Final    Platelets 03/20/2024 295  150 - 450 x10*3/uL Final    Thyroid Stimulating Hormone 03/20/2024 1.90  0.44 - 3.98 mIU/L Final    Thyroxine, Free 03/20/2024 0.84  0.61 - 1.12 ng/dL Final    Hemoglobin A1C 03/20/2024 5.7 (H)  see below % Final    Estimated Average Glucose 03/20/2024 117  Not Established mg/dL Final        Assessment/Plan     Problem List Items Addressed This Visit       Borderline hypothyroidism    Essential hypertension    Essential tremor    Mixed hyperlipidemia    Primary osteoarthritis of both knees    Rosacea    Type 2 diabetes mellitus without complication, without long-term current use of insulin (Multi)    Ductal carcinoma in situ (DCIS) of right breast    Malignant neoplasm of right female breast, unspecified estrogen receptor status, unspecified site of breast (Multi)      Technically prediabetes x 2+y  Sees heme-onc  Saw ID for rpr-syphilis  Reports fatigue  Would like labs  The patient is aware that results will be forthcoming of ALL planned labs and or tests. If no results are received on my chart or by letter within 1 - 3 weeks, the patient is aware they need to call to obtain results, as this is not usual. Also, if any new conditions arise, or current condition worsens, it is understood that sooner appointment should be made or urgent care/convenient care or emergency room treatment should be sought depending on severity. Otherwise follow up for recheck at regular intervals as we have discussed, at least yearly.  See me 6mo  Sleep study for insomnia        Follow up at next scheduled visit -as planned  And again had a lengthy discussion w pt  about risks of poorly controlled pre diabetes including micro and macrovascular complications of DM2 including blindness,MI,CVA and death among other possibilities. Pt aware and agrees to better -or if good control-continued compliance and adherance to instructions such as regular eye exams q 1-2 y, foot exams,and f/u regularly for hba1c with a goal of 6.5.    Follow up as planned for hba1c and BP checks REGULARLY.  Try melatonin This medications risks, benefits, and alternatives were discussed with patient at length.  If any unwanted side effects occur-discontinue medicine and call the office for discussion.  Dark sk L post knee and L back and mid chest epidermoid-all bx soon

## 2024-07-12 LAB — 1,25(OH)2D SERPL-MCNC: 37.6 PG/ML (ref 19.9–79.3)

## 2024-07-16 ENCOUNTER — OFFICE VISIT (OUTPATIENT)
Dept: RADIATION ONCOLOGY | Age: 64
End: 2024-07-16
Payer: COMMERCIAL

## 2024-07-16 VITALS
OXYGEN SATURATION: 97 % | SYSTOLIC BLOOD PRESSURE: 133 MMHG | RESPIRATION RATE: 16 BRPM | HEART RATE: 82 BPM | WEIGHT: 238 LBS | DIASTOLIC BLOOD PRESSURE: 69 MMHG | HEIGHT: 66 IN | BODY MASS INDEX: 38.25 KG/M2

## 2024-07-16 DIAGNOSIS — Z12.31 ENCOUNTER FOR SCREENING MAMMOGRAM FOR BREAST CANCER: ICD-10-CM

## 2024-07-16 DIAGNOSIS — Z08 ENCOUNTER FOR FOLLOW-UP SURVEILLANCE OF BREAST CANCER: Primary | ICD-10-CM

## 2024-07-16 DIAGNOSIS — Z85.3 ENCOUNTER FOR FOLLOW-UP SURVEILLANCE OF BREAST CANCER: Primary | ICD-10-CM

## 2024-07-16 PROCEDURE — 3075F SYST BP GE 130 - 139MM HG: CPT | Performed by: NURSE PRACTITIONER

## 2024-07-16 PROCEDURE — 3078F DIAST BP <80 MM HG: CPT | Performed by: NURSE PRACTITIONER

## 2024-07-16 PROCEDURE — 99213 OFFICE O/P EST LOW 20 MIN: CPT | Performed by: NURSE PRACTITIONER

## 2024-07-16 ASSESSMENT — ENCOUNTER SYMPTOMS
COUGH: 0
VOMITING: 0
NAUSEA: 0
TROUBLE SWALLOWING: 0
SHORTNESS OF BREATH: 0
EYE DISCHARGE: 0
ABDOMINAL PAIN: 0

## 2024-07-16 NOTE — PROGRESS NOTES
Outpatient Medications:     anastrozole (ARIMIDEX) 1 MG tablet, Take 1 tablet by mouth daily, Disp: , Rfl:     meloxicam (MOBIC) 15 MG tablet, Take 1 tablet by mouth daily as needed for Pain, Disp: , Rfl:     spironolactone (ALDACTONE) 25 MG tablet, Take 1 tablet by mouth daily, Disp: , Rfl:     levothyroxine (SYNTHROID) 25 MCG tablet, Take 1 tablet by mouth every morning (before breakfast), Disp: , Rfl:     empagliflozin (JARDIANCE) 10 MG tablet, Take 1 tablet by mouth daily, Disp: , Rfl:     biotin (VITAMIN B7) 5 MG TABS tablet, Take 1 tablet by mouth daily, Disp: , Rfl:     atorvastatin (LIPITOR) 20 MG tablet, Take 1 tablet by mouth daily, Disp: 90 tablet, Rfl: 3    metFORMIN (GLUCOPHAGE) 500 MG tablet, Take 1 tablet by mouth nightly, Disp: 90 tablet, Rfl: 3    doxycycline hyclate (VIBRAMYCIN) 100 MG capsule, Take 1 capsule by mouth daily, Disp: , Rfl: 3    Calcium Carbonate-Vit D-Min 9413-9265 MG-UNIT CHEW, Take 1 tablet by mouth daily., Disp: 90 tablet, Rfl: 3     REVIEW OF SYSTEMS:   Review of Systems   Constitutional:  Positive for fatigue. Negative for appetite change, chills and fever.   HENT:  Negative for trouble swallowing.    Eyes:  Negative for discharge and visual disturbance.   Respiratory:  Negative for cough and shortness of breath.    Cardiovascular:  Negative for chest pain and leg swelling.   Gastrointestinal:  Negative for abdominal pain, nausea and vomiting.   Genitourinary:  Negative for vaginal bleeding.   Musculoskeletal:  Positive for arthralgias. Negative for gait problem.   Neurological:  Negative for tremors, facial asymmetry, weakness and headaches.   Psychiatric/Behavioral:  Positive for sleep disturbance. Negative for agitation and confusion. The patient is not nervous/anxious.         PHYSICAL EXAM:  /69 (Site: Left Upper Arm, Position: Sitting, Cuff Size: Large Adult)   Pulse 82   Resp 16   Ht 1.676 m (5' 6\")   Wt 108 kg (238 lb)   LMP 08/04/2012 (Approximate)   SpO2

## 2024-07-26 ENCOUNTER — PROCEDURE VISIT (OUTPATIENT)
Dept: SLEEP MEDICINE | Facility: HOSPITAL | Age: 64
End: 2024-07-26
Payer: COMMERCIAL

## 2024-07-26 DIAGNOSIS — G25.81 RLS (RESTLESS LEGS SYNDROME): ICD-10-CM

## 2024-07-26 DIAGNOSIS — F51.01 PRIMARY INSOMNIA: ICD-10-CM

## 2024-07-26 NOTE — PROGRESS NOTES
Type of Study: HOME SLEEP STUDY - NOMAD     The patient received equipment and instructions for use of the [facility] Nomad HSAT device #[id number]. The patient was instructed how to apply the effort belts, cannula, thermistor. It was also explained how the Nomad and oximeter components work.  The patient was asked to record their sleep for an 8-hour period.     The patient was informed of their responsibility for the device and acknowledged this by signing the HSAT device contract. The patient was asked to return the device on 7/27/2024 between the hours of 9-11 am to the [facility].     The patient was instructed to call 911 as usual for any medical- emergencies while at home.  The patient was also given a phone number for troubleshooting when using the device in case there were additional questions.

## 2024-08-01 ENCOUNTER — TELEPHONE (OUTPATIENT)
Dept: INFECTIOUS DISEASES | Age: 64
End: 2024-08-01

## 2024-08-01 DIAGNOSIS — A52.9 TERTIARY SYPHILIS: ICD-10-CM

## 2024-08-01 NOTE — TELEPHONE ENCOUNTER
Called patient LMOVM to return my call in the office to Reschedule an Appointment ( 8/8/24)  Provider not available on that day.

## 2024-08-02 DIAGNOSIS — G47.34 NOCTURNAL HYPOXEMIA: ICD-10-CM

## 2024-08-02 DIAGNOSIS — G47.30 SEVERE SLEEP APNEA: ICD-10-CM

## 2024-08-02 LAB
RPR SER QL: REACTIVE
RPR TITER: NORMAL

## 2024-08-05 ENCOUNTER — APPOINTMENT (OUTPATIENT)
Dept: PRIMARY CARE | Facility: CLINIC | Age: 64
End: 2024-08-05
Payer: COMMERCIAL

## 2024-08-05 VITALS
TEMPERATURE: 97.1 F | BODY MASS INDEX: 39.05 KG/M2 | RESPIRATION RATE: 16 BRPM | SYSTOLIC BLOOD PRESSURE: 122 MMHG | HEIGHT: 66 IN | DIASTOLIC BLOOD PRESSURE: 78 MMHG | HEART RATE: 84 BPM | OXYGEN SATURATION: 97 % | WEIGHT: 243 LBS

## 2024-08-05 DIAGNOSIS — D05.11 DUCTAL CARCINOMA IN SITU (DCIS) OF RIGHT BREAST: ICD-10-CM

## 2024-08-05 DIAGNOSIS — I10 ESSENTIAL HYPERTENSION: ICD-10-CM

## 2024-08-05 DIAGNOSIS — E11.9 TYPE 2 DIABETES MELLITUS WITHOUT COMPLICATION, WITHOUT LONG-TERM CURRENT USE OF INSULIN (MULTI): ICD-10-CM

## 2024-08-05 DIAGNOSIS — D22.9 ATYPICAL MOLE: ICD-10-CM

## 2024-08-05 DIAGNOSIS — G25.0 ESSENTIAL TREMOR: ICD-10-CM

## 2024-08-05 DIAGNOSIS — M17.0 PRIMARY OSTEOARTHRITIS OF BOTH KNEES: ICD-10-CM

## 2024-08-05 DIAGNOSIS — E78.2 MIXED HYPERLIPIDEMIA: ICD-10-CM

## 2024-08-05 PROCEDURE — 3078F DIAST BP <80 MM HG: CPT | Performed by: FAMILY MEDICINE

## 2024-08-05 PROCEDURE — 3044F HG A1C LEVEL LT 7.0%: CPT | Performed by: FAMILY MEDICINE

## 2024-08-05 PROCEDURE — 3008F BODY MASS INDEX DOCD: CPT | Performed by: FAMILY MEDICINE

## 2024-08-05 PROCEDURE — 11300 SHAVE SKIN LESION 0.5 CM/<: CPT | Performed by: FAMILY MEDICINE

## 2024-08-05 PROCEDURE — 99213 OFFICE O/P EST LOW 20 MIN: CPT | Performed by: FAMILY MEDICINE

## 2024-08-05 PROCEDURE — 3074F SYST BP LT 130 MM HG: CPT | Performed by: FAMILY MEDICINE

## 2024-08-05 PROCEDURE — 3048F LDL-C <100 MG/DL: CPT | Performed by: FAMILY MEDICINE

## 2024-08-05 PROCEDURE — 88305 TISSUE EXAM BY PATHOLOGIST: CPT

## 2024-08-05 PROCEDURE — 1036F TOBACCO NON-USER: CPT | Performed by: FAMILY MEDICINE

## 2024-08-05 PROCEDURE — 88304 TISSUE EXAM BY PATHOLOGIST: CPT

## 2024-08-05 NOTE — PROGRESS NOTES
"  Subjective   Patient ID: Emelina Powell is a 64 y.o. female who presents for Lesion Removal (Dark sk L post knee and L back and mid chest epidermoid).  BIOPSY  Covid vax: UTD  Flu: UTD  RSV: UTD  Shingles: UTD     CRC: 2020  Mammogram: 6/2024  Pap: 8/2023  Lmp: n/a  HPI  Patient Active Problem List   Diagnosis    Borderline hypothyroidism    Essential hypertension    Essential tremor    HSV-1 (herpes simplex virus 1) infection    HSV-2 (herpes simplex virus 2) infection    Mixed hyperlipidemia    Primary osteoarthritis of both knees    Rosacea    Type 2 diabetes mellitus without complication, without long-term current use of insulin (Multi)    Personal history of malignant neoplasm of breast    History of syphilis    Ductal carcinoma in situ (DCIS) of right breast    Malignant neoplasm of right female breast, unspecified estrogen receptor status, unspecified site of breast (Multi)     Review of Systems  This patient has   NO history of recent Covid nor flu symptoms,  NO Fever nor chills,  NO Chest pain, shortness of breath nor paroxysmal nocturnal dyspnea,  NO Nausea, vomiting, nor diarrhea,  NO Hematochezia nor melena,  NO Dysuria, hematuria, nor new incontinence issues  NO new severe headaches nor neurological complaints,  NO new issues with anxiety nor depression nor new psychiatric complaints,  NO suicidal nor homicidal ideations.     OBJECTIVE:  /78   Pulse 84   Temp 36.2 °C (97.1 °F) (Temporal)   Resp 16   Ht 1.676 m (5' 6\")   Wt 110 kg (243 lb)   LMP  (LMP Unknown)   SpO2 97%   BMI 39.22 kg/m²      General:  alert, oriented, no acute distress.  No obvious skin rashes noted.   No gait disturbance noted.    Mood is pleasant, not tearful, no signs of emotional distress.  Normal, appropriate behavior.    HEENT: Normocephalic, atraumatic,   Pupils round, reactive to light    Neck: no nuchal rigidity  Respiratory: Equal breath sounds  No wheezes,    rales,    nor rhonchi  No respiratory " distress.    Heart: Regular rate and rhythm, no    murmurs  no rubs/gallops    Abdomen: no masses palpable, no rebound nor guarding, no rebound nor guarding.ovwt    Extremities: NO cyanosis noted, no clubbing.   No edema noted.  Normal-not antalgic, steady gait.    Epidermoid mid chest 3mm  Post calf on r irreg mole 3mm and l mid back 3mm irreg mole    BIOPSY:  The patient was prepped and draped in normal sterile fashion and fully consented for a biopsy of sites identified:                2  cc lidocaine    with     epi total injected          shave biopsy performed     Excellent hemostasis obtained with      electrocautery  EBL  <   2 ml    0 sutures placed    Patient tolerated procedure well no complications or unanticipated events during procedure.    Patient was instructed on wound care, signs and symptoms of infection and instructed to call if occur.  If uncontrolled bleeding-call or ER as this is not anticipated.    Recheck would advised at next scheduled visit-sooner if issues arise.  Lab on 07/10/2024   Component Date Value Ref Range Status    Hemoglobin A1C 07/10/2024 5.9 (H)  see below % Final    Estimated Average Glucose 07/10/2024 123  Not Established mg/dL Final    Vit D, 1,25-Dihydroxy 07/10/2024 37.6  19.9 - 79.3 pg/mL Final    INTERPRETIVE INFORMATION: Vitamin D, 1,25-Dihydroxy    This test is primarily indicated during patient evaluation for   hypercalcemia and renal failure. A normal result does not rule out   Vitamin D deficiency. The recommended test for diagnosing Vitamin   D deficiency is Vitamin D 25-hydroxy.  Performed By: [facility]  [address] [place] [id number]  : Drake Chavarria MD, PhD  CLIA Number: [id number]    Glucose 07/10/2024 104 (H)  74 - 99 mg/dL Final    Sodium 07/10/2024 140  136 - 145 mmol/L Final    Potassium 07/10/2024 4.8  3.5 - 5.3 mmol/L Final    Chloride 07/10/2024 104  98 - 107 mmol/L Final    Bicarbonate 07/10/2024 28  21 - 32  mmol/L Final    Anion Gap 07/10/2024 13  10 - 20 mmol/L Final    Urea Nitrogen 07/10/2024 17  6 - 23 mg/dL Final    Creatinine 07/10/2024 0.70  0.50 - 1.05 mg/dL Final    eGFR 07/10/2024 >90  >60 mL/min/1.73m*2 Final    Calculations of estimated GFR are performed using the 2021 CKD-EPI Study Refit equation without the race variable for the IDMS-Traceable creatinine methods.  https://jasn.asnjournals.org/content/early/2021/09/22/ASN.4531540816    Calcium 07/10/2024 10.0  8.6 - 10.3 mg/dL Final    Albumin 07/10/2024 4.5  3.4 - 5.0 g/dL Final    Alkaline Phosphatase 07/10/2024 121  33 - 136 U/L Final    Total Protein 07/10/2024 6.9  6.4 - 8.2 g/dL Final    AST 07/10/2024 20  9 - 39 U/L Final    Bilirubin, Total 07/10/2024 0.6  0.0 - 1.2 mg/dL Final    ALT 07/10/2024 23  7 - 45 U/L Final    Patients treated with Sulfasalazine may generate falsely decreased results for ALT.    WBC 07/10/2024 5.3  4.4 - 11.3 x10*3/uL Final    nRBC 07/10/2024 0.0  0.0 - 0.0 /100 WBCs Final    RBC 07/10/2024 5.14  4.00 - 5.20 x10*6/uL Final    Hemoglobin 07/10/2024 14.3  12.0 - 16.0 g/dL Final    Hematocrit 07/10/2024 45.0  36.0 - 46.0 % Final    MCV 07/10/2024 88  80 - 100 fL Final    MCH 07/10/2024 27.8  26.0 - 34.0 pg Final    MCHC 07/10/2024 31.8 (L)  32.0 - 36.0 g/dL Final    RDW 07/10/2024 13.2  11.5 - 14.5 % Final    Platelets 07/10/2024 282  150 - 450 x10*3/uL Final    Neutrophils % 07/10/2024 54.1  40.0 - 80.0 % Final    Immature Granulocytes %, Automated 07/10/2024 0.4  0.0 - 0.9 % Final    Immature Granulocyte Count (IG) includes promyelocytes, myelocytes and metamyelocytes but does not include bands. Percent differential counts (%) should be interpreted in the context of the absolute cell counts (cells/UL).    Lymphocytes % 07/10/2024 35.0  13.0 - 44.0 % Final    Monocytes % 07/10/2024 8.2  2.0 - 10.0 % Final    Eosinophils % 07/10/2024 1.5  0.0 - 6.0 % Final    Basophils % 07/10/2024 0.8  0.0 - 2.0 % Final    Neutrophils  Absolute 07/10/2024 2.85  1.20 - 7.70 x10*3/uL Final    Percent differential counts (%) should be interpreted in the context of the absolute cell counts (cells/uL).    Immature Granulocytes Absolute, Au* 07/10/2024 0.02  0.00 - 0.70 x10*3/uL Final    Lymphocytes Absolute 07/10/2024 1.84  1.20 - 4.80 x10*3/uL Final    Monocytes Absolute 07/10/2024 0.43  0.10 - 1.00 x10*3/uL Final    Eosinophils Absolute 07/10/2024 0.08  0.00 - 0.70 x10*3/uL Final    Basophils Absolute 07/10/2024 0.04  0.00 - 0.10 x10*3/uL Final    Ferritin 07/10/2024 84  8 - 150 ng/mL Final    Vitamin B12 07/10/2024 305  211 - 911 pg/mL Final    Magnesium 07/10/2024 1.94  1.60 - 2.40 mg/dL Final        Assessment/Plan     Problem List Items Addressed This Visit       Essential hypertension    Essential tremor    Mixed hyperlipidemia    Primary osteoarthritis of both knees    Type 2 diabetes mellitus without complication, without long-term current use of insulin (Multi)    Ductal carcinoma in situ (DCIS) of right breast     Other Visit Diagnoses       Atypical mole                Patient was informed of wound care and signs and symptoms of infection necessitating a phone call/ office visit or emergency room visit. Expected course of healing and common reactions also discussed. If specimen sent for pathology results should be able to be seen on my chart in 1-2 weeks and or letter sent. If nothing received by 3 weeks time-patient is aware they need to contact office for results as this is not usual.

## 2024-08-08 LAB
LABORATORY COMMENT REPORT: NORMAL
PATH REPORT.FINAL DX SPEC: NORMAL
PATH REPORT.GROSS SPEC: NORMAL
PATH REPORT.MICROSCOPIC SPEC OTHER STN: NORMAL
PATH REPORT.RELEVANT HX SPEC: NORMAL
PATH REPORT.TOTAL CANCER: NORMAL

## 2024-08-18 DIAGNOSIS — S46.011A STRAIN OF RIGHT ROTATOR CUFF CAPSULE, INITIAL ENCOUNTER: ICD-10-CM

## 2024-08-18 DIAGNOSIS — M25.551 RIGHT HIP PAIN: ICD-10-CM

## 2024-08-19 RX ORDER — MELOXICAM 15 MG/1
15 TABLET ORAL DAILY PRN
Qty: 30 TABLET | Refills: 0 | Status: SHIPPED | OUTPATIENT
Start: 2024-08-19

## 2024-08-21 ENCOUNTER — HOSPITAL ENCOUNTER (OUTPATIENT)
Dept: RADIATION ONCOLOGY | Age: 64
Discharge: HOME | End: 2024-08-21
Payer: COMMERCIAL

## 2024-08-21 VITALS
DIASTOLIC BLOOD PRESSURE: 80 MMHG | TEMPERATURE: 96.8 F | OXYGEN SATURATION: 95 % | WEIGHT: 244 LBS | HEART RATE: 83 BPM | SYSTOLIC BLOOD PRESSURE: 123 MMHG | BODY MASS INDEX: 39.38 KG/M2 | RESPIRATION RATE: 18 BRPM

## 2024-08-21 DIAGNOSIS — D05.11 DUCTAL CARCINOMA IN SITU (DCIS) OF RIGHT BREAST: Primary | ICD-10-CM

## 2024-08-21 PROCEDURE — 99212 OFFICE O/P EST SF 10 MIN: CPT | Performed by: RADIOLOGY

## 2024-08-21 PROCEDURE — 99214 OFFICE O/P EST MOD 30 MIN: CPT | Performed by: RADIOLOGY

## 2024-08-21 NOTE — PROGRESS NOTES
NURSING ASSESSMENT     Date: 8/21/2024        Patient Name: Dionne Cruz     YOB: 1960      Age:  64 y.o.      MRN: [id number]       Chaperone [] Yes   [x] No      Advance Directives:   Do you currently have completed advance directives (living will)? [x] Yes   [] No         *If yes, please bring us a copy for your records.  *If no, would you like info or assistance in completing advance directives (living will)?   [] Yes   [x] No    Pain Score:   Pain Score (1-10): 6   Pain Location: right hip and thigh, states had this pain prior to starting anastrozole but the pain is more intense  Pain Duration: with ambulation   Pain Management/Control: doesn't take anything for pain usually but takes aleve rarely      Is pain affecting your ability to take care of yourself or move throughout your home?                        [] Yes   [x] No    General: Fatigue is less since using CPAP  Patient has gained weight [x] Yes   [] No  Patient has lost weight [] Yes   [x] No  How much weight in pounds and over what length of time: has gained 10 lb over the past 6-7 months    Eyes (Ophthalmic): wears glasses     Skin (Dermatological): had cyst removed between breasts      ENT: No Problems     Respiratory: new to CPAP for sleep apnea     Cardiovascular: No Problems      Device   [] Yes   [x] No   Copy of Card Obtained [] Yes   [x] No    Gastrointestinal: No Problems    Genito-Urinary: No Problems     Breast: occasional pulling in right axilla/breast area when lifting arm, mammogram was extremely painful in June     Musculoskeletal: Joint Pain right hip and thigh pain    Neurological: No Problems      Hematological and Lymphatic: No Problems     Endocrine:  pre-Diabetes Mellitus    Gyn History: no problems      A 10-point review of systems  has been conducted and pertinent positives have been   recorded. All other review of systems are negative    Was the patient admitted during the course of treatment OR within 30 days  of treatment? no    Additional Comments: sees Dr Welsh yearly, saw last 8/6/24, next mammogram 6/2025, will see Dr Barillas 1/20/25

## 2024-09-13 ENCOUNTER — OFFICE VISIT (OUTPATIENT)
Dept: INFECTIOUS DISEASES | Age: 64
End: 2024-09-13
Payer: COMMERCIAL

## 2024-09-13 VITALS
HEIGHT: 67 IN | WEIGHT: 245 LBS | SYSTOLIC BLOOD PRESSURE: 139 MMHG | DIASTOLIC BLOOD PRESSURE: 69 MMHG | HEART RATE: 81 BPM | BODY MASS INDEX: 38.45 KG/M2 | TEMPERATURE: 97.1 F

## 2024-09-13 DIAGNOSIS — A53.9 SYPHILIS: Primary | ICD-10-CM

## 2024-09-13 DIAGNOSIS — E11.9 TYPE 2 DIABETES MELLITUS WITHOUT COMPLICATION, WITHOUT LONG-TERM CURRENT USE OF INSULIN (MULTI): Primary | ICD-10-CM

## 2024-09-13 PROCEDURE — 3075F SYST BP GE 130 - 139MM HG: CPT | Performed by: INTERNAL MEDICINE

## 2024-09-13 PROCEDURE — 3078F DIAST BP <80 MM HG: CPT | Performed by: INTERNAL MEDICINE

## 2024-09-13 PROCEDURE — 99213 OFFICE O/P EST LOW 20 MIN: CPT | Performed by: INTERNAL MEDICINE

## 2024-09-13 ASSESSMENT — ENCOUNTER SYMPTOMS
RESPIRATORY NEGATIVE: 1
GASTROINTESTINAL NEGATIVE: 1

## 2024-09-13 ASSESSMENT — PATIENT HEALTH QUESTIONNAIRE - PHQ9
SUM OF ALL RESPONSES TO PHQ QUESTIONS 1-9: 0
SUM OF ALL RESPONSES TO PHQ QUESTIONS 1-9: 0
1. LITTLE INTEREST OR PLEASURE IN DOING THINGS: NOT AT ALL
SUM OF ALL RESPONSES TO PHQ9 QUESTIONS 1 & 2: 0
SUM OF ALL RESPONSES TO PHQ QUESTIONS 1-9: 0
SUM OF ALL RESPONSES TO PHQ QUESTIONS 1-9: 0
2. FEELING DOWN, DEPRESSED OR HOPELESS: NOT AT ALL

## 2024-09-17 ENCOUNTER — APPOINTMENT (OUTPATIENT)
Dept: PHARMACY | Facility: HOSPITAL | Age: 64
End: 2024-09-17
Payer: COMMERCIAL

## 2024-09-17 DIAGNOSIS — E78.2 MIXED HYPERLIPIDEMIA: ICD-10-CM

## 2024-09-17 DIAGNOSIS — E11.9 TYPE 2 DIABETES MELLITUS WITHOUT COMPLICATION, WITHOUT LONG-TERM CURRENT USE OF INSULIN (MULTI): ICD-10-CM

## 2024-09-17 RX ORDER — TIRZEPATIDE 2.5 MG/.5ML
2.5 INJECTION, SOLUTION SUBCUTANEOUS WEEKLY
Qty: 2 ML | Refills: 0 | Status: SHIPPED | OUTPATIENT
Start: 2024-09-17

## 2024-09-17 RX ORDER — ATORVASTATIN CALCIUM 20 MG/1
TABLET, FILM COATED ORAL
Qty: 90 TABLET | Refills: 3 | Status: SHIPPED | OUTPATIENT
Start: 2024-09-17

## 2024-09-17 RX ORDER — LORATADINE 10 MG/1
10 TABLET ORAL DAILY
COMMUNITY

## 2024-09-17 NOTE — ASSESSMENT & PLAN NOTE
Patient's goal A1c is < 7%.  Is pt at goal? Yes, 5.9% on 7.10.24  Patient's SMBGs are not available today, but likely at goal.     Rationale for plan: Will plan to start Mounjaro today for added weight loss benefits and glycemic control.    Medication Changes:  CONTINUE:  Metformin 500 mg daily  Jardiance 10 mg daily  START  Mounjaro 2.5 mg subcutaneously once weekly    Future Considerations:  Will plan to titrate Mounjaro as needed    Monitoring and Education:  Mounjaro Education:   Counseled patient on Mounjaro MOA, expectations, side effects, duration of therapy, administration, and monitoring parameters.  Provided detailed dosing and administration counseling to ensure proper technique.   Reviewed Mounjaro titration schedule, starting with 2.5 mg once weekly to a goal of 15 mg once weekly if tolerated  Counseled patient on the benefits of GLP-1ra glycemic control and weight loss  Reviewed storage requirements of Mounjaro when not in use  Advised patient that they may experience improved satiety after meals and portion sizes of meals may be reduced as doses of Mounjaro increase.  Answered all patient questions and concerns

## 2024-09-17 NOTE — PROGRESS NOTES
Clinical Pharmacy Appointment    Patient ID: Emelina Powell is a 64 y.o. female who presents for Diabetes and Weight Loss.    Pt is here for First appointment.     Referring Provider: Destiny Almendarez, *  PCP: Destiny Almendarez MD   Last visit with PCP: 7.10.24  Next visit with PCP: 1.14.25    Subjective   HPI  DIABETES MELLITUS TYPE 2:    Known diabetic complications: HTN, HLD, osteoarthritis, hx of breast cancer.  Does patient follow with Endocrinology: No  Last optometry exam: Not assessed today  Most recent visit in Podiatry: Not assessed today     Current diabetic medications include:  Metformin 500 mg daily  Jardiance 10 mg daily    Clarifications to above regimen: None  Adverse Effects: Denies    Glucose Readings:  Glucometer/CGM Type: Does not test blood glucose    Any episodes of hypoglycemia? No, patient denies .  Did patient treat episode of hypoglycemia appropriately? N/A  Does pt have proteinuria? No    Lifestyle:  Diet: Has seen dietician in the past  Reports this is the most difficult for her  Enjoys ice cream  Physical Activity: does strength training 2-3 times per week, less cardio    Secondary Prevention:  Statin? Yes  ACE-I/ARB? No  Aspirin? No    Pertinent PMH Review:  PMH of Pancreatitis: No  PMH of Retinopathy: No  PMH of Urinary Tract Infections: No  PMH of MTC: No    Drug Interactions  No relevant drug interactions were noted.    Objective   No Known Allergies  Social History     Social History Narrative    Not on file      Medication Review  Current Outpatient Medications   Medication Instructions    anastrozole (ARIMIDEX) 1 mg, oral, Daily    atorvastatin (Lipitor) 20 mg tablet TAKE 1 TABLET BY MOUTH EVERY DAY    biotin 5 mg tablet oral    Ca comb no.1/vit D3/B6/FA/B12 (VITAMIN D3, CALCIUM CIT-PHOS, ORAL) oral    doxycycline (VIBRAMYCIN) 100 mg, oral, Daily    empagliflozin (JARDIANCE) 10 mg, oral, Daily    levothyroxine (Synthroid, Levoxyl) 25 mcg tablet TAKE 1 TABLET BY  MOUTH EVERY DAY AS DIRECTED    loratadine (CLARITIN) 10 mg, oral, Daily    meloxicam (MOBIC) 15 mg, oral, Daily PRN    metFORMIN (GLUCOPHAGE) 500 mg, oral, Nightly    Mounjaro 2.5 mg, subcutaneous, Weekly    spironolactone (ALDACTONE) 25 mg, oral, Daily    valACYclovir (Valtrex) 500 mg tablet 1 tablet, oral, 2 times daily      Vitals  BP Readings from Last 2 Encounters:   08/05/24 122/78   07/10/24 108/66     BMI Readings from Last 1 Encounters:   08/05/24 39.22 kg/m²      Labs  A1C  Lab Results   Component Value Date    HGBA1C 5.9 (H) 07/10/2024    HGBA1C 5.7 (H) 03/20/2024    HGBA1C 5.7 (A) 09/29/2023     BMP  Lab Results   Component Value Date    CALCIUM 10.0 07/10/2024     07/10/2024    K 4.8 07/10/2024    CO2 28 07/10/2024     07/10/2024    BUN 17 07/10/2024    CREATININE 0.70 07/10/2024    EGFR >90 07/10/2024     LFTs  Lab Results   Component Value Date    ALT 23 07/10/2024    AST 20 07/10/2024    ALKPHOS 121 07/10/2024    BILITOT 0.6 07/10/2024     FLP  Lab Results   Component Value Date    TRIG 228 (H) 03/20/2024    CHOL 158 03/20/2024    LDLF 60 07/06/2023    LDLCALC 59 03/20/2024    HDL 53.3 03/20/2024     Urine Microalbumin  Lab Results   Component Value Date    MICROALBCREA SEE COMMENT 09/29/2023     Weight Management  Wt Readings from Last 3 Encounters:   08/05/24 110 kg (243 lb)   07/10/24 108 kg (237 lb)   01/02/24 106 kg (232 lb 14.4 oz)      There is no height or weight on file to calculate BMI.     Assessment/Plan   Problem List Items Addressed This Visit       Type 2 diabetes mellitus without complication, without long-term current use of insulin (Multi)     Patient's goal A1c is < 7%.  Is pt at goal? Yes, 5.9% on 7.10.24  Patient's SMBGs are not available today, but likely at goal.     Rationale for plan: Will plan to start Mounjaro today for added weight loss benefits and glycemic control.    Medication Changes:  CONTINUE:  Metformin 500 mg daily  Jardiance 10 mg  daily  START  Mounjaro 2.5 mg subcutaneously once weekly    Future Considerations:  Will plan to titrate Mounjaro as needed    Monitoring and Education:  Mounjaro Education:   Counseled patient on Mounjaro MOA, expectations, side effects, duration of therapy, administration, and monitoring parameters.  Provided detailed dosing and administration counseling to ensure proper technique.   Reviewed Mounjaro titration schedule, starting with 2.5 mg once weekly to a goal of 15 mg once weekly if tolerated  Counseled patient on the benefits of GLP-1ra glycemic control and weight loss  Reviewed storage requirements of Mounjaro when not in use  Advised patient that they may experience improved satiety after meals and portion sizes of meals may be reduced as doses of Mounjaro increase.  Answered all patient questions and concerns           Relevant Medications    tirzepatide (Mounjaro) 2.5 mg/0.5 mL pen injector       Clinical Pharmacist follow-up: 10.15.24 @ 12 pm, Telehealth visit    Continue all meds under the continuation of care with the referring provider and clinical pharmacy team.    Thank you,  Linda Khoury, PharmD  Clinical Pharmacist  [phone]    Verbal consent to manage patient's drug therapy was obtained from the patient. They were informed they may decline to participate or withdraw from participation in pharmacy services at any time.

## 2024-09-18 DIAGNOSIS — M25.551 RIGHT HIP PAIN: ICD-10-CM

## 2024-09-18 DIAGNOSIS — S46.011A STRAIN OF RIGHT ROTATOR CUFF CAPSULE, INITIAL ENCOUNTER: ICD-10-CM

## 2024-09-18 DIAGNOSIS — L65.9 NONSCARRING HAIR LOSS, UNSPECIFIED: ICD-10-CM

## 2024-09-18 RX ORDER — MELOXICAM 15 MG/1
15 TABLET ORAL DAILY PRN
Qty: 90 TABLET | Refills: 3 | Status: SHIPPED | OUTPATIENT
Start: 2024-09-18

## 2024-09-18 RX ORDER — SPIRONOLACTONE 25 MG/1
25 TABLET ORAL DAILY
Qty: 90 TABLET | Refills: 3 | Status: SHIPPED | OUTPATIENT
Start: 2024-09-18

## 2024-10-14 NOTE — PROGRESS NOTES
Subjective  Alondra Search, 62 y.o. female presents today with:  Chief Complaint   Patient presents with    Hypertension    Hyperlipidemia    Hyperglycemia    Follow-up     on the Bronchitis from July 30, 2018. Pt said that it may be coming back on again        hba1c 6  Diet and exercise addressed  Abscess on nasolabial area      Past Medical History:   Diagnosis Date    Cancer ([address]     BASAL CELL    Hayfever     Hyperlipidemia     Hypertension     Obesity     Rosacea     Vitamin D deficiency      Past Surgical History:   Procedure Laterality Date    COLONOSCOPY  2011    negative-[facility]    MOHS SURGERY  2008     Social History     Social History    Marital status:      Spouse name: N/A    Number of children: N/A    Years of education: N/A     Occupational History    Not on file. Social History Main Topics    Smoking status: Never Smoker    Smokeless tobacco: Never Used    Alcohol use No    Drug use: Unknown    Sexual activity: Not on file     Other Topics Concern    Not on file     Social History Narrative    No narrative on file     Family History   Problem Relation Age of Onset    Cancer Mother         BREAST    High Blood Pressure Mother     Parkinsonism Mother     Diabetes Mother      No Known Allergies  Current Outpatient Prescriptions   Medication Sig Dispense Refill    adapalene (DIFFERIN) 0.1 % cream Apply topically nightly.  30 g 3    sulfamethoxazole-trimethoprim (BACTRIM DS) 800-160 MG per tablet Take 1 tablet by mouth 2 times daily for 7 days 14 tablet 0    budesonide-formoterol (SYMBICORT) 80-4.5 MCG/ACT AERO Inhale 2 puffs into the lungs 2 times daily 1 Inhaler 3    doxycycline hyclate (VIBRAMYCIN) 100 MG capsule TAKE ONE CAPSULE BY MOUTH TWICE DAILY  3    albuterol sulfate HFA (PROVENTIL HFA) 108 (90 Base) MCG/ACT inhaler Inhale 2 puffs into the lungs every 6 hours as needed for Wheezing 1 Inhaler 1    atorvastatin (LIPITOR) 20 MG tablet Take 1 tablet by mouth daily 90 tablet 1    hydrochlorothiazide (MICROZIDE) 12.5 MG capsule TAKE ONE CAPSULE BY MOUTH EVERY DAY 90 capsule 1    metFORMIN (GLUCOPHAGE) 500 MG tablet Take 1 tablet by mouth nightly 90 tablet 1    SOOLANTRA 1 % CREA APPLY TO AFFECTED AREAS EVERY DAY  5    fluticasone (FLONASE) 50 MCG/ACT nasal spray 1 spray by Nasal route daily 1 Bottle 3    Calcium Carbonate-Vit D-Min 1705-2272 MG-UNIT CHEW Take 1 tablet by mouth daily. 90 tablet 3     No current facility-administered medications for this visit. The patient denies any history of      seizures,             heart attack or KNOWN CAD        or stroke. No chest pain, shortness of breath, paroxysmal nocturnal dyspnea. No nausea, vomiting, diarrhea, hematochezia or melena. No paresthesias or headaches. No dysuria, frequency or hematuria. Last labs  No visits with results within 3 Month(s) from this visit. Latest known visit with results is:   Orders Only on 03/09/2018   Component Date Value Ref Range Status    Cholesterol, Total 03/09/2018 172  0 - 199 mg/dL Final    ATP III Cholesterol classification is Desirable.  Triglycerides 03/09/2018 363* 0 - 200 mg/dL Final    ATP III Triglycerides Classification is High.  HDL 03/09/2018 45  40 - 59 mg/dL Final    Comment: ATP III HDL Cholesterol Classification is Desirable. Expected Values:    Males:    >55 = No Risk            35-55 = Moderate Risk            <35 = High Risk    Females:  >65 = No Risk            45-65 = Moderate Risk            <45 = High Risk    NCEP Guidelines:   Third Report May 2001  >59 = negative risk factor for CHD  <40 = major risk factor for CHD      LDL Calculated 03/09/2018 54  0 - 129 mg/dL Final    ATP III LDL Classification is Optimal.    WBC 03/09/2018 6.6  4.8 - 10.8 K/uL Final    RBC 03/09/2018 4.84  4.20 - 5.40 M/uL Final    Hemoglobin 03/09/2018 13.5  12.0 - 16.0 g/dL Final    Hematocrit 03/09/2018 41.6  37.0 - 47.0 % Final    MCV 03/09/2018 85.9  82.0 - 100.0 fL Final    MCH 03/09/2018 27.9  27.0 - 31.3 pg Final    MCHC 03/09/2018 32.5* 33.0 - 37.0 % Final    RDW 03/09/2018 14.8* 11.5 - 14.5 % Final    Platelets 11/44/3088 281  130 - 400 K/uL Final    Hemoglobin A1C 03/09/2018 6.0* 4.8 - 5.9 % Final    Sodium 03/09/2018 140  132 - 144 mEq/L Final    Potassium 03/09/2018 4.6  3.5 - 5.1 mEq/L Final    Chloride 03/09/2018 102  98 - 107 mEq/L Final    CO2 03/09/2018 24  22 - 29 mEq/L Final    Anion Gap 03/09/2018 14* 7 - 13 mEq/L Final    Glucose 03/09/2018 97  74 - 109 mg/dL Final    BUN 03/09/2018 16  6 - 20 mg/dL Final    CREATININE 03/09/2018 0.57  0.50 - 0.90 mg/dL Final    GFR Non- 03/09/2018 >60.0  >60 Final    Comment: >60 mL/min/1.73m2 EGFR, calc. for ages 25 and older using the  MDRD formula (not corrected for weight), is valid for stable  renal function.  GFR  03/09/2018 >60.0  >60 Final    Comment: >60 mL/min/1.73m2 EGFR, calc. for ages 25 and older using the  MDRD formula (not corrected for weight), is valid for stable  renal function.       Calcium 03/09/2018 9.5  8.6 - 10.2 mg/dL Final    Total Protein 03/09/2018 6.9  6.4 - 8.1 g/dL Final    Alb 03/09/2018 4.4  3.9 - 4.9 g/dL Final    Total Bilirubin 03/09/2018 0.4  0.0 - 1.2 mg/dL Final    Alkaline Phosphatase 03/09/2018 129  40 - 130 U/L Final    ALT 03/09/2018 26  0 - 33 U/L Final    AST 03/09/2018 27  0 - 35 U/L Final    Globulin 03/09/2018 2.5  2.3 - 3.5 g/dL Final     Health Maintenance   Topic Date Due    Hepatitis C screen  1960    HIV screen  07/04/1975    Flu vaccine (1) 09/01/2018    DTaP/Tdap/Td vaccine (1 - Tdap) 03/09/2019 (Originally 7/4/1979)    Shingles Vaccine (1 of 2 - 2 Dose Series) 03/09/2019 (Originally 7/4/2010)    A1C test (Diabetic or Prediabetic)  03/09/2019    Potassium monitoring  03/09/2019    Creatinine monitoring  03/09/2019    Breast cancer screen  06/09/2019    Cervical cancer screen  03/08/2020    Colon cancer screen colonoscopy  12/28/2021    Lipid screen  03/09/2023       No results found for this visit on 09/07/18. Objective      Wt Readings from Last 3 Encounters:   09/07/18 244 lb 12.8 oz (111 kg)   07/30/18 240 lb (108.9 kg)   03/09/18 241 lb (109.3 kg)     Temp Readings from Last 3 Encounters:   09/07/18 96.2 °F (35.7 °C) (Tympanic)   07/30/18 98.3 °F (36.8 °C) (Oral)   03/09/18 97.2 °F (36.2 °C)     BP Readings from Last 3 Encounters:   09/07/18 118/82   07/30/18 112/60   03/09/18 110/80     Pulse Readings from Last 3 Encounters:   09/07/18 69   07/30/18 80   03/09/18 66       PHYSICAL EXAMINATION:        GENERAL:    The patient appears well nourished and well-developed,     Normal affect. Not appearing significantly anxious or depressed. No acute respiratory distress. Alert and oriented times 3. Skin:     No skin rashes. No concerning moles observed. Gait:    Normal gait. No ataxia. HEENT:  Normocephalic, atraumatic. Throat:  Pharynx is clear, no erythema/ edema or exudates   Ears:    TMs normal bilaterally. Canals and ears normal   Eyes:  Extraocular eye motions intact and pain free. Pupils reactive/equal    Sclerae and conjunctivae clear    NECK: No masses or adenopathy palpable. No carotid bruits heard. No asymmetry visible. No thyromegaly. RESPIRATORY:   Clear/ Equal breath sounds /No acute respiratory distress. No wheezes,rales, or rhonchi. No percussive abnormalities    HEART: Regular rhythm without murmur, rub or gallop. ABDOMEN:  overwt Soft, non tender. No masses, guarding or rebound. Normo active bowel sounds. EXTREMITIES:  No edema in any extremity. No cyanosis or clubbing. 2+ dorsalis pedis pulses bilaterally    4-5mm abscess with some purulent drainage  Submental node mildly prominent 1.5 cm mobile        Assessment & Plan    Diagnosis Orders   1.  Prediabetes  Hemoglobin A1C Comprehensive Metabolic Panel   2. Essential hypertension     3. Mixed hyperlipidemia     4. Encounter for screening mammogram for breast cancer  BRIANNA DIGITAL SCREEN W CAD BILATERAL   5. Rosacea  Referral To Unknown External Dermatology     Orders Placed This Encounter   Procedures    BRIANNA DIGITAL SCREEN W CAD BILATERAL     Standing Status:   Future     Standing Expiration Date:   11/7/2019     Order Specific Question:   Reason for exam:     Answer:   screening    Hemoglobin A1C     Standing Status:   Future     Standing Expiration Date:   9/7/2019    Comprehensive Metabolic Panel     Standing Status:   Future     Standing Expiration Date:   9/7/2018    Referral To Unknown External Dermatology     Referral Priority:   Routine     Referral Type:   Eval and Treat     Referral Reason:   Specialty Services Required     Requested Specialty:   Dermatology     Number of Visits Requested:   1     Orders Placed This Encounter   Medications    adapalene (DIFFERIN) 0.1 % cream     Sig: Apply topically nightly. Dispense:  30 g     Refill:  3    sulfamethoxazole-trimethoprim (BACTRIM DS) 800-160 MG per tablet     Sig: Take 1 tablet by mouth 2 times daily for 7 days     Dispense:  14 tablet     Refill:  0    budesonide-formoterol (SYMBICORT) 80-4.5 MCG/ACT AERO     Sig: Inhale 2 puffs into the lungs 2 times daily     Dispense:  1 Inhaler     Refill:  3   There are no discontinued medications. No Follow-up on file. reqs derm refer    Check labs  Add symbicort  Ru Ingram is advised to follow up ASAP if condition deteriorates or problems arise and if no information on test results to patient in the next 1 month they are advised to call us. The patient was reminded that an antibiotic has been prescribed the predicted course is improvement to cure with no persistent issues. Take antibiotics as directed. If any problems occur, an appointment should be made or ER visit if severe.  Because of the risk with ANY antibiotic of C. Immune Suppressed

## 2024-10-15 ENCOUNTER — APPOINTMENT (OUTPATIENT)
Dept: PHARMACY | Facility: HOSPITAL | Age: 64
End: 2024-10-15
Payer: COMMERCIAL

## 2024-10-15 DIAGNOSIS — E11.9 TYPE 2 DIABETES MELLITUS WITHOUT COMPLICATION, WITHOUT LONG-TERM CURRENT USE OF INSULIN (MULTI): ICD-10-CM

## 2024-10-15 RX ORDER — TIRZEPATIDE 5 MG/.5ML
5 INJECTION, SOLUTION SUBCUTANEOUS WEEKLY
Qty: 2 ML | Refills: 2 | Status: SHIPPED | OUTPATIENT
Start: 2024-10-15

## 2024-10-15 RX ORDER — TIRZEPATIDE 2.5 MG/.5ML
2.5 INJECTION, SOLUTION SUBCUTANEOUS WEEKLY
Qty: 6 ML | Refills: 0 | Status: SHIPPED | OUTPATIENT
Start: 2024-10-15

## 2024-10-15 NOTE — PROGRESS NOTES
Clinical Pharmacy Appointment    Patient ID: Emelina Powell is a 64 y.o. female who presents for Diabetes and Weight Loss    Pt is here for Follow Up appointment.     Referring Provider: Destiny Almendarze, *  PCP: Destiny Almendarez MD   Last visit with PCP: 7.10.24  Next visit with PCP: 25    Subjective   Interval History:  Started Mounjaro 2.5 mg weekly; completed 4th injection  Increasing water intake and protein  SE  Improving nausea  No vomiting or diarrhea  Slight constipation  Appetite suppression; lessened over time  Walking more  Has 5k this Saturday  Weight loss since startin pounds  Thrilled with results    HPI  DIABETES MELLITUS TYPE 2:    Known diabetic complications: HTN, HLD, osteoarthritis, hx of breast cancer.  Does patient follow with Endocrinology: No  Last optometry exam: Not assessed today  Most recent visit in Podiatry: Not assessed today     Current diabetic medications include:  Metformin 500 mg daily  Jardiance 10 mg daily  Mounjaro 2.5 mg subcutaneously once weekly    Clarifications to above regimen: None  Adverse Effects: Denies    Glucose Readings:  Glucometer/CGM Type: Does not test blood glucose    Any episodes of hypoglycemia? No, patient denies .  Did patient treat episode of hypoglycemia appropriately? N/A  Does pt have proteinuria? No    Lifestyle:  Diet: Has seen dietician in the past  Reports this is the most difficult for her  Enjoys ice cream  Physical Activity: does strength training 2-3 times per week, less cardio    Secondary Prevention:  Statin? Yes  ACE-I/ARB? No  Aspirin? No    Pertinent PMH Review:  PMH of Pancreatitis: No  PMH of Retinopathy: No  PMH of Urinary Tract Infections: No  PMH of MTC: No    Drug Interactions  No relevant drug interactions were noted.    Objective   No Known Allergies  Social History     Social History Narrative    Not on file      Medication Review  Current Outpatient Medications   Medication Instructions    anastrozole  (ARIMIDEX) 1 mg, oral, Daily    atorvastatin (Lipitor) 20 mg tablet TAKE 1 TABLET BY MOUTH EVERY DAY    biotin 5 mg tablet oral    Ca comb no.1/vit D3/B6/FA/B12 (VITAMIN D3, CALCIUM CIT-PHOS, ORAL) oral    doxycycline (VIBRAMYCIN) 100 mg, oral, Daily    empagliflozin (JARDIANCE) 10 mg, oral, Daily    levothyroxine (Synthroid, Levoxyl) 25 mcg tablet TAKE 1 TABLET BY MOUTH EVERY DAY AS DIRECTED    loratadine (CLARITIN) 10 mg, oral, Daily    meloxicam (MOBIC) 15 mg, oral, Daily PRN    metFORMIN (GLUCOPHAGE) 500 mg, oral, Nightly    Mounjaro 2.5 mg, subcutaneous, Weekly    Mounjaro 2.5 mg, subcutaneous, Weekly    Mounjaro 5 mg, subcutaneous, Weekly    spironolactone (ALDACTONE) 25 mg, oral, Daily    valACYclovir (Valtrex) 500 mg tablet 1 tablet, oral, 2 times daily      Vitals  BP Readings from Last 2 Encounters:   08/05/24 122/78   07/10/24 108/66     BMI Readings from Last 1 Encounters:   08/05/24 39.22 kg/m²      Labs  A1C  Lab Results   Component Value Date    HGBA1C 5.9 (H) 07/10/2024    HGBA1C 5.7 (H) 03/20/2024    HGBA1C 5.7 (A) 09/29/2023     BMP  Lab Results   Component Value Date    CALCIUM 10.0 07/10/2024     07/10/2024    K 4.8 07/10/2024    CO2 28 07/10/2024     07/10/2024    BUN 17 07/10/2024    CREATININE 0.70 07/10/2024    EGFR >90 07/10/2024     LFTs  Lab Results   Component Value Date    ALT 23 07/10/2024    AST 20 07/10/2024    ALKPHOS 121 07/10/2024    BILITOT 0.6 07/10/2024     FLP  Lab Results   Component Value Date    TRIG 228 (H) 03/20/2024    CHOL 158 03/20/2024    LDLF 60 07/06/2023    LDLCALC 59 03/20/2024    HDL 53.3 03/20/2024     Urine Microalbumin  Lab Results   Component Value Date    MICROALBCREA SEE COMMENT 09/29/2023     Weight Management  Wt Readings from Last 3 Encounters:   08/05/24 110 kg (243 lb)   07/10/24 108 kg (237 lb)   01/02/24 106 kg (232 lb 14.4 oz)      There is no height or weight on file to calculate BMI.     Assessment/Plan   Problem List Items Addressed  This Visit       Type 2 diabetes mellitus without complication, without long-term current use of insulin (Multi)     Patient's goal A1c is < 7%.  Is pt at goal? Yes, 5.9% on 7.10.24  Patient's SMBGs are not available today, but likely at goal.     Rationale for plan: Will plan to increase Mounjaro today for added weight loss benefits and glycemic control.    Medication Changes:  CONTINUE:  Metformin 500 mg daily  Jardiance 10 mg daily  Increase:  Mounjaro 5 mg subcutaneously once weekly    Future Considerations:  Will plan to titrate Mounjaro as needed    Monitoring and Education:  Mounjaro Education:   Counseled patient on Mounjaro MOA, expectations, side effects, duration of therapy, administration, and monitoring parameters.  Provided detailed dosing and administration counseling to ensure proper technique.   Reviewed Mounjaro titration schedule, starting with 2.5 mg once weekly to a goal of 15 mg once weekly if tolerated  Counseled patient on the benefits of GLP-1ra glycemic control and weight loss  Reviewed storage requirements of Mounjaro when not in use  Advised patient that they may experience improved satiety after meals and portion sizes of meals may be reduced as doses of Mounjaro increase.  Answered all patient questions and concerns           Relevant Medications    tirzepatide (Mounjaro) 2.5 mg/0.5 mL pen injector    tirzepatide (Mounjaro) 5 mg/0.5 mL pen injector     Clinical Pharmacist follow-up: 12.17.24 @ 12 pm, Telehealth visit    Continue all meds under the continuation of care with the referring provider and clinical pharmacy team.    Thank you,  Linda Khoury, PharmD  Clinical Pharmacist  [phone]    Verbal consent to manage patient's drug therapy was obtained from the patient. They were informed they may decline to participate or withdraw from participation in pharmacy services at any time.

## 2024-10-18 NOTE — ASSESSMENT & PLAN NOTE
Patient's goal A1c is < 7%.  Is pt at goal? Yes, 5.9% on 7.10.24  Patient's SMBGs are not available today, but likely at goal.     Rationale for plan: Will plan to increase Mounjaro today for added weight loss benefits and glycemic control.    Medication Changes:  CONTINUE:  Metformin 500 mg daily  Jardiance 10 mg daily  Increase:  Mounjaro 5 mg subcutaneously once weekly    Future Considerations:  Will plan to titrate Mounjaro as needed    Monitoring and Education:  Mounjaro Education:   Counseled patient on Mounjaro MOA, expectations, side effects, duration of therapy, administration, and monitoring parameters.  Provided detailed dosing and administration counseling to ensure proper technique.   Reviewed Mounjaro titration schedule, starting with 2.5 mg once weekly to a goal of 15 mg once weekly if tolerated  Counseled patient on the benefits of GLP-1ra glycemic control and weight loss  Reviewed storage requirements of Mounjaro when not in use  Advised patient that they may experience improved satiety after meals and portion sizes of meals may be reduced as doses of Mounjaro increase.  Answered all patient questions and concerns

## 2024-10-31 ENCOUNTER — TELEPHONE (OUTPATIENT)
Dept: PRIMARY CARE | Facility: CLINIC | Age: 64
End: 2024-10-31
Payer: COMMERCIAL

## 2024-11-09 DIAGNOSIS — E11.9 TYPE 2 DIABETES MELLITUS WITHOUT COMPLICATION, WITHOUT LONG-TERM CURRENT USE OF INSULIN (MULTI): ICD-10-CM

## 2024-11-11 RX ORDER — TIRZEPATIDE 2.5 MG/.5ML
2.5 INJECTION, SOLUTION SUBCUTANEOUS
OUTPATIENT
Start: 2024-11-17

## 2024-11-12 NOTE — PROGRESS NOTES
[facility]           Radiation Oncology      [address] [place], [place] [address]        O: [phone]        F: [phone]       [facility]                   Dr. Js Peters MD PhD    FOLLOW-UP NOTE     Date of Service: 2024  Patient ID: Dionne Cruz   : 1960  MRN: [id number]   Acct Number: [id number]       NAME:  Dionne Cruz    :  1960 64 y.o. female     PCP: Johanna Reese MD    REFERRING PROVIDER: Eran    DIAGNOSIS:  1. Ductal carcinoma in situ (DCIS) of right breast        STAGING: Cancer Staging   Ductal carcinoma in situ (DCIS) of right breast  Staging form: Breast, AJCC 8th Edition  - Clinical: Stage 0 (cTis (DCIS), cN0, cM0, G2, ER+, MO+) - Signed by Chely Barillas MD on 2023      PRIOR TREATMENT: 4256 cGy in 16 fractions to the right breast followed by a sequential boost to the tumor bed to 1000 cGy in 4 fractions for cumulative dose to 5256 cGy.      TIME SINCE TREATMENT: Approximately 1 year     RECENT HISTORY: Dionne Cruz returns for routine follow-up approximately 12 months status post completion of adjuvant radiation therapy for the above diagnosis.  Her skin has fully healed and she notices only mild fibrosis and hyperpigmentation.  She does report a 10 pound weight gain and was recently started on CPAP.  She does have occasional fatigue but otherwise denies any complaints.      She had a repeat bilateral mammogram on 2024 which was read as BI-RADS 2, benign.  She is on anastrozole under the care of medical oncology and is tolerating well.     Past medical, surgical, social and family histories reviewed and updated as indicated.    ALLERGIES:  Seasonal       MEDICATIONS:   Current Outpatient Medications:     Loratadine (CLARITIN PO), Take 1 tablet by mouth daily, Disp: , Rfl:     anastrozole (ARIMIDEX) 1 MG tablet, Take 1 tablet by mouth daily, Disp: , Rfl:     meloxicam (MOBIC) 15 MG tablet, Take 1

## 2024-12-17 ENCOUNTER — APPOINTMENT (OUTPATIENT)
Dept: PHARMACY | Facility: HOSPITAL | Age: 64
End: 2024-12-17
Payer: COMMERCIAL

## 2024-12-20 ENCOUNTER — APPOINTMENT (OUTPATIENT)
Dept: PHARMACY | Facility: HOSPITAL | Age: 64
End: 2024-12-20
Payer: COMMERCIAL

## 2024-12-20 DIAGNOSIS — E11.9 TYPE 2 DIABETES MELLITUS WITHOUT COMPLICATION, WITHOUT LONG-TERM CURRENT USE OF INSULIN (MULTI): ICD-10-CM

## 2024-12-20 RX ORDER — TIRZEPATIDE 5 MG/.5ML
5 INJECTION, SOLUTION SUBCUTANEOUS WEEKLY
Qty: 6 ML | Refills: 0 | Status: SHIPPED | OUTPATIENT
Start: 2024-12-20

## 2024-12-20 NOTE — PROGRESS NOTES
Clinical Pharmacy Appointment    Patient ID: Emelina Powell is a 64 y.o. female who presents for Diabetes.    Pt is here for Follow Up appointment.     Referring Provider: Destiny Almendarez, *  PCP: Destiny Almendarez MD   Last visit with PCP: 7.10.24  Next visit with PCP: 1.14.25    Subjective   Interval History:  Increased to Mounjaro 5 mg weekly  SE:  States some nausea still  Did vomit x2  Still constipated; using stool softener   Appetite suppression has lessened over time  Pilates while on vacation  Weight loss since starting: ~12 pounds  Mounjaro PA denied due to not meeting insurance requirements    HPI  DIABETES MELLITUS TYPE 2:    Known diabetic complications: HTN, HLD, osteoarthritis, hx of breast cancer.  Does patient follow with Endocrinology: No  Last optometry exam: Not assessed today  Most recent visit in Podiatry: Not assessed today     Current diabetic medications include:  Metformin 500 mg daily  Jardiance 10 mg daily  Mounjaro 5 mg subcutaneously once weekly    Clarifications to above regimen: None  Adverse Effects: Denies    Glucose Readings:  Glucometer/CGM Type: Does not test blood glucose    Any episodes of hypoglycemia? No, patient denies .  Did patient treat episode of hypoglycemia appropriately? N/A  Does pt have proteinuria? No    Lifestyle:  Diet: Has seen dietician in the past  Reports this is the most difficult for her  Enjoys ice cream  Physical Activity: does strength training 2-3 times per week, less cardio    Secondary Prevention:  Statin? Yes  ACE-I/ARB? No  Aspirin? No    Pertinent PMH Review:  PMH of Pancreatitis: No  PMH of Retinopathy: No  PMH of Urinary Tract Infections: No  PMH of MTC: No    Drug Interactions  No relevant drug interactions were noted.    Objective   No Known Allergies  Social History     Social History Narrative    Not on file      Medication Review  Current Outpatient Medications   Medication Instructions    anastrozole (ARIMIDEX) 1 mg, oral,  Daily    atorvastatin (Lipitor) 20 mg tablet TAKE 1 TABLET BY MOUTH EVERY DAY    biotin 5 mg tablet oral    Ca comb no.1/vit D3/B6/FA/B12 (VITAMIN D3, CALCIUM CIT-PHOS, ORAL) oral    doxycycline (VIBRAMYCIN) 100 mg, oral, Daily    empagliflozin (JARDIANCE) 10 mg, oral, Daily    levothyroxine (Synthroid, Levoxyl) 25 mcg tablet TAKE 1 TABLET BY MOUTH EVERY DAY AS DIRECTED    loratadine (CLARITIN) 10 mg, oral, Daily    meloxicam (MOBIC) 15 mg, oral, Daily PRN    metFORMIN (GLUCOPHAGE) 500 mg, oral, Nightly    Mounjaro 2.5 mg, subcutaneous, Weekly    Mounjaro 5 mg, subcutaneous, Weekly    spironolactone (ALDACTONE) 25 mg, oral, Daily    valACYclovir (Valtrex) 500 mg tablet 1 tablet, oral, 2 times daily      Vitals  BP Readings from Last 2 Encounters:   08/05/24 122/78   07/10/24 108/66     BMI Readings from Last 1 Encounters:   08/05/24 39.22 kg/m²      Labs  A1C  Lab Results   Component Value Date    HGBA1C 5.9 (H) 07/10/2024    HGBA1C 5.7 (H) 03/20/2024    HGBA1C 5.7 (A) 09/29/2023     BMP  Lab Results   Component Value Date    CALCIUM 10.0 07/10/2024     07/10/2024    K 4.8 07/10/2024    CO2 28 07/10/2024     07/10/2024    BUN 17 07/10/2024    CREATININE 0.70 07/10/2024    EGFR >90 07/10/2024     LFTs  Lab Results   Component Value Date    ALT 23 07/10/2024    AST 20 07/10/2024    ALKPHOS 121 07/10/2024    BILITOT 0.6 07/10/2024     FLP  Lab Results   Component Value Date    TRIG 228 (H) 03/20/2024    CHOL 158 03/20/2024    LDLF 60 07/06/2023    LDLCALC 59 03/20/2024    HDL 53.3 03/20/2024     Urine Microalbumin  Lab Results   Component Value Date    MICROALBCREA SEE COMMENT 09/29/2023     Weight Management  Wt Readings from Last 3 Encounters:   08/05/24 110 kg (243 lb)   07/10/24 108 kg (237 lb)   01/02/24 106 kg (232 lb 14.4 oz)      There is no height or weight on file to calculate BMI.     Assessment/Plan   Problem List Items Addressed This Visit       Type 2 diabetes mellitus without complication,  without long-term current use of insulin (Multi)     Patient's goal A1c is < 7%.  Is pt at goal? Yes, 5.9% on 7.10.24  Patient's SMBGs are not available today, but likely at goal.     Rationale for plan: Will plan to continue current therapy today.    Medication Changes:  CONTINUE:  Metformin 500 mg daily  Jardiance 10 mg daily  Mounjaro 5 mg subcutaneously once weekly    Monitoring and Education:  Advised Mounjaro appeal will not be approved based on patient's current A1c/pre-diabetic history  Patient is to follow-up with PCP next month with new labs  Will send 3 month supply of Mounjaro today to get patient to about April 2025, then will likely need to discontinue GLP1a therapy  Mounjaro Education:   Counseled patient on Mounjaro MOA, expectations, side effects, duration of therapy, administration, and monitoring parameters.  Provided detailed dosing and administration counseling to ensure proper technique.   Reviewed Mounjaro titration schedule, starting with 2.5 mg once weekly to a goal of 15 mg once weekly if tolerated  Counseled patient on the benefits of GLP-1ra glycemic control and weight loss  Reviewed storage requirements of Mounjaro when not in use  Advised patient that they may experience improved satiety after meals and portion sizes of meals may be reduced as doses of Mounjaro increase.  Answered all patient questions and concerns          Clinical Pharmacist follow-up: 1.21.25 @ 11 am, Telehealth visit    Continue all meds under the continuation of care with the referring provider and clinical pharmacy team.    Thank you,  Linda Khoury, PharmD  Clinical Pharmacist  [phone]    Verbal consent to manage patient's drug therapy was obtained from the patient. They were informed they may decline to participate or withdraw from participation in pharmacy services at any time.

## 2024-12-20 NOTE — ASSESSMENT & PLAN NOTE
Patient's goal A1c is < 7%.  Is pt at goal? Yes, 5.9% on 7.10.24  Patient's SMBGs are not available today, but likely at goal.     Rationale for plan: Will plan to continue current therapy today.    Medication Changes:  CONTINUE:  Metformin 500 mg daily  Jardiance 10 mg daily  Mounjaro 5 mg subcutaneously once weekly    Monitoring and Education:  Advised Mounjaro appeal will not be approved based on patient's current A1c/pre-diabetic history  Patient is to follow-up with PCP next month with new labs  Will send 3 month supply of Mounjaro today to get patient to about April 2025, then will likely need to discontinue GLP1a therapy  Mounjaro Education:   Counseled patient on Mounjaro MOA, expectations, side effects, duration of therapy, administration, and monitoring parameters.  Provided detailed dosing and administration counseling to ensure proper technique.   Reviewed Mounjaro titration schedule, starting with 2.5 mg once weekly to a goal of 15 mg once weekly if tolerated  Counseled patient on the benefits of GLP-1ra glycemic control and weight loss  Reviewed storage requirements of Mounjaro when not in use  Advised patient that they may experience improved satiety after meals and portion sizes of meals may be reduced as doses of Mounjaro increase.  Answered all patient questions and concerns

## 2025-01-03 RX ORDER — AMOXICILLIN 500 MG/1
500 CAPSULE ORAL
COMMUNITY
Start: 2024-10-23

## 2025-01-03 RX ORDER — AMOXICILLIN AND CLAVULANATE POTASSIUM 875; 125 MG/1; MG/1
1 TABLET, FILM COATED ORAL 2 TIMES DAILY
COMMUNITY
Start: 2024-10-25 | End: 2025-01-07 | Stop reason: WASHOUT

## 2025-01-07 ENCOUNTER — APPOINTMENT (OUTPATIENT)
Dept: NEUROLOGY | Facility: CLINIC | Age: 65
End: 2025-01-07
Payer: COMMERCIAL

## 2025-01-07 VITALS
HEART RATE: 89 BPM | DIASTOLIC BLOOD PRESSURE: 86 MMHG | WEIGHT: 221 LBS | HEIGHT: 67 IN | SYSTOLIC BLOOD PRESSURE: 129 MMHG | BODY MASS INDEX: 34.69 KG/M2

## 2025-01-07 DIAGNOSIS — R25.1 TREMOR: Primary | ICD-10-CM

## 2025-01-07 PROCEDURE — 99214 OFFICE O/P EST MOD 30 MIN: CPT | Performed by: PSYCHIATRY & NEUROLOGY

## 2025-01-07 PROCEDURE — 1036F TOBACCO NON-USER: CPT | Performed by: PSYCHIATRY & NEUROLOGY

## 2025-01-07 PROCEDURE — 3008F BODY MASS INDEX DOCD: CPT | Performed by: PSYCHIATRY & NEUROLOGY

## 2025-01-07 PROCEDURE — 3074F SYST BP LT 130 MM HG: CPT | Performed by: PSYCHIATRY & NEUROLOGY

## 2025-01-07 PROCEDURE — 3079F DIAST BP 80-89 MM HG: CPT | Performed by: PSYCHIATRY & NEUROLOGY

## 2025-01-07 ASSESSMENT — UNIFIED PARKINSONS DISEASE RATING SCALE (UPDRS)
PRONATION_SUPINATION_RIGHT: 0
AMPLITUDE_LIP_JAW: 0
SPEECH: 1
RIGIDITY_RLE: 0
FREEZING_GAIT: 0
GAIT: 0
CONSTANCY_TREMOR_ATREST: 1
AMPLITUDE_RUE: 1
LEG_AGILITY_LEFT: 1
AMPLITUDE_LUE: 0
KINETIC_TREMOR_RIGHTHAND: 0
POSTURAL_STABILITY: 0
CHAIR_RISING_SCALE: 1
RIGIDITY_LLE: 0
POSTURAL_TREMOR_LEFTHAND: 0
POSTURAL_TREMOR_RIGHTHAND: 0
RIGIDITY_NECK: 0
DYSKINESIAS_PRESENT: NO
PRONATION_SUPINATION_LEFT: 0
FINGER_TAPPING_LEFT: 0
HANDMOVEMENTS_RIGHT: 0
KINETIC_TREMOR_LEFTHAND: 0
TOETAPPING_LEFT: 0
POSTURE: 0
LEG_AGILITY_RIGHT: 0
FACIAL_EXPRESSION: 0
RIGIDITY_RUE: 0
LEVODOPA: YES
TOETAPPING_RIGHT: 0
AMPLITUDE_RLE: 0
RIGIDITY_LUE: 0
FINGER_TAPPING_RIGHT: 0
TOTAL_SCORE: 6
SPONTANEITY_OF_MOVEMENT: 1
AMPLITUDE_LLE: 0

## 2025-01-07 ASSESSMENT — PATIENT HEALTH QUESTIONNAIRE - PHQ9
1. LITTLE INTEREST OR PLEASURE IN DOING THINGS: NOT AT ALL
2. FEELING DOWN, DEPRESSED OR HOPELESS: NOT AT ALL
1. LITTLE INTEREST OR PLEASURE IN DOING THINGS: NOT AT ALL
SUM OF ALL RESPONSES TO PHQ9 QUESTIONS 1 & 2: 0
SUM OF ALL RESPONSES TO PHQ9 QUESTIONS 1 AND 2: 0
2. FEELING DOWN, DEPRESSED OR HOPELESS: NOT AT ALL

## 2025-01-07 ASSESSMENT — ENCOUNTER SYMPTOMS
LOSS OF SENSATION IN FEET: 0
OCCASIONAL FEELINGS OF UNSTEADINESS: 0
DEPRESSION: 0

## 2025-01-07 NOTE — PATIENT INSTRUCTIONS
It was nice to see you today.     Continue to exercise   Healthy diet - we recommend the Mediterranean diet.   RTC in 1 year or sooner if something changes

## 2025-01-07 NOTE — PROGRESS NOTES
Subjective   Emelina Powell is a right handed  64 y.o. year old female who presents with tremor follow-up.   Visit type: follow up.   Last time seen one year ago.     Prior hx:  Mrs Powell first noted symptoms in R hand on the consisting of resting tremor, initially when holding the steering wheel which started 8 years ago. Gradually progress to interrupt other activities like holding a book, working out etc.     Interval hx:  Since last visit, patient reported that the tremor occurs more when she is stressed for example at the airport. She reported that the tremor is most noticeable while holding items, at the steering wheel, if hand is resting on gear shift. Has not increased since last seen a year back.   Handwriting can be an issue w control - it is more sloppy. It is not smaller.   Patient notices the tremors 2-3 times a week (last year said 3-4 times a week).  Has been diagnosed w SATNAM, working on getting a mask for CPAP to fit.     Review of Motor symptoms:  Tremor:  Location- RUE                 Rest/postural/action- postural  Stiffness/rigidity: denied (does have some hip stiffness)  Slowness: denied  Trouble walking: denied, walks cautiously due to pain.   Freezing of gait: denied  Balance problems: pain affecting balance  Falls: denied  Changes in speech: denied  Swallowing difficulties: denied  Abnormal postures: denied  Handwriting: occasionally sloppy.   Activities of daily living (buttoning clothes, bathing, cutting food, etc):  unimpaired    Review of Non-Motor symptoms:  Cognition:  Memory- denies         Hallucinations- denies         Mood:        Depression-  denied                       Anxiety: denied  Sleep disturbances including REM behavior disorder: SATNAM, no RBD. Does toss and turn.   Sensory changes (ie, smell or taste): denied  Cramps/pain: denied  Gastrointestinal complaints/Constipation: yes - due to Mounjaro.   Urinary retention or frequency: denied  Positional lightheadedness and/or  syncope: denied  Excessive saliva/drooling: denied  Fatigue: denied    Medication History:  No medications for tremor        Family history  PD (mother)    Social history  Remote smoking history. Denied alcohol use. Denied illicit substance use.    Patient Active Problem List   Diagnosis    Borderline hypothyroidism    Essential hypertension    Essential tremor    HSV-1 (herpes simplex virus 1) infection    HSV-2 (herpes simplex virus 2) infection    Mixed hyperlipidemia    Primary osteoarthritis of both knees    Rosacea    Type 2 diabetes mellitus without complication, without long-term current use of insulin (Multi)    Personal history of malignant neoplasm of breast    History of syphilis    Ductal carcinoma in situ (DCIS) of right breast    Malignant neoplasm of right female breast, unspecified estrogen receptor status, unspecified site of breast      Past Medical History:   Diagnosis Date    Basal cell carcinoma 04/14/2023    H/O diagnostic mammography 06/26/2024    cat 2    History of abnormal mammogram 04/2023    right cat 4-had bx-DCIS    History of mammogram 03/2022    cat 1    Pap test, as part of routine gynecological examination 08/2023    wnl, hpv neg    Syphilis     Tick bite       Past Surgical History:   Procedure Laterality Date    BI STEREOTACTIC GUIDED BREAST RIGHT LOCALIZATION AND BIOPSY Right 4/13/2023    BI STEREOTACTIC GUIDED BREAST RIGHT LOCALIZATION AND BIOPSY 4/13/2023    BREAST BIOPSY  06/22/2023    COLONOSCOPY  10/2020    no polyps    ESOPHAGOGASTRODUODENOSCOPY  10/2020    OTHER SURGICAL HISTORY  2018    Mohs surgery x 3    TOTAL KNEE ARTHROPLASTY Right 06/2019    WISDOM TOOTH EXTRACTION  1979      Social History     Socioeconomic History    Marital status:      Spouse name: Not on file    Number of children: Not on file    Years of education: Not on file    Highest education level: Not on file   Occupational History    Not on file   Tobacco Use    Smoking status: Former     Types:  Cigarettes    Smokeless tobacco: Never    Tobacco comments:     [id number]   Substance and Sexual Activity    Alcohol use: Never    Drug use: Never    Sexual activity: Not on file   Other Topics Concern    Not on file   Social History Narrative    Not on file     Social Drivers of Health     Financial Resource Strain: Low Risk  (8/8/2023)    Received from [facility]    Overall Financial Resource Strain (CARDIA)     Difficulty of Paying Living Expenses: Not hard at all   Food Insecurity: No Food Insecurity (8/8/2023)    Received from [facility]    Hunger Vital Sign     Worried About Running Out of Food in the Last Year: Never true     Ran Out of Food in the Last Year: Never true   Transportation Needs: Unknown (8/8/2023)    Received from [facility]    PRAPARE - Transportation     Lack of Transportation (Medical): Not on file     Lack of Transportation (Non-Medical): No   Physical Activity: Not on file   Stress: Not on file   Social Connections: Not on file   Intimate Partner Violence: Not on file   Housing Stability: Unknown (8/8/2023)    Received from [facility]    Housing Stability Vital Sign     Unable to Pay for Housing in the Last Year: Not on file     Number of Places Lived in the Last Year: Not on file     Unstable Housing in the Last Year: No      Objective   Vitals:    01/07/25 1211   BP: 129/86   Pulse: 89             Archimedes spiral and handwriting were both within normal limits. (Scanned into chart)    MDS UPDRS 1st Score: Motor Examination  Is the patient on Levodopa?: Yes  Speech: 1  Facial Expression: 0  Rigidty Neck: 0  Rigidty RUE: 0  Rigidity - LUE: 0  Rigidity RLE: 0  Rigidity LLE: 0  Finger Tapping Right Hand: 0  Finger Tapping Left Hand: 0  Hand Movements- Right Hand:  0  Hand Movements- Left Hand: 0  Pronatiaon-Supination Movments - Right Hand: 0  Pronatiaon-Supination Movments Left Hand: 0  Toe Tapping Right Foot: 0  Toe Tapping - Left Foot: 0  Leg Agility - Right Le  Leg Agility - Left le  Arising from Chair: 1  Gait: 0  Freezing of Gait: 0  Postural Stability: 0  Posture: 0  Global Spontanteity of Movment ( Body Bradykinesia): 1 (re-emergence of R hand rest tremor w gait)  Postural Tremor - Right Hand: 0  Postural Tremor - Left hand: 0  Kinetic Tremor - Right hand: 0  Kinetic Tremor - Left hand: 0  Rest Tremor Amplitude - RUE: 1  Rest Tremor Amplitude - LUE: 0  Rest Tremor Amplitude - RLE: 0  Rest Tremor Amplitude - LLE: 0  Rest Tremor Amplitude - Lip/Jaw: 0  Constancy of Rest Tremor: 1  MDS UPDRS Total Score: 6  Were dyskinesias (chorea or dystonia) present during examination?: No    Hemoglobin A1C   Date Value Ref Range Status   07/10/2024 5.9 (H) see below % Final     Estimated Average Glucose   Date Value Ref Range Status   07/10/2024 123 Not Established mg/dL Final   2022 123 mg/dL Final     Comment:     eAG: (Estimated average glucose) is a calculated value from HgbA1c and is representative of the average blood glucose level in the last 2-3 month period.     Thyroid Stimulating Hormone   Date Value Ref Range Status   2024 1.90 0.44 - 3.98 mIU/L Final     Assessment/Plan   Emelina Powell is a right handed 64 y.o. year old female who presents with tremor follow-up. RUE tremor appears unchanged from prior without interval development of other parkinsonian features. Given duration of symptoms (>6 years), lack of progression atypical for PD--however, we will continue to monitor. We did discuss obtaining a Ania scan but given that management would be unchanged, we both agreed to hold off.     - counseled on regular exercise and benefit of healthy diet,   - RTC 12 months for repeat exam  - can consider Ania scan at that time if remains unclear.

## 2025-01-07 NOTE — LETTER
January 7, 2025     Destiny Almendarez MD  [address]  [place] [address]    Patient: Emelina Powell   YOB: 1960   Date of Visit: 1/7/2025       Dear Dr. Destiny Almendarez MD:    Thank you for referring Emelina Powell to me for evaluation. Below are my notes for this consultation.  If you have questions, please do not hesitate to call me. I look forward to following your patient along with you.       Sincerely,     Yvonne Bai MD      CC: No Recipients  ______________________________________________________________________________________    Subjective  Emelina Powell is a right handed  64 y.o. year old female who presents with tremor follow-up.   Visit type: follow up.   Last time seen one year ago.     Prior hx:  Mrs Powell first noted symptoms in R hand on the consisting of resting tremor, initially when holding the steering wheel which started 8 years ago. Gradually progress to interrupt other activities like holding a book, working out etc.     Interval hx:  Since last visit, patient reported that the tremor occurs more when she is stressed for example at the airport. She reported that the tremor is most noticeable while holding items, at the steering wheel, if hand is resting on gear shift. Has not increased since last seen a year back.   Handwriting can be an issue w control - it is more sloppy. It is not smaller.   Patient notices the tremors 2-3 times a week (last year said 3-4 times a week).  Has been diagnosed w SATNAM, working on getting a mask for CPAP to fit.     Review of Motor symptoms:  Tremor:  Location- RUE                 Rest/postural/action- postural  Stiffness/rigidity: denied (does have some hip stiffness)  Slowness: denied  Trouble walking: denied, walks cautiously due to pain.   Freezing of gait: denied  Balance problems: pain affecting balance  Falls: denied  Changes in speech: denied  Swallowing difficulties: denied  Abnormal postures:  denied  Handwriting: occasionally sloppy.   Activities of daily living (buttoning clothes, bathing, cutting food, etc):  unimpaired    Review of Non-Motor symptoms:  Cognition:  Memory- denies         Hallucinations- denies         Mood:        Depression-  denied                       Anxiety: denied  Sleep disturbances including REM behavior disorder: SATNAM, no RBD. Does toss and turn.   Sensory changes (ie, smell or taste): denied  Cramps/pain: denied  Gastrointestinal complaints/Constipation: yes - due to Mounjaro.   Urinary retention or frequency: denied  Positional lightheadedness and/or syncope: denied  Excessive saliva/drooling: denied  Fatigue: denied    Medication History:  No medications for tremor        Family history  PD (mother)    Social history  Remote smoking history. Denied alcohol use. Denied illicit substance use.    Patient Active Problem List   Diagnosis   • Borderline hypothyroidism   • Essential hypertension   • Essential tremor   • HSV-1 (herpes simplex virus 1) infection   • HSV-2 (herpes simplex virus 2) infection   • Mixed hyperlipidemia   • Primary osteoarthritis of both knees   • Rosacea   • Type 2 diabetes mellitus without complication, without long-term current use of insulin (Multi)   • Personal history of malignant neoplasm of breast   • History of syphilis   • Ductal carcinoma in situ (DCIS) of right breast   • Malignant neoplasm of right female breast, unspecified estrogen receptor status, unspecified site of breast      Past Medical History:   Diagnosis Date   • Basal cell carcinoma 04/14/2023   • H/O diagnostic mammography 06/26/2024    cat 2   • History of abnormal mammogram 04/2023    right cat 4-had bx-DCIS   • History of mammogram 03/2022    cat 1   • Pap test, as part of routine gynecological examination 08/2023    wnl, hpv neg   • Syphilis    • Tick bite       Past Surgical History:   Procedure Laterality Date   • BI STEREOTACTIC GUIDED BREAST RIGHT LOCALIZATION AND BIOPSY  Right 4/13/2023    BI STEREOTACTIC GUIDED BREAST RIGHT LOCALIZATION AND BIOPSY 4/13/2023   • BREAST BIOPSY  06/22/2023   • COLONOSCOPY  10/2020    no polyps   • ESOPHAGOGASTRODUODENOSCOPY  10/2020   • OTHER SURGICAL HISTORY  2018    Mohs surgery x 3   • TOTAL KNEE ARTHROPLASTY Right 06/2019   • WISDOM TOOTH EXTRACTION  1979      Social History     Socioeconomic History   • Marital status:      Spouse name: Not on file   • Number of children: Not on file   • Years of education: Not on file   • Highest education level: Not on file   Occupational History   • Not on file   Tobacco Use   • Smoking status: Former     Types: Cigarettes   • Smokeless tobacco: Never   • Tobacco comments:     [id number]   Substance and Sexual Activity   • Alcohol use: Never   • Drug use: Never   • Sexual activity: Not on file   Other Topics Concern   • Not on file   Social History Narrative   • Not on file     Social Drivers of Health     Financial Resource Strain: Low Risk  (8/8/2023)    Received [facility]    Overall Financial Resource Strain (CARDIA)    • Difficulty of Paying Living Expenses: Not hard at all   Food Insecurity: No Food Insecurity (8/8/2023)    Received from [facility]    Hunger Vital Sign    • Worried About Running Out of Food in the Last Year: Never true    • Ran Out of Food in the Last Year: Never true   Transportation Needs: Unknown (8/8/2023)    Received from [facility]    PRAPARE - Transportation    • Lack of Transportation (Medical): Not on file    • Lack of Transportation (Non-Medical): No   Physical Activity: Not on file   Stress: Not on file   Social Connections: Not on file   Intimate Partner Violence: Not on file   Housing Stability: Unknown (8/8/2023)    Received from [facility]    Housing Stability Vital Sign    • Unable to Pay for Housing in the Last Year: Not on file    • Number of Places Lived in the Last Year: Not on file    • Unstable Housing in the Last Year: No      Objective  Vitals:    25 1211   BP: 129/86   Pulse: 89             Archimedes spiral and handwriting were both within normal limits. (Scanned into chart)    MDS UPDRS 1st Score: Motor Examination  Is the patient on Levodopa?: Yes  Speech: 1  Facial Expression: 0  Rigidty Neck: 0  Rigidty RUE: 0  Rigidity - LUE: 0  Rigidity RLE: 0  Rigidity LLE: 0  Finger Tapping Right Hand: 0  Finger Tapping Left Hand: 0  Hand Movements- Right Hand: 0  Hand Movements- Left Hand: 0  Pronatiaon-Supination Movments - Right Hand: 0  Pronatiaon-Supination Movments Left Hand: 0  Toe Tapping Right Foot: 0  Toe Tapping - Left Foot: 0  Leg Agility - Right Le  Leg Agility - Left le  Arising from Chair: 1  Gait: 0  Freezing of Gait: 0  Postural Stability: 0  Posture: 0  Global Spontanteity of Movment ( Body Bradykinesia): 1 (re-emergence of R hand rest tremor w gait)  Postural Tremor - Right Hand: 0  Postural Tremor - Left hand: 0  Kinetic Tremor - Right hand: 0  Kinetic Tremor - Left hand: 0  Rest Tremor Amplitude - RUE: 1  Rest Tremor Amplitude - LUE: 0  Rest Tremor Amplitude - RLE: 0  Rest Tremor Amplitude - LLE: 0  Rest Tremor Amplitude - Lip/Jaw: 0  Constancy of Rest Tremor: 1  MDS UPDRS Total Score: 6  Were dyskinesias (chorea or dystonia) present during examination?: No    Hemoglobin A1C   Date Value Ref Range Status   07/10/2024 5.9 (H) see below % Final     Estimated Average Glucose   Date Value Ref Range Status   07/10/2024 123 Not Established mg/dL Final   2022 123 mg/dL Final     Comment:     eAG: (Estimated average glucose) is a calculated value from HgbA1c and is representative of the average blood glucose level in the last 2-3 month period.     Thyroid Stimulating Hormone   Date Value Ref Range Status    03/20/2024 1.90 0.44 - 3.98 mIU/L Final     Assessment/Plan  Emelina Powell is a right handed 64 y.o. year old female who presents with tremor follow-up. RUE tremor appears unchanged from prior without interval development of other parkinsonian features. Given duration of symptoms (>6 years), lack of progression atypical for PD--however, we will continue to monitor. We did discuss obtaining a Ania scan but given that management would be unchanged, we both agreed to hold off.     - counseled on regular exercise and benefit of healthy diet,   - RTC 12 months for repeat exam  - can consider Ania scan at that time if remains unclear.

## 2025-01-14 ENCOUNTER — APPOINTMENT (OUTPATIENT)
Dept: PRIMARY CARE | Facility: CLINIC | Age: 65
End: 2025-01-14
Payer: COMMERCIAL

## 2025-01-14 VITALS
BODY MASS INDEX: 35 KG/M2 | RESPIRATION RATE: 16 BRPM | DIASTOLIC BLOOD PRESSURE: 78 MMHG | HEART RATE: 97 BPM | SYSTOLIC BLOOD PRESSURE: 104 MMHG | OXYGEN SATURATION: 99 % | WEIGHT: 223 LBS | HEIGHT: 67 IN | TEMPERATURE: 97.1 F

## 2025-01-14 DIAGNOSIS — F32.A ANXIETY AND DEPRESSION: ICD-10-CM

## 2025-01-14 DIAGNOSIS — C50.911 MALIGNANT NEOPLASM OF RIGHT FEMALE BREAST, UNSPECIFIED ESTROGEN RECEPTOR STATUS, UNSPECIFIED SITE OF BREAST: ICD-10-CM

## 2025-01-14 DIAGNOSIS — I10 ESSENTIAL HYPERTENSION: ICD-10-CM

## 2025-01-14 DIAGNOSIS — E03.9 BORDERLINE HYPOTHYROIDISM: Primary | ICD-10-CM

## 2025-01-14 DIAGNOSIS — F41.9 ANXIETY AND DEPRESSION: ICD-10-CM

## 2025-01-14 DIAGNOSIS — E11.9 TYPE 2 DIABETES MELLITUS WITHOUT COMPLICATION, WITHOUT LONG-TERM CURRENT USE OF INSULIN (MULTI): ICD-10-CM

## 2025-01-14 PROCEDURE — 1036F TOBACCO NON-USER: CPT | Performed by: FAMILY MEDICINE

## 2025-01-14 PROCEDURE — 3074F SYST BP LT 130 MM HG: CPT | Performed by: FAMILY MEDICINE

## 2025-01-14 PROCEDURE — 3078F DIAST BP <80 MM HG: CPT | Performed by: FAMILY MEDICINE

## 2025-01-14 PROCEDURE — 3008F BODY MASS INDEX DOCD: CPT | Performed by: FAMILY MEDICINE

## 2025-01-14 PROCEDURE — 99214 OFFICE O/P EST MOD 30 MIN: CPT | Performed by: FAMILY MEDICINE

## 2025-01-14 NOTE — PROGRESS NOTES
Subjective   Patient ID: Emelina Powell is a 64 y.o. female who presents for 6 month follow up.  Covid vax: UTD  Flu: UTD  RSV: UTD  Shingles: UTD     CRC: 2020 utd  Mammogram: 6/2024  Pap: 8/2023  Lmp: n/a  Hba1c 5.9  Pt HAS new dx sleep apnea SEVERE  And recommended and starting cpap  Uses-and benefits    HPI  Patient Active Problem List   Diagnosis    Borderline hypothyroidism    Essential hypertension    Essential tremor    HSV-1 (herpes simplex virus 1) infection    HSV-2 (herpes simplex virus 2) infection    Mixed hyperlipidemia    Primary osteoarthritis of both knees    Rosacea    Type 2 diabetes mellitus without complication, without long-term current use of insulin (Multi)    Personal history of malignant neoplasm of breast    History of syphilis    Ductal carcinoma in situ (DCIS) of right breast    Malignant neoplasm of right female breast, unspecified estrogen receptor status, unspecified site of breast     Sees dr ibarra    Past Surgical History:   Procedure Laterality Date    BI STEREOTACTIC GUIDED BREAST RIGHT LOCALIZATION AND BIOPSY Right 4/13/2023    BI STEREOTACTIC GUIDED BREAST RIGHT LOCALIZATION AND BIOPSY 4/13/2023    BREAST BIOPSY  06/22/2023    COLONOSCOPY  10/2020    no polyps    ESOPHAGOGASTRODUODENOSCOPY  10/2020    OTHER SURGICAL HISTORY  2018    Mohs surgery x 3    TOTAL KNEE ARTHROPLASTY Right 06/2019    WISDOM TOOTH EXTRACTION  1979       Review of Systems  This patient has  NO history of seizures/ CAD or CVA    NO history of recent Covid nor flu symptoms,  NO Fever nor chills,  NO Chest pain, shortness of breath nor paroxysmal nocturnal dyspnea,  NO Nausea, vomiting, nor diarrhea,  NO Hematochezia nor melena,  NO Dysuria, hematuria, nor new incontinence issues  NO new severe headaches nor neurological complaints,  NO new issues with anxiety nor depression nor new psychiatric complaints,  NO suicidal nor homicidal ideations.     OBJECTIVE:  /78   Pulse 97   Temp 36.2 °C (97.1  "°F) (Temporal)   Resp 16   Ht 1.689 m (5' 6.5\")   Wt 101 kg (223 lb)   LMP  (LMP Unknown)   SpO2 99%   BMI 35.45 kg/m²      General:  alert, oriented, no acute distress.  No obvious skin rashes noted.   No gait disturbance noted.    Mood is pleasant,  no signs of emotional distress.   Not appearing intoxicated or altered.   No voiced delusions,   Normal, appropriate behavior.    HEENT: Normocephalic, atraumatic,   Pupils round, reactive to light  Extraocular motions intact and wnl  Tympanic membranes normal    Neck: no nuchal rigidity  No masses palpable.  No carotid bruits.  No thyromegaly.    Respiratory: Equal breath sounds  No wheezes,    rales,    nor rhonchi  No respiratory distress.    Heart: Regular rate and rhythm, no    murmurs  no rubs/gallops    Abdomen: no masses palpable, nontender, no rebound nor guarding.    Extremities: NO cyanosis noted, no clubbing.   No edema noted.  2+dorsalis pedis pulses.    Normal-not antalgic, steady gait.         Assessment/Plan     Problem List Items Addressed This Visit       Borderline hypothyroidism - Primary    Relevant Orders    Comprehensive Metabolic Panel    CBC and Auto Differential    Hemoglobin A1C    Lipid Panel    Thyroid Stimulating Hormone    Thyroxine, Free    Essential hypertension    Relevant Orders    Comprehensive Metabolic Panel    CBC and Auto Differential    Hemoglobin A1C    Lipid Panel    Thyroid Stimulating Hormone    Thyroxine, Free    Type 2 diabetes mellitus without complication, without long-term current use of insulin (Multi)    Relevant Orders    Comprehensive Metabolic Panel    CBC and Auto Differential    Hemoglobin A1C    Lipid Panel    Thyroid Stimulating Hormone    Thyroxine, Free    Glucose, random    Malignant neoplasm of right female breast, unspecified estrogen receptor status, unspecified site of breast    Relevant Orders    Comprehensive Metabolic Panel    CBC and Auto Differential    Hemoglobin A1C    Lipid Panel    Thyroid " Stimulating Hormone    Thyroxine, Free     Other Visit Diagnoses       Anxiety and depression        Relevant Orders    Follow Up In [facility]          Has multiple sxs dm2- polyuria polydipsea in the past and xerosis  Scoured chart cannot find qualifying dx but sometime in pandemic happened likely crossover  But has been on glucose lowering meds x years  To be proactive  Would NOT be ideal to stop just to get a diagnosis    Follow up at next scheduled visit -as planned    And again had a lengthy discussion w pt  about risks of poorly controlled diabetes including micro and macrovascular complications of DM2 including blindness,MI,CVA and death among other possibilities. Pt aware and agrees to better -or if good control-continued compliance and adherance to instructions such as regular eye exams q 1-2 y, foot exams,and f/u regularly for hba1c with a goal of 6.5.    Follow up as planned for hba1c and BP checks REGULARLY.  I have discussed the collaborative care model for this patient’s behavioral health care. Written detailed information and identifying the members of this care team was provided to patient. They give permission for the Behavioral Health Manager (BHM) and psychiatric consultant to be included in their care with my continued primary management. Patient made aware that services provided as part of the Collaborative Care Model are subject to cost sharing.  Reqs therapy some anxiety occ depression no hi/si  Agrees to er if in crisis

## 2025-01-15 PROBLEM — G47.33 OBSTRUCTIVE SLEEP APNEA SYNDROME: Status: ACTIVE | Noted: 2025-01-15

## 2025-01-16 DIAGNOSIS — E03.9 HYPOTHYROIDISM, UNSPECIFIED: ICD-10-CM

## 2025-01-16 RX ORDER — LEVOTHYROXINE SODIUM 25 UG/1
TABLET ORAL
Qty: 90 TABLET | Refills: 1 | Status: SHIPPED | OUTPATIENT
Start: 2025-01-16

## 2025-01-17 DIAGNOSIS — R73.03 PREDIABETES: ICD-10-CM

## 2025-01-17 RX ORDER — EMPAGLIFLOZIN 10 MG/1
10 TABLET, FILM COATED ORAL DAILY
Qty: 90 TABLET | Refills: 1 | Status: SHIPPED | OUTPATIENT
Start: 2025-01-17

## 2025-01-21 ENCOUNTER — TELEPHONE (OUTPATIENT)
Dept: PRIMARY CARE | Facility: CLINIC | Age: 65
End: 2025-01-21

## 2025-01-21 ENCOUNTER — APPOINTMENT (OUTPATIENT)
Dept: PHARMACY | Facility: HOSPITAL | Age: 65
End: 2025-01-21
Payer: COMMERCIAL

## 2025-01-21 ENCOUNTER — OFFICE VISIT (OUTPATIENT)
Dept: SURGERY | Age: 65
End: 2025-01-21
Payer: COMMERCIAL

## 2025-01-21 VITALS
HEART RATE: 78 BPM | RESPIRATION RATE: 12 BRPM | DIASTOLIC BLOOD PRESSURE: 70 MMHG | SYSTOLIC BLOOD PRESSURE: 113 MMHG | HEIGHT: 66 IN | WEIGHT: 223 LBS | BODY MASS INDEX: 35.84 KG/M2 | OXYGEN SATURATION: 98 % | TEMPERATURE: 97.5 F

## 2025-01-21 DIAGNOSIS — D05.11 DUCTAL CARCINOMA IN SITU (DCIS) OF RIGHT BREAST: Primary | ICD-10-CM

## 2025-01-21 DIAGNOSIS — Z12.31 ENCOUNTER FOR SCREENING MAMMOGRAM FOR BREAST CANCER: ICD-10-CM

## 2025-01-21 DIAGNOSIS — E66.812 OBESITY, CLASS II, BMI 35-39.9: ICD-10-CM

## 2025-01-21 PROCEDURE — 99214 OFFICE O/P EST MOD 30 MIN: CPT | Performed by: SURGERY

## 2025-01-21 PROCEDURE — 3074F SYST BP LT 130 MM HG: CPT | Performed by: SURGERY

## 2025-01-21 PROCEDURE — 3078F DIAST BP <80 MM HG: CPT | Performed by: SURGERY

## 2025-01-21 RX ORDER — AMOXICILLIN 500 MG/1
1000 CAPSULE ORAL PRN
COMMUNITY
Start: 2024-10-23

## 2025-01-21 RX ORDER — TIRZEPATIDE 5 MG/.5ML
5 INJECTION, SOLUTION SUBCUTANEOUS WEEKLY
COMMUNITY

## 2025-01-21 ASSESSMENT — ENCOUNTER SYMPTOMS
NAUSEA: 0
ABDOMINAL PAIN: 0
COUGH: 0
VOMITING: 0
SORE THROAT: 0
COLOR CHANGE: 0
SHORTNESS OF BREATH: 0
CHEST TIGHTNESS: 0

## 2025-01-21 NOTE — PROGRESS NOTES
Writer outreached pt regarding their referral to Collaborative Care. Explained program and answered pt's questions. Pt requested to schedule future initial assessment. Pt is scheduled for 3/4 @ 10am in person.

## 2025-01-21 NOTE — PROGRESS NOTES
Reason for today's visit:  6 month follow up right breast DCIS    Palpates a breast change? Does not do regular self breast exams  Nipple discharge: no  Breast Pain: no  Breast Mass: no  Swelling in either upper extremity? no    Wellness:    Self breast self exams: yes - occasional  Regular exercise routine: yes   Following Lymphedema awareness/precautions: no LN removal  Managing stress:yes  Stress level on a scale of 1 - 10: 1-2    Instruction:    Follow up per provider  Imaging per provider  Monthly self breast exams  Healthy diet  Exercise program  Lymphedema precautions/awareness    Other:    Requisitions needed:no  Bras/prosthesis: no  Compression Sleeve/glove: no  Lymphedema Measurements: not completed no LN removal  Therapy: no  PT/OT/Lymphedema: no  Follow up as advised per Dr Barillas        
orly lumpectomy).      Right: No inverted nipple, mass, nipple discharge, skin change or tenderness.      Left: No inverted nipple, mass, nipple discharge, skin change or tenderness.   Musculoskeletal:         General: Normal range of motion.      Cervical back: Normal range of motion and neck supple.      Comments: Normal Range of motion in upper and lower extremities.   Lymphadenopathy:      Cervical: No cervical adenopathy.      Right cervical: No superficial, deep or posterior cervical adenopathy.     Left cervical: No superficial, deep or posterior cervical adenopathy.      Upper Body:      Right upper body: No supraclavicular, axillary or pectoral adenopathy.      Left upper body: No supraclavicular, axillary or pectoral adenopathy.   Skin:     General: Skin is warm and dry.      Findings: No abrasion, bruising, erythema or lesion.   Neurological:      Mental Status: She is alert and oriented to person, place, and time. She is not disoriented.   Psychiatric:         Speech: Speech normal.         Behavior: Behavior normal. Behavior is cooperative.         Thought Content: Thought content normal.         Judgment: Judgment normal.             IMAGING:     See epic     Assessment:       ICD-10-CM    1. Ductal carcinoma in situ (DCIS) of right breast  D05.11       2. Obesity, Class II, BMI 35-39.9  E66.812       3. Encounter for screening mammogram for breast cancer  Z12.31 [facility] TEE DIGITAL SCREEN BILATERAL            Plan:     Patient is doing well.  Recommend clinical breast exams in the breast surgical suite in 1 year(s)  Mammography in 6 month(s)  Those on Aromatase Inhibitors should follow up with medical oncologist / PCP for monitoring bone health   Recommend an active lifestyle, healthy diet, limited alcohol intake, achieve and maintain a healthy BMI to optimize breast cancer outcomes / decrease risk of breast cancer.  Referral to or Follow up with Medical Oncology  Follow up with PCP regarding all

## 2025-01-22 ENCOUNTER — TELEMEDICINE (OUTPATIENT)
Dept: PHARMACY | Facility: HOSPITAL | Age: 65
End: 2025-01-22
Payer: COMMERCIAL

## 2025-01-22 DIAGNOSIS — E11.9 TYPE 2 DIABETES MELLITUS WITHOUT COMPLICATION, WITHOUT LONG-TERM CURRENT USE OF INSULIN (MULTI): ICD-10-CM

## 2025-01-22 NOTE — PROGRESS NOTES
Clinical Pharmacy Appointment    Patient ID: Emelina Powell is a 64 y.o. female who presents for Weight Loss/Pre-diabetes.    Pt is here for Follow Up appointment.     Referring Provider: Destiny Almendarez, *  PCP: Destiny Almendarez MD   Last visit with PCP: 1.14.25  Next visit with PCP: 7.16.25    Subjective   Interval History:  Continues with Mounjaro 5 mg weekly (has about 2 months left of therapy)  SE:  Appetite suppression has lessened over time  Weight loss plateau, however has lost ~20 pounds since August 2024 (243 pounds -->223 pounds)  Reports not as active recently; hip hurting but not constant - tendinopathy?  Mounjaro PA denied due to not meeting insurance requirements, however Zepbound approved for weight loss/sleep apnea    HPI  PRE-DIABETES:    Does patient follow with Endocrinology: No     Current diabetic medications include:  Metformin 500 mg daily  Jardiance 10 mg daily  Mounjaro 5 mg subcutaneously once weekly    Clarifications to above regimen: None  Adverse Effects: Denies    Glucose Readings:  Glucometer/CGM Type: Does not test blood glucose    Any episodes of hypoglycemia? No, patient denies .  Did patient treat episode of hypoglycemia appropriately? N/A  Does pt have proteinuria? No    Lifestyle:  Diet: Has seen dietician in the past  Reports this is the most difficult for her  Physical Activity: does strength training 2-3 times per week, less cardio/activity lately    Secondary Prevention:  Statin? Yes  ACE-I/ARB? No  Aspirin? No    Pertinent PMH Review:  PMH of Pancreatitis: No  PMH of Retinopathy: No  PMH of Urinary Tract Infections: No  PMH of MTC: No    Drug Interactions  No relevant drug interactions were noted.    Objective   No Known Allergies  Social History     Social History Narrative    Not on file      Medication Review  Current Outpatient Medications   Medication Instructions    amoxicillin (AMOXIL) 500 mg    anastrozole (ARIMIDEX) 1 mg, Daily    atorvastatin  (Lipitor) 20 mg tablet TAKE 1 TABLET BY MOUTH EVERY DAY    biotin 5 mg tablet Take by mouth.    Ca comb no.1/vit D3/B6/FA/B12 (VITAMIN D3, CALCIUM CIT-PHOS, ORAL) Take by mouth.    doxycycline (VIBRAMYCIN) 100 mg, Daily    Jardiance 10 mg, oral, Daily    levothyroxine (Synthroid, Levoxyl) 25 mcg tablet TAKE 1 TABLET BY MOUTH EVERY DAY AS DIRECTED    loratadine (CLARITIN) 10 mg, Daily    meloxicam (MOBIC) 15 mg, oral, Daily PRN    metFORMIN (GLUCOPHAGE) 500 mg, oral, Nightly    Mounjaro 5 mg, subcutaneous, Weekly    spironolactone (ALDACTONE) 25 mg, oral, Daily    valACYclovir (Valtrex) 500 mg tablet 1 tablet, 2 times daily      Vitals  BP Readings from Last 2 Encounters:   01/14/25 104/78   01/07/25 129/86     BMI Readings from Last 1 Encounters:   01/14/25 35.45 kg/m²      Labs  A1C  Lab Results   Component Value Date    HGBA1C 5.9 (H) 07/10/2024    HGBA1C 5.7 (H) 03/20/2024    HGBA1C 5.7 (A) 09/29/2023     BMP  Lab Results   Component Value Date    CALCIUM 10.0 07/10/2024     07/10/2024    K 4.8 07/10/2024    CO2 28 07/10/2024     07/10/2024    BUN 17 07/10/2024    CREATININE 0.70 07/10/2024    EGFR >90 07/10/2024     LFTs  Lab Results   Component Value Date    ALT 23 07/10/2024    AST 20 07/10/2024    ALKPHOS 121 07/10/2024    BILITOT 0.6 07/10/2024     FLP  Lab Results   Component Value Date    TRIG 228 (H) 03/20/2024    CHOL 158 03/20/2024    LDLF 60 07/06/2023    LDLCALC 59 03/20/2024    HDL 53.3 03/20/2024     Urine Microalbumin  Lab Results   Component Value Date    MICROALBCREA SEE COMMENT 09/29/2023     Weight Management  Wt Readings from Last 3 Encounters:   01/14/25 101 kg (223 lb)   01/07/25 100 kg (221 lb)   08/05/24 110 kg (243 lb)      There is no height or weight on file to calculate BMI.     Assessment/Plan   Problem List Items Addressed This Visit       Type 2 diabetes mellitus without complication, without long-term current use of insulin (Multi)     Patient's goal A1c is < 7%.  Is pt  at goal? Yes, 5.9% on 7.10.24  Patient's SMBGs are not available today, but likely at goal.     Rationale for plan: Will plan to continue switch from Mounjaro to Zepbound as Zepbound is covered under patient's current insurance plan. Patient is to finish Mounjaro 5 mg weekly, then increase to Zepbound 7.5 mg weekly x1 month.    Medication Changes:  CONTINUE:  Metformin 500 mg daily  Jardiance 10 mg daily  Mounjaro 5 mg subcutaneously once weekly then increase to Zepbound 7.5 mg weekly thereafter    Monitoring and Education:  Zepbound Education:   Counseled patient on Zepbound MOA, expectations, side effects, duration of therapy, administration, and monitoring parameters.  Provided detailed dosing and administration counseling to ensure proper technique.   Reviewed Zepbound titration schedule, starting with 2.5 mg once weekly to a goal of 15 mg once weekly if tolerated  Counseled patient on the benefits of GLP-1ra glycemic control and weight loss  Reviewed storage requirements when not in use  Advised patient that they may experience improved satiety after meals and portion sizes of meals may be reduced as doses of Zepbound increase.  Answered all patient questions and concerns          Clinical Pharmacist follow-up: 2.18.25 @ 11 am, Telehealth visit    Continue all meds under the continuation of care with the referring provider and clinical pharmacy team.    Thank you,  Linda Khoury, PharmD  Clinical Pharmacist  [phone]    Verbal consent to manage patient's drug therapy was obtained from the patient. They were informed they may decline to participate or withdraw from participation in pharmacy services at any time.

## 2025-01-22 NOTE — ASSESSMENT & PLAN NOTE
Patient's goal A1c is < 7%.  Is pt at goal? Yes, 5.9% on 7.10.24  Patient's SMBGs are not available today, but likely at goal.     Rationale for plan: Will plan to continue switch from Mounjaro to Zepbound as Zepbound is covered under patient's current insurance plan. Patient is to finish Mounjaro 5 mg weekly, then increase to Zepbound 7.5 mg weekly x1 month.    Medication Changes:  CONTINUE:  Metformin 500 mg daily  Jardiance 10 mg daily  Mounjaro 5 mg subcutaneously once weekly then increase to Zepbound 7.5 mg weekly thereafter    Monitoring and Education:  Zepbound Education:   Counseled patient on Zepbound MOA, expectations, side effects, duration of therapy, administration, and monitoring parameters.  Provided detailed dosing and administration counseling to ensure proper technique.   Reviewed Zepbound titration schedule, starting with 2.5 mg once weekly to a goal of 15 mg once weekly if tolerated  Counseled patient on the benefits of GLP-1ra glycemic control and weight loss  Reviewed storage requirements when not in use  Advised patient that they may experience improved satiety after meals and portion sizes of meals may be reduced as doses of Zepbound increase.  Answered all patient questions and concerns

## 2025-02-09 LAB
ALBUMIN SERPL-MCNC: 4.2 G/DL (ref 3.6–5.1)
ALP SERPL-CCNC: 129 U/L (ref 37–153)
ALT SERPL-CCNC: 22 U/L (ref 6–29)
ANION GAP SERPL CALCULATED.4IONS-SCNC: 11 MMOL/L (CALC) (ref 7–17)
AST SERPL-CCNC: 17 U/L (ref 10–35)
BASOPHILS # BLD AUTO: 32 CELLS/UL (ref 0–200)
BASOPHILS NFR BLD AUTO: 0.7 %
BILIRUB SERPL-MCNC: 0.5 MG/DL (ref 0.2–1.2)
BUN SERPL-MCNC: 18 MG/DL (ref 7–25)
CALCIUM SERPL-MCNC: 9.4 MG/DL (ref 8.6–10.4)
CHLORIDE SERPL-SCNC: 107 MMOL/L (ref 98–110)
CHOLEST SERPL-MCNC: 168 MG/DL
CHOLEST/HDLC SERPL: 3.4 (CALC)
CO2 SERPL-SCNC: 25 MMOL/L (ref 20–32)
CREAT SERPL-MCNC: 0.7 MG/DL (ref 0.5–1.05)
EGFRCR SERPLBLD CKD-EPI 2021: 97 ML/MIN/1.73M2
EOSINOPHIL # BLD AUTO: 69 CELLS/UL (ref 15–500)
EOSINOPHIL NFR BLD AUTO: 1.5 %
ERYTHROCYTE [DISTWIDTH] IN BLOOD BY AUTOMATED COUNT: 13.5 % (ref 11–15)
EST. AVERAGE GLUCOSE BLD GHB EST-MCNC: 128 MG/DL
EST. AVERAGE GLUCOSE BLD GHB EST-SCNC: 7.1 MMOL/L
GLUCOSE SERPL-MCNC: 125 MG/DL (ref 65–99)
HBA1C MFR BLD: 6.1 % OF TOTAL HGB
HCT VFR BLD AUTO: 42 % (ref 35–45)
HDLC SERPL-MCNC: 50 MG/DL
HGB BLD-MCNC: 13.7 G/DL (ref 11.7–15.5)
LDLC SERPL CALC-MCNC: 86 MG/DL (CALC)
LYMPHOCYTES # BLD AUTO: 1647 CELLS/UL (ref 850–3900)
LYMPHOCYTES NFR BLD AUTO: 35.8 %
MCH RBC QN AUTO: 28.7 PG (ref 27–33)
MCHC RBC AUTO-ENTMCNC: 32.6 G/DL (ref 32–36)
MCV RBC AUTO: 87.9 FL (ref 80–100)
MONOCYTES # BLD AUTO: 313 CELLS/UL (ref 200–950)
MONOCYTES NFR BLD AUTO: 6.8 %
NEUTROPHILS # BLD AUTO: 2539 CELLS/UL (ref 1500–7800)
NEUTROPHILS NFR BLD AUTO: 55.2 %
NONHDLC SERPL-MCNC: 118 MG/DL (CALC)
PLATELET # BLD AUTO: 265 THOUSAND/UL (ref 140–400)
PMV BLD REES-ECKER: 11 FL (ref 7.5–12.5)
POTASSIUM SERPL-SCNC: 4.1 MMOL/L (ref 3.5–5.3)
PROT SERPL-MCNC: 6.6 G/DL (ref 6.1–8.1)
RBC # BLD AUTO: 4.78 MILLION/UL (ref 3.8–5.1)
SODIUM SERPL-SCNC: 143 MMOL/L (ref 135–146)
T4 FREE SERPL-MCNC: 1 NG/DL (ref 0.8–1.8)
TRIGL SERPL-MCNC: 230 MG/DL
TSH SERPL-ACNC: 1.88 MIU/L (ref 0.4–4.5)
WBC # BLD AUTO: 4.6 THOUSAND/UL (ref 3.8–10.8)

## 2025-02-18 ENCOUNTER — APPOINTMENT (OUTPATIENT)
Dept: PHARMACY | Facility: HOSPITAL | Age: 65
End: 2025-02-18
Payer: COMMERCIAL

## 2025-02-18 DIAGNOSIS — E11.9 TYPE 2 DIABETES MELLITUS WITHOUT COMPLICATION, WITHOUT LONG-TERM CURRENT USE OF INSULIN (MULTI): ICD-10-CM

## 2025-02-18 RX ORDER — TIRZEPATIDE 5 MG/.5ML
5 INJECTION, SOLUTION SUBCUTANEOUS WEEKLY
Qty: 6 ML | Refills: 0 | Status: SHIPPED | OUTPATIENT
Start: 2025-02-18

## 2025-02-18 NOTE — PROGRESS NOTES
Clinical Pharmacy Appointment    Patient ID: Emelina Powell is a 64 y.o. female who presents for Weight Loss/Pre-diabetes.    Pt is here for Follow Up appointment.     Referring Provider: Destiny Almendarez, *  PCP: Destiny Almendarez MD   Last visit with PCP: 25  Next visit with PCP: 25    Subjective   Interval History:  Continues with Mounjaro 5 mg weekly   SE:  Nausea since restarting therapy this past weekend  Weight loss plateau, however has lost ~20 pounds since 2024 (243 pounds -->223 pounds)  Mounjaro PA approved due to fasting blood glucose readings  Going to [facility] at end of 2025    HPI  PRE-DIABETES:    Does patient follow with Endocrinology: No     Current diabetic medications include:  Metformin 500 mg daily  Jardiance 10 mg daily  Mounjaro 5 mg subcutaneously once weekly    Clarifications to above regimen: None  Adverse Effects: Nausea    Glucose Readings:  Glucometer/CGM Type: Does not test blood glucose  Reports fasting B mg/dL    Any episodes of hypoglycemia? No, patient denies .  Did patient treat episode of hypoglycemia appropriately? N/A  Does pt have proteinuria? No    Lifestyle:  Diet: Has seen dietician in the past  Reports this is the most difficult for her  Physical Activity: does strength training 2-3 times per week, less cardio/activity lately    Secondary Prevention:  Statin? Yes  ACE-I/ARB? No  Aspirin? No    Pertinent PMH Review:  PMH of Pancreatitis: No  PMH of Retinopathy: No  PMH of Urinary Tract Infections: No  PMH of MTC: No    Drug Interactions  No relevant drug interactions were noted.    Objective   No Known Allergies  Social History     Social History Narrative    Not on file      Medication Review  Current Outpatient Medications   Medication Instructions    amoxicillin (AMOXIL) 500 mg    anastrozole (ARIMIDEX) 1 mg, Daily    atorvastatin (Lipitor) 20 mg tablet TAKE 1 TABLET BY MOUTH EVERY DAY    biotin 5 mg tablet Take by mouth.    Ca  comb no.1/vit D3/B6/FA/B12 (VITAMIN D3, CALCIUM CIT-PHOS, ORAL) Take by mouth.    doxycycline (VIBRAMYCIN) 100 mg, Daily    Jardiance 10 mg, oral, Daily    levothyroxine (Synthroid, Levoxyl) 25 mcg tablet TAKE 1 TABLET BY MOUTH EVERY DAY AS DIRECTED    loratadine (CLARITIN) 10 mg, Daily    meloxicam (MOBIC) 15 mg, oral, Daily PRN    metFORMIN (GLUCOPHAGE) 500 mg, oral, Nightly    spironolactone (ALDACTONE) 25 mg, oral, Daily    valACYclovir (Valtrex) 500 mg tablet 1 tablet, 2 times daily      Vitals  BP Readings from Last 2 Encounters:   01/14/25 104/78   01/07/25 129/86     BMI Readings from Last 1 Encounters:   01/14/25 35.45 kg/m²      Labs  A1C  Lab Results   Component Value Date    HGBA1C 6.1 (H) 02/07/2025    HGBA1C 5.9 (H) 07/10/2024    HGBA1C 5.7 (H) 03/20/2024     BMP  Lab Results   Component Value Date    CALCIUM 9.4 02/07/2025     02/07/2025    K 4.1 02/07/2025    CO2 25 02/07/2025     02/07/2025    BUN 18 02/07/2025    CREATININE 0.70 02/07/2025    EGFR 97 02/07/2025     LFTs  Lab Results   Component Value Date    ALT 22 02/07/2025    AST 17 02/07/2025    ALKPHOS 129 02/07/2025    BILITOT 0.5 02/07/2025     FLP  Lab Results   Component Value Date    TRIG 230 (H) 02/07/2025    CHOL 168 02/07/2025    LDLF 60 07/06/2023    LDLCALC 86 02/07/2025    HDL 50 02/07/2025     Urine Microalbumin  Lab Results   Component Value Date    MICROALBCREA SEE COMMENT 09/29/2023     Weight Management  Wt Readings from Last 3 Encounters:   01/14/25 101 kg (223 lb)   01/07/25 100 kg (221 lb)   08/05/24 110 kg (243 lb)      There is no height or weight on file to calculate BMI.     Assessment/Plan   Problem List Items Addressed This Visit       Type 2 diabetes mellitus without complication, without long-term current use of insulin (Multi)     Patient's goal A1c is < 7%.  Is pt at goal? Yes, 6.1% on 2.7.25  Patient's SMBGs are at goal.  Rationale for plan: Will plan to continue Mounjaro 5 mg weekly x3 more weeks,  then increase to Zepbound 7.5 mg weekly thereafter x4 weeks (as patient already picked up this prescription from the pharmacy). Then, given Mounjaro prior authorization was approved by insurance, will plan to switch back to Mounjaro after completion of Zepbound 7.5 mg.    Medication Changes:  CONTINUE:  Metformin 500 mg daily  Jardiance 10 mg daily  Mounjaro 5 mg subcutaneously once weekly then increase to Zepbound 7.5 mg weekly thereafter    Monitoring and Education:  Zepbound Education:   Counseled patient on Zepbound MOA, expectations, side effects, duration of therapy, administration, and monitoring parameters.  Provided detailed dosing and administration counseling to ensure proper technique.   Reviewed Zepbound titration schedule, starting with 2.5 mg once weekly to a goal of 15 mg once weekly if tolerated  Counseled patient on the benefits of GLP-1ra glycemic control and weight loss  Reviewed storage requirements when not in use  Advised patient that they may experience improved satiety after meals and portion sizes of meals may be reduced as doses of Zepbound increase.  Answered all patient questions and concerns          Clinical Pharmacist follow-up: 4.1.25 @ 11 am, Telehealth visit    Continue all meds under the continuation of care with the referring provider and clinical pharmacy team.    Thank you,  Linda Khoury, PharmD  Clinical Pharmacist  [phone]    Verbal consent to manage patient's drug therapy was obtained from the patient. They were informed they may decline to participate or withdraw from participation in pharmacy services at any time.

## 2025-02-18 NOTE — ASSESSMENT & PLAN NOTE
Patient's goal A1c is < 7%.  Is pt at goal? Yes, 6.1% on 2.7.25  Patient's SMBGs are at goal.  Rationale for plan: Will plan to continue Mounjaro 5 mg weekly x3 more weeks, then increase to Zepbound 7.5 mg weekly thereafter x4 weeks (as patient already picked up this prescription from the pharmacy). Then, given Mounjaro prior authorization was approved by insurance, will plan to switch back to Mounjaro after completion of Zepbound 7.5 mg.    Medication Changes:  CONTINUE:  Metformin 500 mg daily  Jardiance 10 mg daily  Mounjaro 5 mg subcutaneously once weekly then increase to Zepbound 7.5 mg weekly thereafter    Monitoring and Education:  Zepbound Education:   Counseled patient on Zepbound MOA, expectations, side effects, duration of therapy, administration, and monitoring parameters.  Provided detailed dosing and administration counseling to ensure proper technique.   Reviewed Zepbound titration schedule, starting with 2.5 mg once weekly to a goal of 15 mg once weekly if tolerated  Counseled patient on the benefits of GLP-1ra glycemic control and weight loss  Reviewed storage requirements when not in use  Advised patient that they may experience improved satiety after meals and portion sizes of meals may be reduced as doses of Zepbound increase.  Answered all patient questions and concerns

## 2025-02-20 PROBLEM — Z12.31 ENCOUNTER FOR SCREENING MAMMOGRAM FOR BREAST CANCER: Status: RESOLVED | Noted: 2025-01-21 | Resolved: 2025-02-20

## 2025-03-02 DIAGNOSIS — R73.03 PREDIABETES: ICD-10-CM

## 2025-03-03 RX ORDER — METFORMIN HYDROCHLORIDE 500 MG/1
500 TABLET ORAL NIGHTLY
Qty: 90 TABLET | Refills: 3 | Status: SHIPPED | OUTPATIENT
Start: 2025-03-03

## 2025-03-04 ENCOUNTER — APPOINTMENT (OUTPATIENT)
Dept: PRIMARY CARE | Facility: CLINIC | Age: 65
End: 2025-03-04
Payer: COMMERCIAL

## 2025-03-04 ASSESSMENT — PATIENT HEALTH QUESTIONNAIRE - PHQ9
4. FEELING TIRED OR HAVING LITTLE ENERGY: SEVERAL DAYS
5. POOR APPETITE OR OVEREATING: NOT AT ALL
SUM OF ALL RESPONSES TO PHQ QUESTIONS 1-9: 3
1. LITTLE INTEREST OR PLEASURE IN DOING THINGS: SEVERAL DAYS
7. TROUBLE CONCENTRATING ON THINGS, SUCH AS READING THE NEWSPAPER OR WATCHING TELEVISION: NOT AT ALL
10. IF YOU CHECKED OFF ANY PROBLEMS, HOW DIFFICULT HAVE THESE PROBLEMS MADE IT FOR YOU TO DO YOUR WORK, TAKE CARE OF THINGS AT HOME, OR GET ALONG WITH OTHER PEOPLE: SOMEWHAT DIFFICULT
8. MOVING OR SPEAKING SO SLOWLY THAT OTHER PEOPLE COULD HAVE NOTICED. OR THE OPPOSITE, BEING SO FIGETY OR RESTLESS THAT YOU HAVE BEEN MOVING AROUND A LOT MORE THAN USUAL: NOT AT ALL
2. FEELING DOWN, DEPRESSED OR HOPELESS: NOT AT ALL
SUM OF ALL RESPONSES TO PHQ9 QUESTIONS 1 & 2: 1
9. THOUGHTS THAT YOU WOULD BE BETTER OFF DEAD, OR OF HURTING YOURSELF: NOT AT ALL
3. TROUBLE FALLING OR STAYING ASLEEP: NOT AT ALL
6. FEELING BAD ABOUT YOURSELF - OR THAT YOU ARE A FAILURE OR HAVE LET YOURSELF OR YOUR FAMILY DOWN: SEVERAL DAYS

## 2025-03-04 ASSESSMENT — ANXIETY QUESTIONNAIRES
1. FEELING NERVOUS, ANXIOUS, OR ON EDGE: NOT AT ALL
GAD7 TOTAL SCORE: 1
3. WORRYING TOO MUCH ABOUT DIFFERENT THINGS: NOT AT ALL
5. BEING SO RESTLESS THAT IT IS HARD TO SIT STILL: NOT AT ALL
2. NOT BEING ABLE TO STOP OR CONTROL WORRYING: NOT AT ALL
7. FEELING AFRAID AS IF SOMETHING AWFUL MIGHT HAPPEN: NOT AT ALL
6. BECOMING EASILY ANNOYED OR IRRITABLE: SEVERAL DAYS
4. TROUBLE RELAXING: NOT AT ALL
IF YOU CHECKED OFF ANY PROBLEMS ON THIS QUESTIONNAIRE, HOW DIFFICULT HAVE THESE PROBLEMS MADE IT FOR YOU TO DO YOUR WORK, TAKE CARE OF THINGS AT HOME, OR GET ALONG WITH OTHER PEOPLE: SOMEWHAT DIFFICULT

## 2025-03-04 NOTE — PROGRESS NOTES
Collaborative Care ([facility]) Initial Assessment    Session Time  Start: 10:00am  End: 11:17am     Collaborative Care program information (including case discussion with psychiatry, involvement of [facility] and billing when applicable) was provided and discussed with the patient. Patient Indicated understanding and agreed to proceed.   Confirm: Yes    Patient Health Questionnaire-9 Score: 3 (3/4/2025  2:30 PM)  MARIALUISA-7 Total Score: 1 (3/4/2025  2:31 PM)    Reason for Visit / Chief Complaint  Chief Complaint   Patient presents with    Depression    Anxiety     Accompanied by: Self  Guardian Status: Self  Caregiver Status: Does not have a caregiver    Originally referred for, “I asked for the referral, I want to change some stuff about myself or how I react to stuff”.     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 Defines her “mind won't stop”, reported meditating doesn't resonate with her because of her mind racing.     Self-Esteem / Self-Image   Self-Esteem / Self Image Sx: struggles with confidence    Appetite   Description of Overall Appetite: denied any concerns    Anger / Irritability  Symptoms of Anger / Irritability: Anger: “My normal is ignore things, retreat into myself” Avoidance     Trauma    Symptoms Onset/Duration: symptoms more than one month  Traumatic Experiences: Childhood/Trauma: “It's very hard to grow up as a twin, as the strong twin, the independent twin because you don't get any help. Your sibling always gets the help”. - “I believe I became strong and  independent because I had to, because my sister always needed help. Always with her homework, always when she started having a family”. My mother defined me as the smart one, “she did not mean it in a good way”.     Grief / Loss / Adjustment   Symptom Onset/Duration: more than 1 year  Current Sx: Grief/Adjustment: “I am not necessarily grieving, of course I miss my parents. I have lost trust in the people I thought I could trust”.     Patient reported that she does not have many supports, one close friend that she was very close with during covid. This friendship ended between patient and herself.  still continues this platonic relationship. Questions the state of this friendship.    Patient also endorsed that her older brother has been somewhat estranged from her family. Kept from her that he had cancer.     Learning Concerns / Memory   Learning Concerns & Sx: none, denied  Memory Concerns & Sx: none, denied    Functional impairment   Impacting Ability : ADL's: Impacting relationships/negative thought patterns - “It doesn't really impact it at all, my  might have something else to say about that.  My whole life has been me retreating”.     Associated Medical Concerns   Potential Associated Factors: Physically, has a hip issue that flairs up and impacts her walking abilities / cancer survivor     Comprehensive Behavioral Health History     Medications  Current Mental Health Medications:   No current meds - “I don't know” when asked about medication for mental health     Past Mental Health Medications:   No hx of medications    Open to medication recommendations from consulting psychiatrist? Yes    Mental Health Treatment History  Mental Health Treatment: None    Risk History  Suicidal Thoughts/Method/Intent/Plan: None, denied    Substance Use History    Substances    Social History     Substance and Sexual Activity   Alcohol Use Never     Social History     Substance and Sexual Activity   Drug Use  Never       Substance Current Use   No patient hx of substance use                   Family History    Mental Health / Conditions    Family Member Condition / Diagnosis Medications / Side Effects   Mother was on an antidepressant in her older years                       Substance Use    Family Member Substance Current Use   Brother who passed away was an alcoholic and HANNAH                       History of Suicide    Family Member Details             Social History    Housing   Living Situation: Lives with ,  for 44 years small dog   Safe Housing Conditions / Feels Safe in Home: Yes    Employment  Current Employment: Retired -  30 years   Current Concerns/Challenges: No    Income   Current Concerns/Challenges: Yes, describe: Retired   Receive Benefits/Assistance: No    Education   Status / Level of Education: Two year associates degree    Legal   Legal Considerations: No legal matters    Relationships   S/O:  Wei,  for 44 years, “It's interesting. We get along, I'm struggling with some stuff right now but I don't think he knows”.   Parents/Guardian: Parents are  - father 2019, mother 4 years  Siblings: One sibling  and twin sister and older brother--“thought” they were close   Other: Two sons (45yo, 41yo) / Two grandchildren -live close     Tenriism/ Spirituality   Are you Restoration or Spiritual: Non spiritual     Coping / Strengths / Supports   Coping:  Coping skills: Patient shared, “I like to work out I do strength training. I like hiking. I like traveling. I do some crocheting, reading I like to read”.   Strengths: Strengths: Patient shared, “I am a very strong person”.   Supports: Support: Myself     Assessment Summary  / Plan    Assessment Summary:  What do you want to work on/get out of collaborative care? Goal: “I want help with not immediately going to 'there'”, patient reported that she immediately internalizes everything. Feels that her family has  a lack of trust in her. Experiencing some cognitive distortions. “I would like to learn how to not personalize everything that happens, I immediately go there”.     CBT / Values work / Communication work     Plan:   Psych consult - ongoing, once a month, Zxwintv-Xldtyykx-Oaxpbrwl interventions, and provide psycho-education    Follow up in 2 weeks (on 3/18/2025).    Provisional Findings / Impressions  Primary: Patient is a 64 year old female referred to Collaborative Care for support in managing anxiety and depression. Increase in severity of sxs over the last six months, adjusting to some family dynamic changes. Patient endorsed her primary concerns are easily annoyed/irritable, avoidance, and unhelpful thinking patterns. Defines her “mind won't stop”, reported meditating doesn't resonate with her because of her mind racing.     Medications  Current Mental Health Medications:   No current meds - “I don't know” when asked about medication for mental health     Past Mental Health Medications:   No hx of medications

## 2025-03-06 ENCOUNTER — DOCUMENTATION (OUTPATIENT)
Dept: BEHAVIORAL HEALTH | Facility: CLINIC | Age: 65
End: 2025-03-06
Payer: COMMERCIAL

## 2025-03-06 NOTE — PROGRESS NOTES
[facility] Consult Note     Emelina Powell is a 64 y.o., referred to Collaborative Care for symptoms of depression and anxiety. I have reviewed the patient with the behavioral health manager and reviewed the patient's electronic record. Worse over the past 6 months in setting of social stressors. Tried meditating and didn't work.     Current Meds: None      Recommendations:   Psychotherapy  No meds for now      Patient Health Questionnaire-9 Score: 3 (3/4/2025  2:30 PM)  MARIALUISA-7 Total Score: 1 (3/4/2025  2:31 PM)      The above treatment considerations and suggestions are based on consultations with the patient's care manager and a review of information available in the electronic medical record. I have not personally examined the patient. All recommendations should be implemented with consideration of the patient's relevant prior history and current clinical status. Please feel free to call me with any questions about the care of this patient.

## 2025-03-18 ENCOUNTER — APPOINTMENT (OUTPATIENT)
Dept: PRIMARY CARE | Facility: CLINIC | Age: 65
End: 2025-03-18
Payer: COMMERCIAL

## 2025-03-19 NOTE — PROGRESS NOTES
Collaborative Care ([facility])  Progress Note    Type of Interaction: In Office    Start Time: 12:37pm    End Time: 1:36pm    Appointment: Not Scheduled    Reason for Visit:   Chief Complaint   Patient presents with    Depression    Anxiety     Interval History / Patient Symptoms:     Occ anxious/down     Interventions Provided: [place] Setting, Psychoeducation, Acceptance & Commitment Therapy, Motivational Interviewing, Strengths Exploration, Values Exploration, Develop Coping Strategies, Review Progress/Goals Stress Management, Mindfulness, Treatment Planning, and CBT & Radical Acceptance     Progress Made: Minimum    Response to Intervention: Patient shared today that there have been minimal changes with communicating her feelings since last session. Processed how others make her feel (family//ex friend) by their actions. Explored healthy and assertive communication skills vs avoidance. Validated patient's emotions while attempting to utilize a decisional balance of the pros and cons to communicate with others how she feels. Explored ways to increase self esteem, shifting focus to what she has control over vs what she does not.     Plan: Upcoming trip to [place], continue to support patient with healthy communication and processing/resolving avoidance tendencies.     Patient Instructions   4/14 @ 1:30pm in person     Follow Up / Next Appointment: Next appointment: 04/14/25

## 2025-03-31 ENCOUNTER — DOCUMENTATION (OUTPATIENT)
Dept: PRIMARY CARE | Facility: CLINIC | Age: 65
End: 2025-03-31
Payer: COMMERCIAL

## 2025-03-31 DIAGNOSIS — F43.23 ADJUSTMENT DISORDER WITH MIXED ANXIETY AND DEPRESSED MOOD: Primary | ICD-10-CM

## 2025-04-01 ENCOUNTER — APPOINTMENT (OUTPATIENT)
Dept: PHARMACY | Facility: HOSPITAL | Age: 65
End: 2025-04-01
Payer: COMMERCIAL

## 2025-04-01 DIAGNOSIS — E11.9 TYPE 2 DIABETES MELLITUS WITHOUT COMPLICATION, WITHOUT LONG-TERM CURRENT USE OF INSULIN: ICD-10-CM

## 2025-04-01 RX ORDER — TIRZEPATIDE 10 MG/.5ML
10 INJECTION, SOLUTION SUBCUTANEOUS
Qty: 6 ML | Refills: 0 | Status: SHIPPED | OUTPATIENT
Start: 2025-04-01

## 2025-04-01 NOTE — PROGRESS NOTES
Clinical Pharmacy Appointment    Patient ID: Emelina Powell is a 64 y.o. female who presents for Weight Loss/Pre-diabetes.    Pt is here for Follow Up appointment.     Referring Provider: Destiny Almendarez, *  PCP: Destiny Almendarez MD   Last visit with PCP: 1.14.25  Next visit with PCP: 7.16.25    Subjective   Interval History:  Increased to Zepbound 7.5 mg once weekly  SE: mild, tolerable nausea at times  Weight loss continues  Going to [place] at end of April 2025 and has enough medication until end of trip  Switching to Medicare insurance in July 2026    HPI  PRE-DIABETES:    Does patient follow with Endocrinology: No     Current diabetic medications include:  Metformin 500 mg daily  Jardiance 10 mg daily  Zepbound 7.5 mg subcutaneously once weekly    Clarifications to above regimen: None  Adverse Effects: Nausea    Glucose Readings:  Glucometer/CGM Type: Does not test blood glucose regularly    Any episodes of hypoglycemia? No, patient denies .  Did patient treat episode of hypoglycemia appropriately? N/A  Does pt have proteinuria? No    Lifestyle:  Diet: Has seen dietician in the past  Reports this is the most difficult for her  Physical Activity: does strength training 2-3 times per week    Secondary Prevention:  Statin? Yes  ACE-I/ARB? No  Aspirin? No    Pertinent PMH Review:  PMH of Pancreatitis: No  PMH of Retinopathy: No  PMH of Urinary Tract Infections: No  PMH of MTC: No    Drug Interactions  No relevant drug interactions were noted.    Objective   No Known Allergies  Social History     Social History Narrative    Not on file      Medication Review  Current Outpatient Medications   Medication Instructions    amoxicillin (AMOXIL) 500 mg    anastrozole (ARIMIDEX) 1 mg, Daily    atorvastatin (Lipitor) 20 mg tablet TAKE 1 TABLET BY MOUTH EVERY DAY    biotin 5 mg tablet Take by mouth.    Ca comb no.1/vit D3/B6/FA/B12 (VITAMIN D3, CALCIUM CIT-PHOS, ORAL) Take by mouth.    doxycycline (VIBRAMYCIN)  100 mg, Daily    Jardiance 10 mg, oral, Daily    levothyroxine (Synthroid, Levoxyl) 25 mcg tablet TAKE 1 TABLET BY MOUTH EVERY DAY AS DIRECTED    loratadine (CLARITIN) 10 mg, Daily    meloxicam (MOBIC) 15 mg, oral, Daily PRN    metFORMIN (GLUCOPHAGE) 500 mg, oral, Nightly    Mounjaro 5 mg, subcutaneous, Weekly    spironolactone (ALDACTONE) 25 mg, oral, Daily    valACYclovir (Valtrex) 500 mg tablet 1 tablet, 2 times daily      Vitals  BP Readings from Last 2 Encounters:   01/14/25 104/78   01/07/25 129/86     BMI Readings from Last 1 Encounters:   01/14/25 35.45 kg/m²      Labs  A1C  Lab Results   Component Value Date    HGBA1C 6.1 (H) 02/07/2025    HGBA1C 5.9 (H) 07/10/2024    HGBA1C 5.7 (H) 03/20/2024     BMP  Lab Results   Component Value Date    CALCIUM 9.4 02/07/2025     02/07/2025    K 4.1 02/07/2025    CO2 25 02/07/2025     02/07/2025    BUN 18 02/07/2025    CREATININE 0.70 02/07/2025    EGFR 97 02/07/2025     LFTs  Lab Results   Component Value Date    ALT 22 02/07/2025    AST 17 02/07/2025    ALKPHOS 129 02/07/2025    BILITOT 0.5 02/07/2025     FLP  Lab Results   Component Value Date    TRIG 230 (H) 02/07/2025    CHOL 168 02/07/2025    LDLF 60 07/06/2023    LDLCALC 86 02/07/2025    HDL 50 02/07/2025     Urine Microalbumin  Lab Results   Component Value Date    MICROALBCREA SEE COMMENT 09/29/2023     Weight Management  Wt Readings from Last 3 Encounters:   01/14/25 101 kg (223 lb)   01/07/25 100 kg (221 lb)   08/05/24 110 kg (243 lb)      There is no height or weight on file to calculate BMI.     Assessment/Plan   Problem List Items Addressed This Visit       Type 2 diabetes mellitus without complication, without long-term current use of insulin     Patient's goal A1c is < 7%.  Is pt at goal? Yes, 6.1% on 2.7.25  Patient's SMBGs are at goal.  Rationale for plan: Will plan to continue Zepbound 7.5 mg weekly until supply gone, then given Mounjaro prior authorization was approved by insurance, will  plan to switch back to Mounjaro after completion of Zepbound 7.5 mg. Will plan to increase Mounjaro to 10 mg weekly for increased appetite suppression/weight loss potential, and aim to stay at this dose for 3-6 months.    Medication Changes:  CONTINUE:  Metformin 500 mg daily  Jardiance 10 mg daily  Zepbound 7.5 mg subcutaneous weekly until gone, then switch and increase to Mounjaro 10 mg subcutaneously once weekly    Monitoring and Education:  Counseled patient on MOA, expectations, side effects, duration of therapy, administration, and monitoring parameters.  Answered all patient questions and concerns          Clinical Pharmacist follow-up: 6.17.25 @ 1 pm, Telehealth visit    Continue all meds under the continuation of care with the referring provider and clinical pharmacy team.    Thank you,  Linda Khoury, PharmD  Clinical Pharmacist  [phone]    Verbal consent to manage patient's drug therapy was obtained from the patient. They were informed they may decline to participate or withdraw from participation in pharmacy services at any time.

## 2025-04-14 ENCOUNTER — APPOINTMENT (OUTPATIENT)
Dept: PRIMARY CARE | Facility: CLINIC | Age: 65
End: 2025-04-14
Payer: COMMERCIAL

## 2025-04-14 NOTE — PATIENT INSTRUCTIONS
Pt endorsed significant progress and stability. For that reason, pt is being closed from [facility] services at this time. If additional support needs arise in the future, pt can be referred back to programming.

## 2025-04-14 NOTE — PROGRESS NOTES
Collaborative Care ([facility])  Progress Note    Type of Interaction: In Office    Start Time: 1:00pm    End Time: 1:35pm    Appointment: Not Scheduled    Reason for Visit:   Chief Complaint   Patient presents with    Depression    Anxiety     Interval History / Patient Symptoms:     Stable     Interventions Provided: Psychoeducation, Motivational Interviewing, Strengths Exploration, Values Exploration, Develop Coping Strategies, Review Progress/Goals Stress Management, Mindfulness, and Treatment Planning    Progress Made: Significant    Response to Intervention: Reading Let Them Theory, enjoys it. Processed through her progress with implementing radical acceptance. Shared that trust issues with partner remain in some aspects, other aspects of life she is trusting. Family stressors however handling well. No further goals setting support needs at this time. Explored areas of life that patient has control over vs what they do not, CBT techniques in challenging her thoughts, and coping skills to improve overall life satisfaction.     Plan: Graduated     Patient Instructions   Pt endorsed significant progress and stability. For that reason, pt is being closed from [facility] services at this time. If additional support needs arise in the future, pt can be referred back to programming.     Follow Up / Next Appointment:  if symptoms worsen or fail to improve

## 2025-04-25 DIAGNOSIS — E11.9 TYPE 2 DIABETES MELLITUS WITHOUT COMPLICATION, WITHOUT LONG-TERM CURRENT USE OF INSULIN: Primary | ICD-10-CM

## 2025-04-30 ENCOUNTER — DOCUMENTATION (OUTPATIENT)
Dept: PRIMARY CARE | Facility: CLINIC | Age: 65
End: 2025-04-30
Payer: COMMERCIAL

## 2025-04-30 ENCOUNTER — DOCUMENTATION (OUTPATIENT)
Dept: PRIMARY CARE | Facility: CLINIC | Age: 65
End: 2025-04-30

## 2025-04-30 DIAGNOSIS — F41.9 ANXIETY DISORDER, UNSPECIFIED TYPE: Primary | ICD-10-CM

## 2025-04-30 PROCEDURE — 99493 SBSQ PSYC COLLAB CARE MGMT: CPT | Performed by: FAMILY MEDICINE

## 2025-06-10 DIAGNOSIS — R73.03 PREDIABETES: ICD-10-CM

## 2025-06-11 RX ORDER — METFORMIN HYDROCHLORIDE 500 MG/1
500 TABLET ORAL NIGHTLY
Qty: 90 TABLET | Refills: 3 | Status: SHIPPED | OUTPATIENT
Start: 2025-06-11

## 2025-06-17 ENCOUNTER — APPOINTMENT (OUTPATIENT)
Dept: PHARMACY | Facility: HOSPITAL | Age: 65
End: 2025-06-17
Payer: COMMERCIAL

## 2025-06-17 DIAGNOSIS — E11.9 TYPE 2 DIABETES MELLITUS WITHOUT COMPLICATION, WITHOUT LONG-TERM CURRENT USE OF INSULIN: ICD-10-CM

## 2025-06-17 NOTE — ASSESSMENT & PLAN NOTE
Patient's goal A1c is < 7%.  Is pt at goal? Yes, 6.1% on 2/7/25  Patient's SMBGs are not reported, however estimated to be at goal based on A1c.  Rationale for plan: Will plan to continue Mounjaro to 10 mg weekly for continued appetite suppression/weight loss potential, and aim to stay at this dose for 3-6 months. Advised patient to reach out to [facility] for refills prior to next appointment.      Medication Changes: None    CONTINUE:  Metformin 500 mg daily  Jardiance 10 mg daily  Mounjaro 10 mg once weekly     Monitoring and Education:  Counseled patient on MOA, expectations, side effects, duration of therapy, administration, and monitoring parameters.  Answered all patient questions and concerns

## 2025-06-17 NOTE — PROGRESS NOTES
Clinical Pharmacy Appointment    Patient ID: Emelina Powell is a 64 y.o. female who presents for Weight Loss and Prediabetes.    Pt is here for Follow Up appointment.      Referring Provider: Destiny Almendarez  PCP: Destiny Almendarez MD   Last visit with PCP: 1/14/25  Next visit with PCP: 7/16/25    Subjective   Interval History:  Started Mounjaro 10 mg once weekly in April  SE: mild, tolerable nausea at times; ivone drops help  Weight loss and appetite suppression continues  Weight: 223 lbs --> 208.5 lbs  Switching to Medicare insurance in July 2026     PRE-DIABETES:    Does patient follow with Endocrinology: No     Current diabetic medications include:  Metformin 500 mg daily  Jardiance 10 mg daily  Mounjaro 10 mg subcutaneously once weekly     Clarifications to above regimen: None  Adverse Effects: Nausea     Glucose Readings:  Glucometer/CGM Type: Does not test blood glucose regularly     Any episodes of hypoglycemia? No, patient denies.    Did patient treat episode of hypoglycemia appropriately? N/A  Does pt have proteinuria? No     Lifestyle:  Diet: Has seen dietician in the past  Reports this is the most difficult for her  Physical Activity: does strength training 2-3 times per week     Secondary Prevention:  Statin? Yes (Atorvastatin 20 mg)  ACE-I/ARB? No  Aspirin? No     Pertinent PMH Review:  PMH of Pancreatitis: No  PMH of Retinopathy: No  PMH of Urinary Tract Infections: No  PMH of MTC: No     Drug Interactions  No relevant drug interactions were noted.    Objective     LMP  (LMP Unknown)      Labs  Lab Results   Component Value Date    BILITOT 0.5 02/07/2025    CALCIUM 9.4 02/07/2025    CO2 25 02/07/2025     02/07/2025    CREATININE 0.70 02/07/2025    GLUCOSE 125 (H) 02/07/2025    ALKPHOS 129 02/07/2025    K 4.1 02/07/2025    PROT 6.6 02/07/2025     02/07/2025    AST 17 02/07/2025    ALT 22 02/07/2025    BUN 18 02/07/2025    ANIONGAP 11 02/07/2025    MG 1.94 07/10/2024     ALBUMIN 4.2 02/07/2025    GFRF >90 09/29/2023     Lab Results   Component Value Date    TRIG 230 (H) 02/07/2025    CHOL 168 02/07/2025    LDLCALC 86 02/07/2025    HDL 50 02/07/2025     Lab Results   Component Value Date    HGBA1C 6.1 (H) 02/07/2025       Medications Ordered Prior to Encounter[1]     Assessment/Plan   Problem List Items Addressed This Visit           ICD-10-CM    Type 2 diabetes mellitus without complication, without long-term current use of insulin E11.9    Patient's goal A1c is < 7%.  Is pt at goal? Yes, 6.1% on 2/7/25  Patient's SMBGs are not reported, however estimated to be at goal based on A1c.  Rationale for plan: Will plan to continue Mounjaro to 10 mg weekly for continued appetite suppression/weight loss potential, and aim to stay at this dose for 3-6 months. Advised patient to reach out to [facility] for refills prior to next appointment.      Medication Changes: None    CONTINUE:  Metformin 500 mg daily  Jardiance 10 mg daily  Mounjaro 10 mg once weekly     Monitoring and Education:  Counseled patient on MOA, expectations, side effects, duration of therapy, administration, and monitoring parameters.  Answered all patient questions and concerns          Gema Ruiz PharmD  Clinical Ambulatory Care Pharmacist  Ph: [phone]    Continue all meds under the continuation of care with the referring provider and clinical pharmacy team.    Clinical Pharmacist follow up: 9/9/25 or sooner as needed based on clinical intervention  Type of Encounter:  Telephone    Verbal consent to manage patient's drug therapy was obtained from the patient. They were informed they may decline to participate or withdraw from participation in pharmacy services at any time.       [1]   Current Outpatient Medications on File Prior to Visit   Medication Sig Dispense Refill    amoxicillin (Amoxil) 500 mg capsule 1 capsule (500 mg). (Patient not taking: Reported on 1/14/2025)      anastrozole (Arimidex) 1 mg tablet Take 1  tablet (1 mg total) by mouth once daily.      atorvastatin (Lipitor) 20 mg tablet TAKE 1 TABLET BY MOUTH EVERY DAY 90 tablet 3    biotin 5 mg tablet Take by mouth.      Ca comb no.1/vit D3/B6/FA/B12 (VITAMIN D3, CALCIUM CIT-PHOS, ORAL) Take by mouth.      doxycycline (Vibramycin) 100 mg capsule Take 1 capsule (100 mg) by mouth once daily.      Jardiance 10 mg TAKE 1 TABLET BY MOUTH EVERY DAY 90 tablet 1    levothyroxine (Synthroid, Levoxyl) 25 mcg tablet TAKE 1 TABLET BY MOUTH EVERY DAY AS DIRECTED 90 tablet 1    loratadine (Claritin) 10 mg tablet Take 1 tablet (10 mg) by mouth once daily.      meloxicam (Mobic) 15 mg tablet TAKE 1 TABLET (15 MG) BY MOUTH ONCE DAILY AS NEEDED FOR MODERATE PAIN (4 - 6). 90 tablet 3    metFORMIN (Glucophage) 500 mg tablet TAKE 1 TABLET BY MOUTH EVERY DAY AT NIGHT 90 tablet 3    spironolactone (Aldactone) 25 mg tablet TAKE 1 TABLET BY MOUTH EVERY DAY 90 tablet 3    tirzepatide (Mounjaro) 10 mg/0.5 mL pen injector Inject 10 mg under the skin every 7 days. 6 mL 0    valACYclovir (Valtrex) 500 mg tablet Take 1 tablet (500 mg) by mouth 2 times a day.      [DISCONTINUED] metFORMIN (Glucophage) 500 mg tablet TAKE 1 TABLET BY MOUTH EVERY DAY AT NIGHT 90 tablet 3     No current facility-administered medications on file prior to visit.

## 2025-07-02 ENCOUNTER — HOSPITAL ENCOUNTER (OUTPATIENT)
Dept: WOMENS IMAGING | Age: 65
Discharge: HOME | End: 2025-07-04
Payer: MEDICARE

## 2025-07-02 VITALS — WEIGHT: 204 LBS | BODY MASS INDEX: 32.93 KG/M2

## 2025-07-02 DIAGNOSIS — Z12.31 ENCOUNTER FOR SCREENING MAMMOGRAM FOR BREAST CANCER: ICD-10-CM

## 2025-07-02 PROCEDURE — 77063 BREAST TOMOSYNTHESIS BI: CPT

## 2025-07-13 DIAGNOSIS — E03.9 HYPOTHYROIDISM, UNSPECIFIED: ICD-10-CM

## 2025-07-13 DIAGNOSIS — R73.03 PREDIABETES: ICD-10-CM

## 2025-07-14 ENCOUNTER — CLINICAL SUPPORT (OUTPATIENT)
Dept: SLEEP MEDICINE | Facility: HOSPITAL | Age: 65
End: 2025-07-14
Payer: MEDICARE

## 2025-07-14 DIAGNOSIS — G47.33 OBSTRUCTIVE SLEEP APNEA (ADULT) (PEDIATRIC): ICD-10-CM

## 2025-07-14 PROCEDURE — 95806 SLEEP STUDY UNATT&RESP EFFT: CPT | Performed by: INTERNAL MEDICINE

## 2025-07-14 RX ORDER — EMPAGLIFLOZIN 10 MG/1
10 TABLET, FILM COATED ORAL DAILY
Qty: 90 TABLET | Refills: 1 | Status: SHIPPED | OUTPATIENT
Start: 2025-07-14

## 2025-07-14 RX ORDER — LEVOTHYROXINE SODIUM 25 UG/1
25 TABLET ORAL DAILY
Qty: 90 TABLET | Refills: 1 | Status: SHIPPED | OUTPATIENT
Start: 2025-07-14

## 2025-07-14 NOTE — PROGRESS NOTES
.  Type of Study: HOME SLEEP STUDY - NOMAD     The patient received equipment and instructions for use of the [facility] Nomad HSAT device 1014. The patient was instructed how to apply the effort belts, cannula, thermistor. It was also explained how the Nomad and oximeter components work.  The patient was asked to record their sleep for an 8-hour period.     The patient was informed of their responsibility for the device and acknowledged this by signing the HSAT device contract. The patient was asked to return the device on 7/15/2025 by the hour of 9am to [facility].     The patient was instructed to call 911 as usual for any medical- emergencies while at home.  The patient was also given a phone number for troubleshooting when using the device in case there were additional questions.

## 2025-07-16 ENCOUNTER — APPOINTMENT (OUTPATIENT)
Dept: PRIMARY CARE | Facility: CLINIC | Age: 65
End: 2025-07-16
Payer: COMMERCIAL

## 2025-07-16 VITALS
BODY MASS INDEX: 31.86 KG/M2 | WEIGHT: 203 LBS | DIASTOLIC BLOOD PRESSURE: 66 MMHG | HEART RATE: 90 BPM | TEMPERATURE: 97.2 F | OXYGEN SATURATION: 98 % | HEIGHT: 67 IN | SYSTOLIC BLOOD PRESSURE: 118 MMHG | RESPIRATION RATE: 16 BRPM

## 2025-07-16 DIAGNOSIS — Z00.00 PHYSICAL EXAM: ICD-10-CM

## 2025-07-16 DIAGNOSIS — Z85.3 PERSONAL HISTORY OF MALIGNANT NEOPLASM OF BREAST: ICD-10-CM

## 2025-07-16 DIAGNOSIS — E78.2 MIXED HYPERLIPIDEMIA: ICD-10-CM

## 2025-07-16 DIAGNOSIS — R53.83 OTHER FATIGUE: ICD-10-CM

## 2025-07-16 DIAGNOSIS — E03.9 BORDERLINE HYPOTHYROIDISM: ICD-10-CM

## 2025-07-16 DIAGNOSIS — S46.011A STRAIN OF RIGHT ROTATOR CUFF CAPSULE, INITIAL ENCOUNTER: ICD-10-CM

## 2025-07-16 DIAGNOSIS — Z23 NEED FOR VACCINATION: ICD-10-CM

## 2025-07-16 DIAGNOSIS — G25.0 ESSENTIAL TREMOR: ICD-10-CM

## 2025-07-16 DIAGNOSIS — E11.9 TYPE 2 DIABETES MELLITUS WITHOUT COMPLICATION, WITHOUT LONG-TERM CURRENT USE OF INSULIN: ICD-10-CM

## 2025-07-16 DIAGNOSIS — Z00.00 WELCOME TO MEDICARE PREVENTIVE VISIT: ICD-10-CM

## 2025-07-16 DIAGNOSIS — E66.01 OBESITY, MORBID (MULTI): Primary | ICD-10-CM

## 2025-07-16 DIAGNOSIS — M25.551 RIGHT HIP PAIN: ICD-10-CM

## 2025-07-16 DIAGNOSIS — I10 ESSENTIAL HYPERTENSION: ICD-10-CM

## 2025-07-16 PROCEDURE — 90677 PCV20 VACCINE IM: CPT | Performed by: FAMILY MEDICINE

## 2025-07-16 PROCEDURE — 3074F SYST BP LT 130 MM HG: CPT | Performed by: FAMILY MEDICINE

## 2025-07-16 PROCEDURE — G0009 ADMIN PNEUMOCOCCAL VACCINE: HCPCS | Performed by: FAMILY MEDICINE

## 2025-07-16 PROCEDURE — 1170F FXNL STATUS ASSESSED: CPT | Performed by: FAMILY MEDICINE

## 2025-07-16 PROCEDURE — 1159F MED LIST DOCD IN RCRD: CPT | Performed by: FAMILY MEDICINE

## 2025-07-16 PROCEDURE — 1158F ADVNC CARE PLAN TLK DOCD: CPT | Performed by: FAMILY MEDICINE

## 2025-07-16 PROCEDURE — 3078F DIAST BP <80 MM HG: CPT | Performed by: FAMILY MEDICINE

## 2025-07-16 PROCEDURE — 3008F BODY MASS INDEX DOCD: CPT | Performed by: FAMILY MEDICINE

## 2025-07-16 PROCEDURE — 1123F ACP DISCUSS/DSCN MKR DOCD: CPT | Performed by: FAMILY MEDICINE

## 2025-07-16 PROCEDURE — 1160F RVW MEDS BY RX/DR IN RCRD: CPT | Performed by: FAMILY MEDICINE

## 2025-07-16 RX ORDER — MELOXICAM 15 MG/1
15 TABLET ORAL DAILY PRN
Qty: 90 TABLET | Refills: 3 | Status: SHIPPED | OUTPATIENT
Start: 2025-07-16

## 2025-07-16 RX ORDER — DOXYCYCLINE HYCLATE 50 MG/1
50 TABLET, FILM COATED ORAL DAILY
COMMUNITY
Start: 2025-07-14

## 2025-07-16 RX ADMIN — CYANOCOBALAMIN 1000 MCG: 1000 INJECTION, SOLUTION INTRAMUSCULAR; SUBCUTANEOUS at 09:25

## 2025-07-16 ASSESSMENT — ENCOUNTER SYMPTOMS
OCCASIONAL FEELINGS OF UNSTEADINESS: 1
LOSS OF SENSATION IN FEET: 0
DEPRESSION: 0

## 2025-07-16 ASSESSMENT — PATIENT HEALTH QUESTIONNAIRE - PHQ9
1. LITTLE INTEREST OR PLEASURE IN DOING THINGS: NOT AT ALL
SUM OF ALL RESPONSES TO PHQ9 QUESTIONS 1 AND 2: 0
2. FEELING DOWN, DEPRESSED OR HOPELESS: NOT AT ALL

## 2025-07-16 ASSESSMENT — ACTIVITIES OF DAILY LIVING (ADL)
DRESSING: INDEPENDENT
MANAGING_FINANCES: INDEPENDENT
TAKING_MEDICATION: INDEPENDENT
DOING_HOUSEWORK: INDEPENDENT
BATHING: INDEPENDENT
GROCERY_SHOPPING: INDEPENDENT

## 2025-07-16 NOTE — PROGRESS NOTES
Covid vax: x 6  Flu: UTD  Pneumo: UTD  RSV: UTD  Shingles: UTD    CRC: 10/2020  Mammogram: 7/2/2025  Pap: 8/2023  Lmp: n/a    EKG 2020  Has had an eye exam in past 12 months

## 2025-07-16 NOTE — PROGRESS NOTES
Subjective   Reason for Visit: Emelina Powell is a 65 y.o. female here for a Medicare Wellness visit.   Covid vax: x 6  Flu: UTD  Pneumo: UTD  RSV: UTD  Shingles: UTD     CRC: 10/2020  Mammogram: 7/2/2025  Pap: 8/2023  Lmp: n/a     EKG 2020  Has had an eye exam in past 12 months   ldl 86  Just as independent as last yr  Not depressed  Not in crisis    CHECKLIST REVIEWED AND COMPLETE FOR [facility]  Welcome To Medicare, Diabetes, Hypertension, and Hyperlipidemia  ---------------------------------------------------------------------------------------------  Past Medical, Surgical, and Family History reviewed and updated in chart.    Reviewed all medications by prescribing practitioner or clinical pharmacist (such as prescriptions, OTCs, herbal therapies and supplements) and documented in the medical record.    HPI    Patient Self Assessment of Health Status  Patient Self Assessment: Good    Nutrition and Exercise:    Current Diet: HEALTHY Diet always best, minimizing excess carbs,   weight reduction advised if BMI not WNL, please maintain a NORMAL BMI 18.5-24.9    Adequate Fluid Intake: Yes  Caffeine: -aware to minimize intake, <300mg best to even take in LESS  Exercise Frequency: Regularly advised, weight bearing, strengthening, aerobic    Functional Ability/Level of Safety:  Home safety addressed: no active new concerns,   fall risk addressed  NO new hearing issues or concerns  Sanders in ADLs addressed and areas of assistance if present -noted    ANY Cognitive Impairment Observed: No cognitive impairment observed    Home Safety Risk Factors: None  --------------------------------------------------------------------------------------------  Patient Care Team:  Destiny Almendarez MD as PCP - General    HPI  Problem List[1]   Surgical History[2]      PHQ2(-) No active depressed mood or not in crisis, 5min. spent in discussion.    Review of Systems:    NO Seizures  NO CAD  NO CVA    This patient has   NO history of  "recent Covid nor flu symptoms,  NO Fever nor chills,  NO Chest pain, shortness of breath nor paroxysmal nocturnal dyspnea,  NO Nausea, vomiting, nor diarrhea,  NO Hematochezia nor melena,  NO Dysuria, hematuria, nor new incontinence issues  NO new severe headaches nor neurological complaints,  NO new issues with anxiety nor depression nor new psychiatric complaints,  NO suicidal nor homicidal ideations.  ---------------------------------------------------------------------------------------------   OBJECTIVE:  /66   Pulse 90   Temp 36.2 °C (97.2 °F) (Temporal)   Resp 16   Ht 1.689 m (5' 6.5\")   Wt 92.1 kg (203 lb)   LMP  (LMP Unknown)   SpO2 98%   BMI 32.27 kg/m²      General:  alert, oriented, no acute distress.  No obvious skin rashes noted.   No gait disturbance noted.    Mood is pleasant, not tearful, no signs of emotional distress.  Not appearing intoxicated or altered.   No voiced delusions,   Normal, appropriate behavior.    HEENT: Normocephalic, atraumatic,   Pupils round, reactive to light  Extraocular motions intact and wnl  Tympanic membranes normal    Neck: no nuchal rigidity  No masses palpable.  No carotid bruits.  No thyromegaly.    Respiratory: Equal breath sounds  No wheezes,    rales,    nor rhonchi  No respiratory distress.    Heart: Regular rate and rhythm, no    murmurs  no rubs/gallops    Abdomen: no masses palpable, no rebound nor guarding, no rebound nor guarding.  ovwt  Extremities: NO cyanosis noted, no clubbing.   No edema noted.  2+dorsalis pedis pulses.    Normal-not antalgic, steady gait.  No foot lesions  No neuropathy    No visits with results within 3 Month(s) from this visit.   Latest known visit with results is:   Patient Message on 02/10/2025   Component Date Value Ref Range Status    GLUCOSE, PLASMA 02/12/2025 143 (H)  65 - 99 mg/dL Final    Comment:               Fasting reference interval     For someone without known diabetes, a glucose  value >125 mg/dL " indicates that they may have  diabetes and this should be confirmed with a  follow-up test.             Assessment/Plan     Problem List Items Addressed This Visit       Borderline hypothyroidism    Essential hypertension    Essential tremor    Mixed hyperlipidemia    Type 2 diabetes mellitus without complication, without long-term current use of insulin    Personal history of malignant neoplasm of breast     Other Visit Diagnoses         Welcome to Medicare preventive visit          Need for vaccination        Relevant Orders    Pneumococcal conjugate vaccine, 20-valent (PREVNAR 20)          Advance Care Planning Note   Discussion Date: 07/16/25   Discussion Participants: patient  16 min spent discussing ACP w pt    The patient wishes to discuss Advance Care Planning today and the following is a brief summary of our discussion.     Patient has capacity to make their own medical decisions: Yes  Health Care Agent/Surrogate Decision Maker documented in chart: Yes    Documents on file and valid:  Advance Directive/Living Will: advised today  Health Care Power of :  advised today  Communication of Medical Status/Prognosis:   yes   Communication of Treatment Goals/Options:   yes  Treatment Decisions/involved with patient today  yes  Time Statement: Total face to face time spent on advance care planning was <30 minutes with <30 minutes spent in counseling, including the explanation.    SEE ME AT NEXT REGULARLY SCHEDULED VISIT-sooner if condition deteriorates or new problems arise.    PATIENT WOULD LIKE TO BE A FULL CODE    NO uncontrolled DEPRESSION noted  Assessed and reviewed for opioid use.  NO EVIDENCE OF SIGNIFICANT DEMENTIA    Signs and symptoms of concern with depression-if in crisis -(no current HI/SI) will let us know and proceed to ER   Signs/symptoms of concern with dementia-and need to contact us if they occur discussed.    ASCVD   8.9%   addressed and risk reduction conversation took place  Occ hip  pain-will add ortho consult    All above counseling 15 minutes in conversation/documentation etc    Mood counseling 5min- no uncontrolled depression, good support system, has crisis plan if occurs.  Included BMI counseling and options if BMI>30      RBA chronic meds addressed  QUESTIONS answered  Can take mobic prn  Rba  Uses cpap  Wiling to try b12 for fatigue    Down 20#   Doing great  Works out  And again had a lengthy discussion w pt  about risks of poorly controlled diabetes including micro and macrovascular complications of DM2 including blindness,MI,CVA and death among other possibilities. Pt aware and agrees to better -or if good control-continued compliance and adherance to instructions such as regular eye exams q 1-2 y, foot exams,and f/u regularly for hba1c with a goal of 6.5.    Follow up as planned for hba1c and BP checks REGULARLY.  Offered ccs  Will do      Next visit addressed -regular visit as scheduled and follow up sooner if condition deterioration or new problems arise.    This completes Emelina Powell ANNUAL MEDICARE WELLNESS VISIT today.  If no other follow ups discussed: Please follow up in 1 year for NEXT ANNUAL MEDICARE WELLNESS VISIT.  Emelina Powell -be aware that any referrals discussed should be placed today or tests/labs ordered should result in prompt scheduling today.   If not done today-then a phone call for scheduling is expected in a timely manner(within 2 weeks).   If testing is to be done-a result should be available to patient within 2 weeks time unless otherwise specified.   You, the patient or caregiver, are responsible for making sure what was discussed is actually scheduled and completed.  If suboptimal understanding of results of tests or referral reason-a follow up appointment with me should be made.  If above does NOT occur-you are to connect with us for an explanation.    Follow up at next scheduled visit -as planned or directed today.  Sooner if new or unresolved  issues of concern.    Emelina Powell We know you have a choice for your health care, THANK YOU for choosing us and [facility].  We APPRECIATE YOU.  Sincerely,   Destiny Almendarez MD   (dr. Sorto)      This documentation is subject to inadvertent typing and other similar clerical/grammatic errors etc.             [1]   Patient Active Problem List  Diagnosis    Borderline hypothyroidism    Essential hypertension    Essential tremor    HSV-1 (herpes simplex virus 1) infection    HSV-2 (herpes simplex virus 2) infection    Mixed hyperlipidemia    Primary osteoarthritis of both knees    Rosacea    Type 2 diabetes mellitus without complication, without long-term current use of insulin    Personal history of malignant neoplasm of breast    History of syphilis    Ductal carcinoma in situ (DCIS) of right breast    Malignant neoplasm of right female breast, unspecified estrogen receptor status, unspecified site of breast    Obstructive sleep apnea syndrome   [2]   Past Surgical History:  Procedure Laterality Date    BI STEREOTACTIC GUIDED BREAST RIGHT LOCALIZATION AND BIOPSY Right 4/13/2023    BI STEREOTACTIC GUIDED BREAST RIGHT LOCALIZATION AND BIOPSY 4/13/2023    BREAST BIOPSY  06/22/2023    COLONOSCOPY  10/2020    no polyps    ESOPHAGOGASTRODUODENOSCOPY  10/2020    JOINT REPLACEMENT  6/22/2019, 6/24/2022    ORTHODONTIC TREATMENT  2014    OTHER SURGICAL HISTORY  2018    Mohs surgery x 3    TOTAL KNEE ARTHROPLASTY Right 06/2019    WISDOM TOOTH EXTRACTION  1979

## 2025-07-17 LAB
ALBUMIN SERPL-MCNC: 4.6 G/DL (ref 3.6–5.1)
ALP SERPL-CCNC: 127 U/L (ref 37–153)
ALT SERPL-CCNC: 18 U/L (ref 6–29)
ANION GAP SERPL CALCULATED.4IONS-SCNC: 12 MMOL/L (CALC) (ref 7–17)
AST SERPL-CCNC: 17 U/L (ref 10–35)
BILIRUB SERPL-MCNC: 0.6 MG/DL (ref 0.2–1.2)
BUN SERPL-MCNC: 20 MG/DL (ref 7–25)
CALCIUM SERPL-MCNC: 10.2 MG/DL (ref 8.6–10.4)
CHLORIDE SERPL-SCNC: 104 MMOL/L (ref 98–110)
CHOLEST SERPL-MCNC: 141 MG/DL
CHOLEST/HDLC SERPL: 2.7 (CALC)
CO2 SERPL-SCNC: 25 MMOL/L (ref 20–32)
CREAT SERPL-MCNC: 0.75 MG/DL (ref 0.5–1.05)
EGFRCR SERPLBLD CKD-EPI 2021: 88 ML/MIN/1.73M2
EST. AVERAGE GLUCOSE BLD GHB EST-MCNC: 120 MG/DL
EST. AVERAGE GLUCOSE BLD GHB EST-SCNC: 6.6 MMOL/L
GLUCOSE SERPL-MCNC: 97 MG/DL (ref 65–99)
HBA1C MFR BLD: 5.8 %
HDLC SERPL-MCNC: 52 MG/DL
LDLC SERPL CALC-MCNC: 64 MG/DL (CALC)
NONHDLC SERPL-MCNC: 89 MG/DL (CALC)
POTASSIUM SERPL-SCNC: 4.7 MMOL/L (ref 3.5–5.3)
PROT SERPL-MCNC: 7.2 G/DL (ref 6.1–8.1)
SODIUM SERPL-SCNC: 141 MMOL/L (ref 135–146)
TRIGL SERPL-MCNC: 177 MG/DL

## 2025-07-17 RX ORDER — CYANOCOBALAMIN 1000 UG/ML
1000 INJECTION, SOLUTION INTRAMUSCULAR; SUBCUTANEOUS ONCE
Status: COMPLETED | OUTPATIENT
Start: 2025-07-17 | End: 2025-07-16

## 2025-07-28 NOTE — PROGRESS NOTES
History of Present Illness  No chief complaint on file.      Patient presents with side: right hip pain for several years.  It has been worsening over the past  12 months.  The patient localizes the pain predominantly in the groin.  Recently there has been concern for falls and instability.  There is increasing difficulty with activities of daily living and significant disability related to the hip pain.  The patient endorses the following failed non-operative treatments: Rest, ice, elevation, Tylenol, and NSAIDS.   There is increasing frustration with persistent episodic pain.    Pain is described as aching, sore, and sharp.  Better with rest, worse with activity.   Pain level: 10  Assistive device: no device  History of surgery on the hip: no  Back pain: No  Numbness or tingling radiating to the lower extremities: No  She does note her pain radiates down her lateral leg to her knee    Medical History[1]    Medication Documentation Review Audit       Reviewed by Destiny Almendarez MD (Physician) on 07/16/25 at 1103      Medication Order Taking? Sig Documenting Provider Last Dose Status   amoxicillin (Amoxil) 500 mg capsule [id number]  1 capsule (500 mg).   Patient not taking: Reported on 1/14/2025    Historical Provider, MD  Active   anastrozole (Arimidex) 1 mg tablet [id number]  Take 1 tablet (1 mg total) by mouth once daily. Historical Provider, MD  Active   atorvastatin (Lipitor) 20 mg tablet [id number]  TAKE 1 TABLET BY MOUTH EVERY DAY Destiny Almendarez MD  Active   biotin 5 mg tablet [id number]  Take by mouth. Historical MD Brenda  Active   Ca comb no.1/vit D3/B6/FA/B12 (VITAMIN D3, CALCIUM CIT-PHOS, ORAL) [id number]  Take by mouth. Historical Provider, MD  Active   doxycycline (Vibra-Tabs) 50 mg tablet [id number] Yes Take 1 tablet (50 mg) by mouth once daily. Historical MD Brenda  Active     Discontinued 07/16/25 1038     Discontinued 07/14/25 0843   Jardiance 10 mg tablet [id number]  TAKE 1 TABLET BY  MOUTH EVERY DAY Destiny Almendarez MD  Active     Discontinued 25 0843   levothyroxine (Synthroid, Levoxyl) 25 mcg tablet [id number]  TAKE 1 TABLET BY MOUTH EVERY DAY AS DIRECTED Destiny Almendarez MD  Active   loratadine (Claritin) 10 mg tablet [id number]  Take 1 tablet (10 mg) by mouth once daily. Historical Provider, MD  Active     Discontinued 25 1102   meloxicam (Mobic) 15 mg tablet [id number]  Take 1 tablet (15 mg) by mouth once daily as needed for moderate pain (4 - 6). Destiny Almendarez MD  Active   metFORMIN (Glucophage) 500 mg tablet [id number]  TAKE 1 TABLET BY MOUTH EVERY DAY AT NIGHT Destiny Almendarez MD  Active   spironolactone (Aldactone) 25 mg tablet [id number]  TAKE 1 TABLET BY MOUTH EVERY DAY Destiny Almendarez MD  Active   tirzepatide (Mounjaro) 10 mg/0.5 mL pen injector [id number]  Inject 10 mg under the skin every 7 days. Destiny Almendarez MD  Active   valACYclovir (Valtrex) 500 mg tablet [id number]  Take 1 tablet (500 mg) by mouth 2 times a day. Historical Provider, MD  Active                    RX Allergies[2]    Social History     Socioeconomic History    Marital status:      Spouse name: Not on file    Number of children: Not on file    Years of education: Not on file    Highest education level: Not on file   Occupational History    Not on file   Tobacco Use    Smoking status: Former     Current packs/day: 0.00     Average packs/day: 1 pack/day for 8.0 years (8.0 ttl pk-yrs)     Types: Cigarettes     Start date: 1974     Quit date: 1982     Years since quittin.6    Smokeless tobacco: Never    Tobacco comments:     [id number]   Vaping Use    Vaping status: Never Used   Substance and Sexual Activity    Alcohol use: Not Currently    Drug use: Never    Sexual activity: Not Currently     Partners: Male     Birth control/protection: Post-menopausal   Other Topics Concern    Not on file   Social History Narrative    Not on file     Social Drivers of  Health     Financial Resource Strain: Low Risk  (8/8/2023)    Received from [facility]    Overall Financial Resource Strain (CARDIA)     Difficulty of Paying Living Expenses: Not hard at all   Food Insecurity: Not on file (8/8/2023)   Transportation Needs: Unknown (8/8/2023)    Received from [facility]    PRAPARE - Transportation     Lack of Transportation (Medical): Not on file     Lack of Transportation (Non-Medical): No   Physical Activity: Not on file   Stress: Not on file   Social Connections: Not on file   Intimate Partner Violence: Not on file   Housing Stability: Unknown (8/8/2023)    Received from [facility]    Housing Stability Vital Sign     Unable to Pay for Housing in the Last Year: Not on file     Number of Places Lived in the Last Year: Not on file     Unstable Housing in the Last Year: No       Surgical History[3]    No images are attached to the encounter.    BMI  32       Review of Systems   GENERAL: Negative for malaise, significant weight loss, fever  MUSCULOSKELETAL: see HPI  NEURO:  Negative     Exam  side: right Hip:  Antalgic gait  Negative Trendelenburg sign  Skin healthy and intact  No tenderness to palpation over lumbar spine  Minimal tenderness over greater trochanter     Range of motion:  Flexion: 120 internal rotation: 30 external rotation: 30 abduction: 30  Was unable to reproduce any pain with hip flexion or external rotation.  She had some slight groin pain with internal rotation.  No weakness with resisted hip flexion, abduction or adduction     Negative straight leg raise  Neurovascular exam normal distally     Radiographs  XR shoulder right 2+ views  Narrative: Interpreted By:  ERICA DAIGLE MD  MRN: [id number]  Patient Name: RUBY DOWNEY     STUDY:  SHOULDER, CMPLT, MIN 2 VIEWS;  7/7/2023 4:56 pm     INDICATION:  right shoulder pain.     COMPARISON:  09/18/2020     ACCESSION NUMBER(S):  [id number]      ORDERING CLINICIAN:  MARY BABCOCK     FINDINGS:  Right  shoulder three views     There is no fracture. There is no dislocation. There is osteophytosis  and sclerosis in the a single mild joints without joint space  narrowing     Impression: Mild degenerative changes. No acute abnormality  XR hip w pelvis  Narrative: Interpreted By:  ERICA DAIGLE MD  MRN: [id number]  Patient Name: RUBY DOWNEY     STUDY:  HIP, UNILATERAL W/PELVIS WHEN PERFORMED 2-3 VIEWS;  7/7/2023 4:55 pm     INDICATION:  pain in r hip.     COMPARISON:  None.     ACCESSION NUMBER(S):  [id number]     ORDERING CLINICIAN:  MARY BABCOCK     FINDINGS:  Right hip, 2 views     There is no fracture. There is no dislocation. Mild osteophytosis in  the right hip without joint space narrowing.     Impression: Mild right hip degenerative changes         Assessment  Osteoarthrosis side: right hip  Lumbar radiculopathy     Plan  We discussed with the patient the diagnosis of moderate degenerative joint disease of the hip.  We reviewed an evidence-based approach to osteoarthritis of the hip.  We strongly encouraged low-impact aerobic activity and non-opioid analgesics.  We discussed the role of physical therapy to aid in strengthening and gait training.  We discussed temporary pain relief with corticosteroid injections and the associated risks.      The patient elected for right hip ultra sounds guided intra articular corticosteroid injection for diagnostic and therapeutic purposes.  Follow up 6 weeks after injection  All questions answered                         [1]   Past Medical History:  Diagnosis Date    Arthritis 2016    Basal cell carcinoma 04/14/2023    Cataract     Diabetes mellitus (Multi)     History of abnormal mammogram 04/2023    right cat 4-had bx-DCIS    History of mammogram 07/02/2025    cat 2    Hypertension     Pap test, as part of routine gynecological examination 08/2023    wnl, hpv neg    Sleep apnea, obstructive August  2024    Syphilis     Tick bite    [2] No Known Allergies  [3]   Past Surgical History:  Procedure Laterality Date    BI STEREOTACTIC GUIDED BREAST RIGHT LOCALIZATION AND BIOPSY Right 4/13/2023    BI STEREOTACTIC GUIDED BREAST RIGHT LOCALIZATION AND BIOPSY 4/13/2023    BREAST BIOPSY  06/22/2023    COLONOSCOPY  10/2020    no polyps    ESOPHAGOGASTRODUODENOSCOPY  10/2020    JOINT REPLACEMENT  6/22/2019, 6/24/2022    ORTHODONTIC TREATMENT  2014    OTHER SURGICAL HISTORY  2018    Mohs surgery x 3    TOTAL KNEE ARTHROPLASTY Right 06/2019    WISDOM TOOTH EXTRACTION  1979

## 2025-07-29 ENCOUNTER — APPOINTMENT (OUTPATIENT)
Dept: ORTHOPEDIC SURGERY | Facility: CLINIC | Age: 65
End: 2025-07-29
Payer: MEDICARE

## 2025-07-29 ENCOUNTER — HOSPITAL ENCOUNTER (OUTPATIENT)
Dept: RADIOLOGY | Facility: HOSPITAL | Age: 65
Discharge: HOME | End: 2025-07-29
Payer: MEDICARE

## 2025-07-29 VITALS — HEIGHT: 66 IN | WEIGHT: 202 LBS | BODY MASS INDEX: 32.47 KG/M2

## 2025-07-29 DIAGNOSIS — M54.16 LUMBAR RADICULOPATHY: ICD-10-CM

## 2025-07-29 DIAGNOSIS — M25.551 HIP PAIN, RIGHT: ICD-10-CM

## 2025-07-29 DIAGNOSIS — M16.11 OSTEOARTHRITIS OF RIGHT HIP, UNSPECIFIED OSTEOARTHRITIS TYPE: Primary | ICD-10-CM

## 2025-07-29 PROCEDURE — 99203 OFFICE O/P NEW LOW 30 MIN: CPT | Performed by: ORTHOPAEDIC SURGERY

## 2025-07-29 PROCEDURE — 73502 X-RAY EXAM HIP UNI 2-3 VIEWS: CPT | Mod: RT

## 2025-07-29 PROCEDURE — 1159F MED LIST DOCD IN RCRD: CPT | Performed by: ORTHOPAEDIC SURGERY

## 2025-07-29 PROCEDURE — 3008F BODY MASS INDEX DOCD: CPT | Performed by: ORTHOPAEDIC SURGERY

## 2025-07-29 PROCEDURE — 73502 X-RAY EXAM HIP UNI 2-3 VIEWS: CPT | Mod: RIGHT SIDE | Performed by: RADIOLOGY

## 2025-08-08 ENCOUNTER — ANCILLARY PROCEDURE (OUTPATIENT)
Facility: HOSPITAL | Age: 65
End: 2025-08-08
Payer: MEDICARE

## 2025-08-08 DIAGNOSIS — Z00.00 PHYSICAL EXAM: ICD-10-CM

## 2025-08-08 PROCEDURE — 75571 CT HRT W/O DYE W/CA TEST: CPT

## 2025-08-11 DIAGNOSIS — R93.1 ELEVATED CORONARY ARTERY CALCIUM SCORE: ICD-10-CM

## 2025-08-13 ENCOUNTER — APPOINTMENT (OUTPATIENT)
Dept: ORTHOPEDIC SURGERY | Facility: CLINIC | Age: 65
End: 2025-08-13
Payer: MEDICARE

## 2025-08-13 ENCOUNTER — HOSPITAL ENCOUNTER (OUTPATIENT)
Dept: RADIOLOGY | Facility: EXTERNAL LOCATION | Age: 65
Discharge: HOME | End: 2025-08-13

## 2025-08-13 DIAGNOSIS — M16.11 OSTEOARTHRITIS OF RIGHT HIP, UNSPECIFIED OSTEOARTHRITIS TYPE: Primary | ICD-10-CM

## 2025-08-13 PROCEDURE — 20611 DRAIN/INJ JOINT/BURSA W/US: CPT | Performed by: STUDENT IN AN ORGANIZED HEALTH CARE EDUCATION/TRAINING PROGRAM

## 2025-08-13 PROCEDURE — 99213 OFFICE O/P EST LOW 20 MIN: CPT | Performed by: STUDENT IN AN ORGANIZED HEALTH CARE EDUCATION/TRAINING PROGRAM

## 2025-08-13 RX ORDER — BETAMETHASONE SODIUM PHOSPHATE AND BETAMETHASONE ACETATE 3; 3 MG/ML; MG/ML
18 INJECTION, SUSPENSION INTRA-ARTICULAR; INTRALESIONAL; INTRAMUSCULAR; SOFT TISSUE
Status: COMPLETED | OUTPATIENT
Start: 2025-08-13 | End: 2025-08-13

## 2025-08-13 RX ORDER — LIDOCAINE HYDROCHLORIDE 10 MG/ML
6 INJECTION, SOLUTION INFILTRATION; PERINEURAL
Status: COMPLETED | OUTPATIENT
Start: 2025-08-13 | End: 2025-08-13

## 2025-08-13 RX ADMIN — BETAMETHASONE SODIUM PHOSPHATE AND BETAMETHASONE ACETATE 18 MG: 3; 3 INJECTION, SUSPENSION INTRA-ARTICULAR; INTRALESIONAL; INTRAMUSCULAR; SOFT TISSUE at 09:12

## 2025-08-13 RX ADMIN — LIDOCAINE HYDROCHLORIDE 6 ML: 10 INJECTION, SOLUTION INFILTRATION; PERINEURAL at 09:12

## 2025-09-03 ENCOUNTER — APPOINTMENT (OUTPATIENT)
Dept: CARDIOLOGY | Facility: CLINIC | Age: 65
End: 2025-09-03
Payer: MEDICARE

## 2025-09-03 PROBLEM — R06.02 SHORTNESS OF BREATH: Status: ACTIVE | Noted: 2025-09-03

## 2025-09-03 PROBLEM — R93.1 AGATSTON CORONARY ARTERY CALCIUM SCORE GREATER THAN 400: Status: ACTIVE | Noted: 2025-09-03

## 2025-09-09 ENCOUNTER — APPOINTMENT (OUTPATIENT)
Dept: PHARMACY | Facility: HOSPITAL | Age: 65
End: 2025-09-09
Payer: COMMERCIAL

## 2025-09-30 ENCOUNTER — APPOINTMENT (OUTPATIENT)
Dept: ORTHOPEDIC SURGERY | Facility: CLINIC | Age: 65
End: 2025-09-30
Payer: MEDICARE

## 2025-10-09 ENCOUNTER — APPOINTMENT (OUTPATIENT)
Dept: CARDIOLOGY | Facility: CLINIC | Age: 65
End: 2025-10-09
Payer: MEDICARE

## 2025-12-30 ENCOUNTER — APPOINTMENT (OUTPATIENT)
Dept: NEUROLOGY | Facility: CLINIC | Age: 65
End: 2025-12-30
Payer: COMMERCIAL

## 2026-01-16 ENCOUNTER — APPOINTMENT (OUTPATIENT)
Dept: PRIMARY CARE | Facility: CLINIC | Age: 66
End: 2026-01-16
Payer: MEDICARE

## (undated) DEVICE — TUBING, SUCTION, 9/32" X 12', STRAIGHT: Brand: MEDLINE INDUSTRIES, INC.

## (undated) DEVICE — APPLICATOR  COTTON-TIPPED 6 IN WOOD STRL

## (undated) DEVICE — PLASMABLADE X PS210-030S-LIGHT 3.0SL: Brand: PLASMABLADE™ X

## (undated) DEVICE — NEEDLE HYPO 25GA L1.5IN BLU POLYPR HUB S STL REG BVL STR

## (undated) DEVICE — GOWN,AURORA,NONRNF,XL,30/CS: Brand: MEDLINE

## (undated) DEVICE — COUNTER NDL 40 COUNT HLD 70 FOAM BLK ADH W/ MAG

## (undated) DEVICE — PROVE COVER: Brand: UNBRANDED

## (undated) DEVICE — 4-PORT MANIFOLD: Brand: NEPTUNE 2

## (undated) DEVICE — APPLICATOR MEDICATED 26 CC SOLUTION HI LT ORNG CHLORAPREP

## (undated) DEVICE — RADIOGRAPHY DEVICE SPECIMEN TRANSPEC

## (undated) DEVICE — CLIP LIG M BLU TI HRT SHP WIRE HORZ 600 PER BX

## (undated) DEVICE — GEL ULTRASOUND 20 GM STRL

## (undated) DEVICE — GOWN,AURORA,NONREINFORCED,LARGE: Brand: MEDLINE

## (undated) DEVICE — GLOVE ORANGE PI 7   MSG9070

## (undated) DEVICE — TOWEL,OR,DSP,ST,BLUE,STD,4/PK,20PK/CS: Brand: MEDLINE

## (undated) DEVICE — YANKAUER,BULB TIP,W/O VENT,RIGID,STERILE: Brand: MEDLINE

## (undated) DEVICE — LABEL MED MINI W/ MARKER

## (undated) DEVICE — GAUZE,SPONGE,FLUFF,6"X6.75",STRL,10/TRAY: Brand: MEDLINE

## (undated) DEVICE — MARKER SURG SKIN GENTIAN VLT REG TIP W/ 6IN RUL

## (undated) DEVICE — COVER LT HNDL BLU PLAS

## (undated) DEVICE — SYRINGE MED 10ML LUERLOCK TIP W/O SFTY DISP

## (undated) DEVICE — LIQUIBAND RAPID ADHESIVE 36/CS 0.8ML: Brand: MEDLINE

## (undated) DEVICE — APPLICATOR MEDICATED 10.5 CC SOLUTION HI LT ORNG CHLORAPREP

## (undated) DEVICE — HYPODERMIC SAFETY NEEDLE: Brand: MAGELLAN

## (undated) DEVICE — SYRINGE IRRIG 60ML SFT PLIABLE BLB EZ TO GRP 1 HND USE W/

## (undated) DEVICE — BRA SURGICAL 3-ROW HOOK AND EYE XXLARGE

## (undated) DEVICE — SUTURE VCRL SZ 3-0 L27IN ABSRB UD L26MM SH 1/2 CIR J416H

## (undated) DEVICE — PACK,LAPAROTOMY,NO GOWNS: Brand: MEDLINE

## (undated) DEVICE — SPONGE,LAP,18"X18",DLX,XR,ST,5/PK,40/PK: Brand: MEDLINE

## (undated) DEVICE — SHEET,DRAPE,53X77,STERILE: Brand: MEDLINE

## (undated) DEVICE — ELECTRODE PT RET AD L9FT HI MOIST COND ADH HYDRGEL CORDED